# Patient Record
Sex: MALE | Race: WHITE | NOT HISPANIC OR LATINO | Employment: OTHER | URBAN - METROPOLITAN AREA
[De-identification: names, ages, dates, MRNs, and addresses within clinical notes are randomized per-mention and may not be internally consistent; named-entity substitution may affect disease eponyms.]

---

## 2023-07-11 LAB
ALANINE AMINOTRANSFERASE (SGPT) (U/L) IN SER/PLAS: 20 U/L (ref 10–52)
ALBUMIN (G/DL) IN SER/PLAS: 4.3 G/DL (ref 3.4–5)
ALKALINE PHOSPHATASE (U/L) IN SER/PLAS: 65 U/L (ref 33–136)
ANION GAP IN SER/PLAS: 9 MMOL/L (ref 10–20)
ASPARTATE AMINOTRANSFERASE (SGOT) (U/L) IN SER/PLAS: 19 U/L (ref 9–39)
BILIRUBIN TOTAL (MG/DL) IN SER/PLAS: 0.6 MG/DL (ref 0–1.2)
CALCIUM (MG/DL) IN SER/PLAS: 9.8 MG/DL (ref 8.6–10.3)
CARBON DIOXIDE, TOTAL (MMOL/L) IN SER/PLAS: 32 MMOL/L (ref 21–32)
CHLORIDE (MMOL/L) IN SER/PLAS: 105 MMOL/L (ref 98–107)
CHOLESTEROL (MG/DL) IN SER/PLAS: 177 MG/DL (ref 0–199)
CHOLESTEROL IN HDL (MG/DL) IN SER/PLAS: 37.8 MG/DL
CHOLESTEROL/HDL RATIO: 4.7
CREATININE (MG/DL) IN SER/PLAS: 0.93 MG/DL (ref 0.5–1.3)
ERYTHROCYTE DISTRIBUTION WIDTH (RATIO) BY AUTOMATED COUNT: 12 % (ref 11.5–14.5)
ERYTHROCYTE MEAN CORPUSCULAR HEMOGLOBIN CONCENTRATION (G/DL) BY AUTOMATED: 33.6 G/DL (ref 32–36)
ERYTHROCYTE MEAN CORPUSCULAR VOLUME (FL) BY AUTOMATED COUNT: 95 FL (ref 80–100)
ERYTHROCYTES (10*6/UL) IN BLOOD BY AUTOMATED COUNT: 4.83 X10E12/L (ref 4.5–5.9)
GFR MALE: 84 ML/MIN/1.73M2
GLUCOSE (MG/DL) IN SER/PLAS: 105 MG/DL (ref 74–99)
HEMATOCRIT (%) IN BLOOD BY AUTOMATED COUNT: 45.8 % (ref 41–52)
HEMOGLOBIN (G/DL) IN BLOOD: 15.4 G/DL (ref 13.5–17.5)
LDL: 101 MG/DL (ref 0–99)
LEUKOCYTES (10*3/UL) IN BLOOD BY AUTOMATED COUNT: 4.3 X10E9/L (ref 4.4–11.3)
PLATELETS (10*3/UL) IN BLOOD AUTOMATED COUNT: 171 X10E9/L (ref 150–450)
POTASSIUM (MMOL/L) IN SER/PLAS: 3.9 MMOL/L (ref 3.5–5.3)
PROSTATE SPECIFIC AG (NG/ML) IN SER/PLAS: 1.81 NG/ML (ref 0–4)
PROTEIN TOTAL: 7 G/DL (ref 6.4–8.2)
SODIUM (MMOL/L) IN SER/PLAS: 142 MMOL/L (ref 136–145)
THYROTROPIN (MIU/L) IN SER/PLAS BY DETECTION LIMIT <= 0.05 MIU/L: 4.47 MIU/L (ref 0.44–3.98)
THYROXINE (T4) FREE (NG/DL) IN SER/PLAS: 0.74 NG/DL (ref 0.61–1.12)
TRIGLYCERIDE (MG/DL) IN SER/PLAS: 190 MG/DL (ref 0–149)
UREA NITROGEN (MG/DL) IN SER/PLAS: 16 MG/DL (ref 6–23)
VLDL: 38 MG/DL (ref 0–40)

## 2023-09-27 PROBLEM — N40.1 ENLARGED PROSTATE WITH LOWER URINARY TRACT SYMPTOMS (LUTS): Status: ACTIVE | Noted: 2023-09-27

## 2023-09-27 PROBLEM — H35.3290 EXUDATIVE AGE-RELATED MACULAR DEGENERATION (MULTI): Status: ACTIVE | Noted: 2023-09-27

## 2023-09-27 PROBLEM — K57.30 DIVERTICULOSIS OF COLON: Status: ACTIVE | Noted: 2023-09-27

## 2023-09-27 PROBLEM — J30.9 ALLERGIC RHINITIS: Status: ACTIVE | Noted: 2023-09-27

## 2023-09-27 PROBLEM — E55.9 VITAMIN D DEFICIENCY: Status: ACTIVE | Noted: 2023-09-27

## 2023-09-27 PROBLEM — E78.5 HLD (HYPERLIPIDEMIA): Status: ACTIVE | Noted: 2023-09-27

## 2023-09-27 PROBLEM — Z85.89 HISTORY OF SQUAMOUS CELL CARCINOMA: Status: ACTIVE | Noted: 2023-09-27

## 2023-09-27 PROBLEM — D22.9 ATYPICAL NEVUS: Status: ACTIVE | Noted: 2023-09-27

## 2023-09-27 PROBLEM — H25.13 CATARACT, NUCLEAR SCLEROTIC, BOTH EYES: Status: ACTIVE | Noted: 2023-09-27

## 2023-09-27 PROBLEM — H00.019 HORDEOLUM EXTERNUM (STYE): Status: ACTIVE | Noted: 2023-09-27

## 2023-09-27 PROBLEM — L91.8 INFLAMED SKIN TAG: Status: ACTIVE | Noted: 2023-09-27

## 2023-09-27 PROBLEM — M19.90 OSTEOARTHROSIS: Status: ACTIVE | Noted: 2023-09-27

## 2023-09-27 PROBLEM — H35.30 MACULAR DEGENERATION: Status: ACTIVE | Noted: 2023-09-27

## 2023-09-27 PROBLEM — H69.90 EUSTACHIAN TUBE DYSFUNCTION: Status: ACTIVE | Noted: 2023-09-27

## 2023-09-27 PROBLEM — E66.3 OVERWEIGHT (BMI 25.0-29.9): Status: ACTIVE | Noted: 2023-09-27

## 2023-09-27 PROBLEM — H44.23 MYOPIC DEGENERATION, BILATERAL: Status: ACTIVE | Noted: 2023-09-27

## 2023-09-27 PROBLEM — K21.9 GERD (GASTROESOPHAGEAL REFLUX DISEASE): Status: ACTIVE | Noted: 2023-09-27

## 2023-09-27 PROBLEM — H00.026 INTERNAL HORDEOLUM OF LEFT EYE: Status: ACTIVE | Noted: 2023-09-27

## 2023-09-27 PROBLEM — I10 BENIGN HYPERTENSION: Status: ACTIVE | Noted: 2023-09-27

## 2023-09-27 PROBLEM — E80.6 HYPERBILIRUBINEMIA: Status: ACTIVE | Noted: 2023-09-27

## 2023-09-27 RX ORDER — CHOLECALCIFEROL (VITAMIN D3) 50 MCG
1 TABLET ORAL DAILY
COMMUNITY

## 2023-09-27 RX ORDER — PANTOPRAZOLE SODIUM 40 MG/1
1 TABLET, DELAYED RELEASE ORAL DAILY
COMMUNITY
Start: 2022-06-20 | End: 2023-10-03 | Stop reason: ALTCHOICE

## 2023-09-27 RX ORDER — SIMVASTATIN 20 MG/1
1 TABLET, FILM COATED ORAL NIGHTLY
COMMUNITY
Start: 2021-06-03 | End: 2023-10-03

## 2023-09-27 RX ORDER — AMLODIPINE BESYLATE 2.5 MG/1
1 TABLET ORAL DAILY
COMMUNITY
Start: 2021-06-03 | End: 2024-01-08 | Stop reason: SDUPTHER

## 2023-09-27 RX ORDER — BENAZEPRIL HYDROCHLORIDE AND HYDROCHLOROTHIAZIDE 20; 25 MG/1; MG/1
1 TABLET ORAL DAILY
COMMUNITY
Start: 2022-07-23 | End: 2023-12-19

## 2023-10-02 ENCOUNTER — OFFICE VISIT (OUTPATIENT)
Dept: OPHTHALMOLOGY | Facility: CLINIC | Age: 78
End: 2023-10-02
Payer: MEDICARE

## 2023-10-02 DIAGNOSIS — H35.3231 EXUDATIVE AGE-RELATED MACULAR DEGENERATION OF BOTH EYES WITH ACTIVE CHOROIDAL NEOVASCULARIZATION (MULTI): ICD-10-CM

## 2023-10-02 DIAGNOSIS — H44.23 DEGENERATIVE MYOPIA OF BOTH EYES, UNSPECIFIED WHETHER COMPLICATION PRESENT: Primary | ICD-10-CM

## 2023-10-02 PROCEDURE — 1159F MED LIST DOCD IN RCRD: CPT | Performed by: OPHTHALMOLOGY

## 2023-10-02 PROCEDURE — 1126F AMNT PAIN NOTED NONE PRSNT: CPT | Performed by: OPHTHALMOLOGY

## 2023-10-02 PROCEDURE — 3079F DIAST BP 80-89 MM HG: CPT | Performed by: OPHTHALMOLOGY

## 2023-10-02 PROCEDURE — 3075F SYST BP GE 130 - 139MM HG: CPT | Performed by: OPHTHALMOLOGY

## 2023-10-02 PROCEDURE — 1036F TOBACCO NON-USER: CPT | Performed by: OPHTHALMOLOGY

## 2023-10-02 PROCEDURE — 92134 CPTRZ OPH DX IMG PST SGM RTA: CPT | Mod: BILATERAL PROCEDURE | Performed by: OPHTHALMOLOGY

## 2023-10-02 PROCEDURE — 67028 INJECTION EYE DRUG: CPT | Mod: BILATERAL PROCEDURE | Performed by: OPHTHALMOLOGY

## 2023-10-02 ASSESSMENT — VISUAL ACUITY
OS_CC: 20/50-2
OD_CC: 20/25-3
METHOD: SNELLEN - LINEAR

## 2023-10-02 ASSESSMENT — TONOMETRY
OD_IOP_MMHG: 20
OS_IOP_MMHG: 18
IOP_METHOD: TONOPEN

## 2023-10-02 ASSESSMENT — ENCOUNTER SYMPTOMS: EYES NEGATIVE: 1

## 2023-10-02 NOTE — PROGRESS NOTES
GENERAL/CONSTITUTIONAL blurry [place]  INTEGUMENTARY  ENT  RESPIRATORY   [facility]  GI    MUSCULOSKELETAL  NEUROLOGICAL  ENDOCRINE  HEMATOLOGIC/LYMPHATIC  PSYCHIATRIC  ALLERGIES/IMMUNOLOGICAL        Sylvester Carvalho  77 y.o.     IMAGING.  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 Recommend anti-VEGF injections. Eylea done OU today in a sterile manner with Betadine 5% for antisepsis.    Return 6w

## 2023-10-03 DIAGNOSIS — E78.2 MODERATE MIXED HYPERLIPIDEMIA NOT REQUIRING STATIN THERAPY: Primary | ICD-10-CM

## 2023-10-03 RX ORDER — SIMVASTATIN 20 MG/1
20 TABLET, FILM COATED ORAL NIGHTLY
Qty: 90 TABLET | Refills: 0 | Status: SHIPPED | OUTPATIENT
Start: 2023-10-03 | End: 2023-12-06

## 2023-10-04 ENCOUNTER — TELEPHONE (OUTPATIENT)
Dept: PRIMARY CARE | Facility: CLINIC | Age: 78
End: 2023-10-04
Payer: MEDICARE

## 2023-10-10 ENCOUNTER — OFFICE VISIT (OUTPATIENT)
Dept: PRIMARY CARE | Facility: CLINIC | Age: 78
End: 2023-10-10
Payer: MEDICARE

## 2023-10-10 ENCOUNTER — ANCILLARY PROCEDURE (OUTPATIENT)
Dept: RADIOLOGY | Facility: CLINIC | Age: 78
End: 2023-10-10
Payer: MEDICARE

## 2023-10-10 ENCOUNTER — TELEPHONE (OUTPATIENT)
Dept: PRIMARY CARE | Facility: CLINIC | Age: 78
End: 2023-10-10

## 2023-10-10 VITALS
WEIGHT: 178 LBS | TEMPERATURE: 97.6 F | DIASTOLIC BLOOD PRESSURE: 90 MMHG | HEART RATE: 83 BPM | HEIGHT: 66 IN | SYSTOLIC BLOOD PRESSURE: 132 MMHG | OXYGEN SATURATION: 95 % | BODY MASS INDEX: 28.61 KG/M2

## 2023-10-10 DIAGNOSIS — M85.641 CYST OF BONE OF RIGHT HAND: ICD-10-CM

## 2023-10-10 DIAGNOSIS — Z12.11 ENCOUNTER FOR SCREENING FOR MALIGNANT NEOPLASM OF COLON: ICD-10-CM

## 2023-10-10 DIAGNOSIS — E66.3 OVERWEIGHT (BMI 25.0-29.9): ICD-10-CM

## 2023-10-10 DIAGNOSIS — I10 BENIGN HYPERTENSION: ICD-10-CM

## 2023-10-10 DIAGNOSIS — R19.5 PENCILLING OF STOOLS: Primary | ICD-10-CM

## 2023-10-10 DIAGNOSIS — R19.5 PENCILLING OF STOOLS: ICD-10-CM

## 2023-10-10 PROCEDURE — 1159F MED LIST DOCD IN RCRD: CPT | Performed by: FAMILY MEDICINE

## 2023-10-10 PROCEDURE — 74176 CT ABD & PELVIS W/O CONTRAST: CPT | Performed by: STUDENT IN AN ORGANIZED HEALTH CARE EDUCATION/TRAINING PROGRAM

## 2023-10-10 PROCEDURE — 74176 CT ABD & PELVIS W/O CONTRAST: CPT | Mod: MG

## 2023-10-10 PROCEDURE — 1126F AMNT PAIN NOTED NONE PRSNT: CPT | Performed by: FAMILY MEDICINE

## 2023-10-10 PROCEDURE — 99214 OFFICE O/P EST MOD 30 MIN: CPT | Performed by: FAMILY MEDICINE

## 2023-10-10 PROCEDURE — 3080F DIAST BP >= 90 MM HG: CPT | Performed by: FAMILY MEDICINE

## 2023-10-10 PROCEDURE — 3075F SYST BP GE 130 - 139MM HG: CPT | Performed by: FAMILY MEDICINE

## 2023-10-10 RX ORDER — PANTOPRAZOLE SODIUM 40 MG/1
40 TABLET, DELAYED RELEASE ORAL
COMMUNITY
End: 2024-01-08 | Stop reason: SDUPTHER

## 2023-10-10 ASSESSMENT — PAIN SCALES - GENERAL: PAINLEVEL: 0-NO PAIN

## 2023-10-10 ASSESSMENT — PATIENT HEALTH QUESTIONNAIRE - PHQ9
1. LITTLE INTEREST OR PLEASURE IN DOING THINGS: NOT AT ALL
SUM OF ALL RESPONSES TO PHQ9 QUESTIONS 1 AND 2: 0
2. FEELING DOWN, DEPRESSED OR HOPELESS: NOT AT ALL

## 2023-10-10 NOTE — TELEPHONE ENCOUNTER
Pt was just seen today and said he saw the CT results, he wanted to clarify that he can go ahead and schedule the colonoscopy now correct, or if there is anything else he needs to do

## 2023-10-10 NOTE — PROGRESS NOTES
Saturday  - very thin fecal matter   Sunday - Took laxatives, cleaned out   Today - pencil thin fecal matter again   No change in anything else     Pt unsure if he should be taking the pantoprazole 40 or 20     Right hand pinky finger bump

## 2023-10-10 NOTE — PROGRESS NOTES
Patient is here concerned with pencil thin stool.  He noticed this on Saturday if he has a regular bowel movement after he eats breakfast every morning.  He thought this was usual and had had [employer] fish and chips and felt a little constipated.  His wife just had a colonoscopy and so he took 2 of her Dulcolax and got really cleaned out Sunday and into Monday.  His last colonoscopy was 7 years ago.  He then had another pencil thin stool without pain, bleeding, nausea vomiting or diarrhea.  He was concerned about this.  He also noticed he had a little cyst come up on the distal fifth digit of the right hand dorsally.    REVIEW OF SYSTEMS: 12 systems negative except for those mentioned in the HPI.    PHYSICAL EXAMINATION:   Constitutional: The patient is alert, in no acute  distress.  Eyes: Extraocular movements are intact.   ENT: external ear canals patent  Neck: no  JVD.  Heart: no JVD  Lungs: Normal respiration without stridor or nasal flaring   Psychiatric: Good judgment and insight. Normal affect and mood.  Skin: There is a fluctuant noninfected cyst of the DIP joint of the fifth digit of the right hand      Assessment:  per EMR    Plan:  Definitely concerned with the patient's symptoms for an obstructive mass in the abdomen.  Is been 7 years since he had a colonoscopy we will schedule him for this full dose that abdomen pelvis with contrast to rule out obstructing mass especially since the patient had clean himself out to rule out regular constipation and still had pencil stool after this.  We can address the possible ganglion cyst with hand surgery after we have this and possible colorectal referral  This dictation was created using Dragon dictation and may contain errors

## 2023-10-12 NOTE — PROGRESS NOTES
Sylvester Carvalho is a 77 y.o. male with past medical history of HTN, hyperlipidemia, and GERD who is referred by Dr. Ashley for a pre-colonoscopy screening exam. There is no known family history of colon polyps or colorectal cancer. His first and only colonoscopy was in 2016 with diverticulosis in sigmoid and descending colon, otherwise normal. He states he has always been regular with one formed bowel movement in the morning after breakfast. About 2 weeks ago he developed pencil thin stools. This occurred for several days. He tried taking 2 Dulcolax which gave him somewhat of a clean out, but the next day stools went back to being pencil thin. There was no rectal bleeding, abdominal pain, or weight loss. He called his PCP who ordered CT scan. No bowel wall thickening, dilation or obstructing mass. Normal appendix. Colonic diverticulosis without acute inflammation. Today, he tells me stools have now returned to normal, going once a day in AM. No longer appear thin.     Past Medical History:   Diagnosis Date    Exudative age-related macular degeneration, left eye, stage unspecified (CMS/[facility]) 01/31/2018    Age-related macular degeneration, wet, left eye    Personal history of other endocrine, nutritional and metabolic disease 01/24/2018    History of high cholesterol     Past Surgical History:   Procedure Laterality Date    TOTAL HIP ARTHROPLASTY  01/24/2018    Hip Replacement     Current Outpatient Medications   Medication Sig Dispense Refill    amLODIPine (Norvasc) 2.5 mg tablet Take 1 tablet (2.5 mg) by mouth once daily.      benazepriL-hydrochlorothiazide (Lotensin HCT) 20-25 mg tablet Take 1 tablet by mouth once daily.      cholecalciferol (Vitamin D-3) 50 MCG (2000 UT) tablet Take 1 tablet (2,000 Units) by mouth once daily.      pantoprazole (ProtoNix) 40 mg EC tablet Take 1 tablet (40 mg) by mouth once daily in the morning. Take before meals. Do not crush, chew, or split.      simvastatin (Zocor) 20 mg tablet TAKE  "1 TABLET BY MOUTH AT  BEDTIME 90 tablet 0     No current facility-administered medications for this visit.     Allergies   Allergen Reactions    Penicillins Unknown       Family History   Problem Relation Name Age of Onset    Other (stroke syndrome) Mother      Lung cancer Father      No Known Problems Other         Review of Systems  Review of Systems negative except as noted in HPI.    Objective     /81   Pulse 80   Temp 36.6 °C (97.9 °F)   Ht 1.676 m (5' 6\")   Wt 81.2 kg (179 lb)   BMI 28.89 kg/m²      Physical Exam  Constitutional:  No acute distress. Normal appearance. Not ill-appearing.  HENT:  Head normocephalic and atraumatic. Conjunctivae normal.  Cardiovascular:  Normal rate. Regular rhythm.  Pulmonary:  Pulmonary effort normal. No respiratory distress. Breath sounds clear.  Abdominal:  Abdomen is soft. There is no distension. No tenderness or guarding.  Skin: Dry.  Neurological:  Alert and oriented.  Psychiatric:  Mood and affect normal.    Assessment/Plan     Sylvester Carvalho is a 77 y.o. male with history of HTN, hyperlipidemia, and GERD who presents today for 1-2 week history of change in bowel habits to include pencil-thin stools. He had a CT scan without bowel wall thickening or obstructive mass. He is average risk for colon cancer. His first and only colonoscopy was 2016 with diverticulosis otherwise normal. He has no significant family history.     We discussed Cologuard versus colonoscopy including risks and benefits of procedure. He wishes to proceed with colonoscopy.      The patient is to return prn for evaluation.     Electronically signed by: Keren Terrell CNP on 10/20/2023 at 10:06 AM   "

## 2023-10-13 ENCOUNTER — APPOINTMENT (OUTPATIENT)
Dept: GASTROENTEROLOGY | Facility: CLINIC | Age: 78
End: 2023-10-13
Payer: MEDICARE

## 2023-10-20 ENCOUNTER — OFFICE VISIT (OUTPATIENT)
Dept: GASTROENTEROLOGY | Facility: CLINIC | Age: 78
End: 2023-10-20
Payer: MEDICARE

## 2023-10-20 VITALS
DIASTOLIC BLOOD PRESSURE: 81 MMHG | WEIGHT: 179 LBS | HEIGHT: 66 IN | HEART RATE: 80 BPM | SYSTOLIC BLOOD PRESSURE: 137 MMHG | BODY MASS INDEX: 28.77 KG/M2 | TEMPERATURE: 97.9 F

## 2023-10-20 DIAGNOSIS — R19.4 CHANGE IN BOWEL HABITS: ICD-10-CM

## 2023-10-20 DIAGNOSIS — R19.5 PENCILLING OF STOOLS: Primary | ICD-10-CM

## 2023-10-20 PROCEDURE — 1126F AMNT PAIN NOTED NONE PRSNT: CPT | Performed by: NURSE PRACTITIONER

## 2023-10-20 PROCEDURE — 99204 OFFICE O/P NEW MOD 45 MIN: CPT | Performed by: NURSE PRACTITIONER

## 2023-10-20 PROCEDURE — 99214 OFFICE O/P EST MOD 30 MIN: CPT | Mod: PO | Performed by: NURSE PRACTITIONER

## 2023-10-20 PROCEDURE — 3079F DIAST BP 80-89 MM HG: CPT | Performed by: NURSE PRACTITIONER

## 2023-10-20 PROCEDURE — 1159F MED LIST DOCD IN RCRD: CPT | Performed by: NURSE PRACTITIONER

## 2023-10-20 PROCEDURE — 1036F TOBACCO NON-USER: CPT | Performed by: NURSE PRACTITIONER

## 2023-10-20 PROCEDURE — 3075F SYST BP GE 130 - 139MM HG: CPT | Performed by: NURSE PRACTITIONER

## 2023-10-20 ASSESSMENT — ENCOUNTER SYMPTOMS: DEPRESSION: 0

## 2023-10-25 ENCOUNTER — ANESTHESIA EVENT (OUTPATIENT)
Dept: GASTROENTEROLOGY | Facility: EXTERNAL LOCATION | Age: 78
End: 2023-10-25

## 2023-11-01 ENCOUNTER — PREP FOR PROCEDURE (OUTPATIENT)
Dept: GASTROENTEROLOGY | Facility: EXTERNAL LOCATION | Age: 78
End: 2023-11-01
Payer: MEDICARE

## 2023-11-03 ENCOUNTER — HOSPITAL ENCOUNTER (OUTPATIENT)
Dept: GASTROENTEROLOGY | Facility: EXTERNAL LOCATION | Age: 78
Discharge: HOME | End: 2023-11-03
Payer: MEDICARE

## 2023-11-03 ENCOUNTER — ANESTHESIA (OUTPATIENT)
Dept: GASTROENTEROLOGY | Facility: EXTERNAL LOCATION | Age: 78
End: 2023-11-03

## 2023-11-03 VITALS
WEIGHT: 170 LBS | HEART RATE: 74 BPM | OXYGEN SATURATION: 98 % | TEMPERATURE: 96.8 F | SYSTOLIC BLOOD PRESSURE: 123 MMHG | HEIGHT: 66 IN | RESPIRATION RATE: 16 BRPM | BODY MASS INDEX: 27.32 KG/M2 | DIASTOLIC BLOOD PRESSURE: 80 MMHG

## 2023-11-03 DIAGNOSIS — Z12.11 ENCOUNTER FOR SCREENING FOR MALIGNANT NEOPLASM OF COLON: ICD-10-CM

## 2023-11-03 PROCEDURE — 88305 TISSUE EXAM BY PATHOLOGIST: CPT | Mod: TC | Performed by: INTERNAL MEDICINE

## 2023-11-03 PROCEDURE — 45380 COLONOSCOPY AND BIOPSY: CPT | Performed by: INTERNAL MEDICINE

## 2023-11-03 PROCEDURE — 88305 TISSUE EXAM BY PATHOLOGIST: CPT | Performed by: PATHOLOGY

## 2023-11-03 PROCEDURE — 88305 TISSUE EXAM BY PATHOLOGIST: CPT | Mod: TC,SUR | Performed by: INTERNAL MEDICINE

## 2023-11-03 PROCEDURE — 45385 COLONOSCOPY W/LESION REMOVAL: CPT | Performed by: INTERNAL MEDICINE

## 2023-11-03 RX ORDER — ONDANSETRON HYDROCHLORIDE 2 MG/ML
4 INJECTION, SOLUTION INTRAVENOUS ONCE AS NEEDED
Status: DISCONTINUED | OUTPATIENT
Start: 2023-11-03 | End: 2023-11-04 | Stop reason: HOSPADM

## 2023-11-03 RX ORDER — PROPOFOL 10 MG/ML
INJECTION, EMULSION INTRAVENOUS AS NEEDED
Status: DISCONTINUED | OUTPATIENT
Start: 2023-11-03 | End: 2023-11-03

## 2023-11-03 RX ORDER — SODIUM CHLORIDE 9 MG/ML
20 INJECTION, SOLUTION INTRAVENOUS CONTINUOUS
Status: DISCONTINUED | OUTPATIENT
Start: 2023-11-03 | End: 2023-11-04 | Stop reason: HOSPADM

## 2023-11-03 RX ADMIN — PROPOFOL 50 MG: 10 INJECTION, EMULSION INTRAVENOUS at 12:44

## 2023-11-03 RX ADMIN — PROPOFOL 50 MG: 10 INJECTION, EMULSION INTRAVENOUS at 12:54

## 2023-11-03 RX ADMIN — PROPOFOL 50 MG: 10 INJECTION, EMULSION INTRAVENOUS at 12:52

## 2023-11-03 RX ADMIN — PROPOFOL 50 MG: 10 INJECTION, EMULSION INTRAVENOUS at 12:49

## 2023-11-03 RX ADMIN — SODIUM CHLORIDE: 9 INJECTION, SOLUTION INTRAVENOUS at 12:37

## 2023-11-03 RX ADMIN — PROPOFOL 100 MG: 10 INJECTION, EMULSION INTRAVENOUS at 12:40

## 2023-11-03 SDOH — HEALTH STABILITY: MENTAL HEALTH: CURRENT SMOKER: 0

## 2023-11-03 ASSESSMENT — COLUMBIA-SUICIDE SEVERITY RATING SCALE - C-SSRS
1. IN THE PAST MONTH, HAVE YOU WISHED YOU WERE DEAD OR WISHED YOU COULD GO TO SLEEP AND NOT WAKE UP?: NO
2. HAVE YOU ACTUALLY HAD ANY THOUGHTS OF KILLING YOURSELF?: NO
6. HAVE YOU EVER DONE ANYTHING, STARTED TO DO ANYTHING, OR PREPARED TO DO ANYTHING TO END YOUR LIFE?: NO

## 2023-11-03 ASSESSMENT — PAIN - FUNCTIONAL ASSESSMENT
PAIN_FUNCTIONAL_ASSESSMENT: 0-10

## 2023-11-03 ASSESSMENT — PAIN SCALES - GENERAL
PAIN_LEVEL: 0
PAINLEVEL_OUTOF10: 0 - NO PAIN

## 2023-11-03 NOTE — H&P
Outpatient Hospital Procedure H&P    Patient Profile-Procedures  Initial Info  Patient Demographics  Name Sylvester Carvalho  Date of Birth 1945  MRN [id number]  Address   [address] [address]  [place] [id number] [address]  [place] [address]    Primary Phone Number [phone]  Secondary Phone Number    PCP Cedric Ashley    Procedure(s):  Colonoscopy    Primary contact name and number   Extended Emergency Contact Information  Primary Emergency Contact: Jenniffer Carvalho   [facility]  Home Phone: [phone]  Mobile Phone: [phone]  Relation: Spouse    General Health  Weight   Vitals:    11/03/23 1157   Weight: 77.1 kg (170 lb)     BMI Body mass index is 27.44 kg/m².    Allergies  Allergies   Allergen Reactions    Penicillins Unknown       Past Medical History   Past Medical History:   Diagnosis Date    Exudative age-related macular degeneration, left eye, stage unspecified (CMS/[facility]) 01/31/2018    Age-related macular degeneration, wet, left eye    GERD (gastroesophageal reflux disease)     Hyperlipidemia     Hypertension     Personal history of other endocrine, nutritional and metabolic disease 01/24/2018    History of high cholesterol       Provider assessment  Diagnosis: Change in stool caliber      Medication Reviewed - yes  Prior to Admission medications    Medication Sig Start Date End Date Taking? Authorizing Provider   amLODIPine (Norvasc) 2.5 mg tablet Take 1 tablet (2.5 mg) by mouth once daily. 6/3/21  Yes Historical Provider, MD   benazepriL-hydrochlorothiazide (Lotensin HCT) 20-25 mg tablet Take 1 tablet by mouth once daily. 7/23/22  Yes Historical Provider, MD   cholecalciferol (Vitamin D-3) 50 MCG (2000 UT) tablet Take 1 tablet (2,000 Units) by mouth once daily.   Yes Historical Provider, MD   pantoprazole (ProtoNix) 40 mg EC tablet Take 1 tablet (40 mg) by mouth once daily in the morning. Take before meals. Do not crush, chew, or split.   Yes Historical Provider, MD    simvastatin (Zocor) 20 mg tablet TAKE 1 TABLET BY MOUTH AT  BEDTIME 10/3/23  Yes Cedric YUNG Ashley, DO       Physical Exam  Vitals:    11/03/23 1157   BP: 113/71   Pulse: 76   Resp: 12   Temp: 36 °C (96.8 °F)   SpO2: 96%        General: A&Ox3, NAD.  HEENT: AT/NC.   CV: RRR. No murmur.  Resp: CTA bilaterally. No wheezing, rhonchi or rales.   GI: Soft, NT/ND. BSx4.  Extrem: No edema. Pulses intact.  Skin: No Jaundice.   Neuro: No focal deficits.   Psych: Normal mood and affect.        Oropharyngeal Classification II (hard and soft palate, upper portion of tonsils anduvula visible)  ASA PS Classification 2  Sedation Plan Deep  Procedure Plan - pre-procedural (re)assesment completed by physician:  discharge/transfer patient when discharge criteria met    Darian Booth MD  11/3/2023 12:21 PM

## 2023-11-03 NOTE — ANESTHESIA POSTPROCEDURE EVALUATION
Patient: Sylvester Carvalho    Procedure Summary       Date: 11/03/23 Room / Location: [facility]    Anesthesia Start: 1237 Anesthesia Stop: 1311    Procedure: COLONOSCOPY Diagnosis: Encounter for screening for malignant neoplasm of colon    Scheduled Providers: Darian Booth MD Responsible Provider: Arnoldo Fox    Anesthesia Type: MAC ASA Status: 2            Anesthesia Type: MAC    Vitals Value Taken Time   /67 11/03/23 1311   Temp 36.3 11/03/23 1311   Pulse 71 11/03/23 1311   Resp 13 11/03/23 1311   SpO2 95 11/03/23 1311       Anesthesia Post Evaluation    Patient location during evaluation: PACU  Patient participation: complete - patient participated  Level of consciousness: sleepy but conscious  Pain score: 0  Pain management: adequate  Airway patency: patent  Cardiovascular status: acceptable  Respiratory status: acceptable  Hydration status: acceptable        No notable events documented.

## 2023-11-03 NOTE — ANESTHESIA PREPROCEDURE EVALUATION
Patient: Sylvester Carvalho    Procedure Information       Date/Time: 11/03/23 1230    Scheduled providers: Darian Booth MD    Procedure: COLONOSCOPY    Location: [facility]            Relevant Problems   Cardiovascular   (+) Benign hypertension   (+) HLD (hyperlipidemia)      GI   (+) GERD (gastroesophageal reflux disease)      Musculoskeletal   (+) Osteoarthrosis       Clinical information reviewed:   Tobacco  Allergies  Meds   Med Hx  Surg Hx   Fam Hx  Soc Hx      Vitals:    11/03/23 1157   BP: 113/71   Pulse: 76   Resp: 12   Temp: 36 °C (96.8 °F)   SpO2: 96%       Past Surgical History:   Procedure Laterality Date    TOTAL HIP ARTHROPLASTY  01/24/2018    Hip Replacement     Past Medical History:   Diagnosis Date    Exudative age-related macular degeneration, left eye, stage unspecified (CMS/HCC) 01/31/2018    Age-related macular degeneration, wet, left eye    GERD (gastroesophageal reflux disease)     Hyperlipidemia     Hypertension     Personal history of other endocrine, nutritional and metabolic disease 01/24/2018    History of high cholesterol       Current Outpatient Medications:     amLODIPine (Norvasc) 2.5 mg tablet, Take 1 tablet (2.5 mg) by mouth once daily., Disp: , Rfl:     benazepriL-hydrochlorothiazide (Lotensin HCT) 20-25 mg tablet, Take 1 tablet by mouth once daily., Disp: , Rfl:     cholecalciferol (Vitamin D-3) 50 MCG (2000 UT) tablet, Take 1 tablet (2,000 Units) by mouth once daily., Disp: , Rfl:     pantoprazole (ProtoNix) 40 mg EC tablet, Take 1 tablet (40 mg) by mouth once daily in the morning. Take before meals. Do not crush, chew, or split., Disp: , Rfl:     simvastatin (Zocor) 20 mg tablet, TAKE 1 TABLET BY MOUTH AT  BEDTIME, Disp: 90 tablet, Rfl: 0    Current Facility-Administered Medications:     ondansetron (Zofran) injection 4 mg, 4 mg, intravenous, Once PRN, Darian Booth MD    sodium chloride 0.9% infusion, 20 mL/hr, intravenous, Continuous, Darian Booth,  "MD  Prior to Admission medications    Medication Sig Start Date End Date Taking? Authorizing Provider   amLODIPine (Norvasc) 2.5 mg tablet Take 1 tablet (2.5 mg) by mouth once daily. 6/3/21  Yes Historical Provider, MD   benazepriL-hydrochlorothiazide (Lotensin HCT) 20-25 mg tablet Take 1 tablet by mouth once daily. 7/23/22  Yes Historical Provider, MD   cholecalciferol (Vitamin D-3) 50 MCG (2000 UT) tablet Take 1 tablet (2,000 Units) by mouth once daily.   Yes Historical Provider, MD   pantoprazole (ProtoNix) 40 mg EC tablet Take 1 tablet (40 mg) by mouth once daily in the morning. Take before meals. Do not crush, chew, or split.   Yes Historical Provider, MD   simvastatin (Zocor) 20 mg tablet TAKE 1 TABLET BY MOUTH AT  BEDTIME 10/3/23  Yes Cedric Ashley, DO     Allergies   Allergen Reactions    Penicillins Unknown     Social History     Tobacco Use    Smoking status: Former     Types: Cigarettes    Smokeless tobacco: Never   Substance Use Topics    Alcohol use: Yes     Alcohol/week: 2.0 standard drinks of alcohol     Types: 2 Standard drinks or equivalent per week     Comment: occassionally         Chemistry    Lab Results   Component Value Date/Time     07/11/2023 0918    K 3.9 07/11/2023 0918     07/11/2023 0918    CO2 32 07/11/2023 0918    BUN 16 07/11/2023 0918    CREATININE 0.93 07/11/2023 0918    Lab Results   Component Value Date/Time    CALCIUM 9.8 07/11/2023 0918    ALKPHOS 65 07/11/2023 0918    AST 19 07/11/2023 0918    ALT 20 07/11/2023 0918    BILITOT 0.6 07/11/2023 0918          Lab Results   Component Value Date/Time    WBC 4.3 (L) 07/11/2023 0918    HGB 15.4 07/11/2023 0918    HCT 45.8 07/11/2023 0918     07/11/2023 0918     No results found for: \"PROTIME\", \"PTT\", \"INR\"  No results found for this or any previous visit (from the past 4464 hour(s)).  No results found for this or any previous visit from the past 1095 days.    NPO Detail:  NPO/Void Status  Carbonhydrate Drink Given " Prior to Surgery? : N  Date of Last Liquid: 11/03/23  Time of Last Liquid: 0830  Date of Last Solid: 11/02/23  Time of Last Solid: 0800  Last Intake Type: Clear fluids  Time of Last Void: 1159         Physical Exam    Airway  Mallampati: II  TM distance: >3 FB  Neck ROM: full     Cardiovascular   Rhythm: regular  Rate: normal     Dental    Pulmonary   Breath sounds clear to auscultation     Abdominal   Abdomen: soft  Bowel sounds: normal           Anesthesia Plan    ASA 2     MAC     The patient is not a current smoker.  Patient was not previously instructed to abstain from smoking on day of procedure.  Education provided regarding risk of obstructive sleep apnea.  intravenous induction   Anesthetic plan and risks discussed with patient.    Plan discussed with CRNA.

## 2023-11-03 NOTE — DISCHARGE INSTRUCTIONS
The anesthetics, sedatives and pain killers which were given to you will be acting in your body for the next 24 hours. This may cause you to feel sleepy. This feeling will slowly wear off. For the next 24 hours you SHOULD NOT:    Drive a car  Operate machinery or power tools.  Drink any form of alcohol, including beer or wine.  Make any important decisions or sign and legal documents.    You may eat anything as long as your physician has not warned you to stay away from certain foods. However, it is better to start with liquids,  then progress to softer foods, and gradually work up to solid foods.    We strongly suggest that a responsible adult be with you for the rest of the day and also the night. This is for your protection and safety since you may not be as alert as usual. You should be especially careful climbing stairs.     If you experience bleeding, fever, shortness of breath, chest pain, or extreme abdominal pain go to the nearest Emergency Room.    [facility] Phone Number [phone]

## 2023-11-06 ENCOUNTER — APPOINTMENT (OUTPATIENT)
Dept: GASTROENTEROLOGY | Facility: EXTERNAL LOCATION | Age: 78
End: 2023-11-06
Payer: MEDICARE

## 2023-11-08 ENCOUNTER — OFFICE VISIT (OUTPATIENT)
Dept: OPHTHALMOLOGY | Facility: CLINIC | Age: 78
End: 2023-11-08
Payer: MEDICARE

## 2023-11-08 DIAGNOSIS — H25.13 CATARACT, NUCLEAR SCLEROTIC, BOTH EYES: ICD-10-CM

## 2023-11-08 DIAGNOSIS — H35.3231 EXUDATIVE AGE-RELATED MACULAR DEGENERATION OF BOTH EYES WITH ACTIVE CHOROIDAL NEOVASCULARIZATION (MULTI): ICD-10-CM

## 2023-11-08 DIAGNOSIS — H35.30 SENILE MACULAR RETINAL DEGENERATION: ICD-10-CM

## 2023-11-08 DIAGNOSIS — H35.3210 EXUDATIVE AGE-RELATED MACULAR DEGENERATION OF RIGHT EYE, UNSPECIFIED STAGE (MULTI): Primary | ICD-10-CM

## 2023-11-08 PROCEDURE — 92134 CPTRZ OPH DX IMG PST SGM RTA: CPT | Mod: BILATERAL PROCEDURE | Performed by: OPHTHALMOLOGY

## 2023-11-08 PROCEDURE — 67028 INJECTION EYE DRUG: CPT | Mod: BILATERAL PROCEDURE | Performed by: OPHTHALMOLOGY

## 2023-11-08 ASSESSMENT — VISUAL ACUITY
OS_CC: 20/50
OD_CC: 20/25-2
METHOD: SNELLEN - LINEAR
CORRECTION_TYPE: GLASSES

## 2023-11-08 ASSESSMENT — EXTERNAL EXAM - RIGHT EYE: OD_EXAM: NORMAL

## 2023-11-08 ASSESSMENT — CONF VISUAL FIELD
OD_NORMAL: 1
OS_NORMAL: 1
OD_SUPERIOR_NASAL_RESTRICTION: 0
OS_SUPERIOR_NASAL_RESTRICTION: 0
OD_INFERIOR_NASAL_RESTRICTION: 0
OD_SUPERIOR_TEMPORAL_RESTRICTION: 0
OS_SUPERIOR_TEMPORAL_RESTRICTION: 0
OS_INFERIOR_NASAL_RESTRICTION: 0
OD_INFERIOR_TEMPORAL_RESTRICTION: 0
OS_INFERIOR_TEMPORAL_RESTRICTION: 0

## 2023-11-08 ASSESSMENT — SLIT LAMP EXAM - LIDS
COMMENTS: NORMAL
COMMENTS: NORMAL

## 2023-11-08 ASSESSMENT — TONOMETRY
IOP_METHOD: GOLDMANN APPLANATION
OD_IOP_MMHG: 23-24
OS_IOP_MMHG: 22

## 2023-11-08 ASSESSMENT — ENCOUNTER SYMPTOMS: EYES NEGATIVE: 1

## 2023-11-08 ASSESSMENT — CUP TO DISC RATIO
OD_RATIO: 0.4
OS_RATIO: 0.4

## 2023-11-08 ASSESSMENT — EXTERNAL EXAM - LEFT EYE: OS_EXAM: NORMAL

## 2023-11-08 NOTE — PROGRESS NOTES
Assessment/Plan   Diagnoses and all orders for this visit:  Exudative age-related macular degeneration of right eye, unspecified stage (CMS/HCC)  -     OCT, Retina - OU - Both Eyes  Senile macular retinal degeneration  -     OCT, Retina - OU - Both Eyes  Exudative age-related macular degeneration of both eyes with active choroidal neovascularization (CMS/HCC)  Cataract, nuclear sclerotic, both eyes        Active exudative age-related macular degeneration (AMD)  OU      Consider Eyela both eyes (OU)  Treatment options for CNV OU  discussed, including observation, anti-VEGF injections (including Avastin, Lucentis,   Eylea  Vabysmo and  Beovu ), and  laser. Recommend anti-VEGF injections. Eylea done OU today in a sterile manner with Betadine 5% for antisepsis.      FU 1m

## 2023-11-13 ENCOUNTER — APPOINTMENT (OUTPATIENT)
Dept: OPHTHALMOLOGY | Facility: CLINIC | Age: 78
End: 2023-11-13
Payer: MEDICARE

## 2023-11-20 LAB
LABORATORY COMMENT REPORT: NORMAL
PATH REPORT.FINAL DX SPEC: NORMAL
PATH REPORT.GROSS SPEC: NORMAL
PATH REPORT.MICROSCOPIC SPEC OTHER STN: NORMAL
PATH REPORT.RELEVANT HX SPEC: NORMAL
PATH REPORT.TOTAL CANCER: NORMAL

## 2023-12-13 ENCOUNTER — OFFICE VISIT (OUTPATIENT)
Dept: OPHTHALMOLOGY | Facility: CLINIC | Age: 78
End: 2023-12-13
Payer: MEDICARE

## 2023-12-13 DIAGNOSIS — H35.3231 EXUDATIVE AGE-RELATED MACULAR DEGENERATION OF BOTH EYES WITH ACTIVE CHOROIDAL NEOVASCULARIZATION (MULTI): Primary | ICD-10-CM

## 2023-12-13 PROCEDURE — 92134 CPTRZ OPH DX IMG PST SGM RTA: CPT | Mod: BILATERAL PROCEDURE | Performed by: OPHTHALMOLOGY

## 2023-12-13 PROCEDURE — 67028 INJECTION EYE DRUG: CPT | Mod: BILATERAL PROCEDURE | Performed by: OPHTHALMOLOGY

## 2023-12-13 ASSESSMENT — VISUAL ACUITY
OD_CC: 20/20
METHOD: SNELLEN - LINEAR
OS_CC: 20/40

## 2023-12-13 ASSESSMENT — TONOMETRY
OS_IOP_MMHG: 17
OD_IOP_MMHG: 22
IOP_METHOD: GOLDMANN APPLANATION

## 2023-12-13 ASSESSMENT — CONF VISUAL FIELD
OS_SUPERIOR_TEMPORAL_RESTRICTION: 0
OS_INFERIOR_NASAL_RESTRICTION: 0
OD_SUPERIOR_TEMPORAL_RESTRICTION: 0
OD_INFERIOR_TEMPORAL_RESTRICTION: 0
OD_SUPERIOR_NASAL_RESTRICTION: 0
OS_SUPERIOR_NASAL_RESTRICTION: 0
OD_INFERIOR_NASAL_RESTRICTION: 0
OD_NORMAL: 1
OS_INFERIOR_TEMPORAL_RESTRICTION: 0
OS_NORMAL: 1

## 2023-12-13 ASSESSMENT — ENCOUNTER SYMPTOMS: EYES NEGATIVE: 1

## 2023-12-14 PROBLEM — H35.053: Chronic | Status: ACTIVE | Noted: 2023-12-14

## 2023-12-14 PROBLEM — H44.23: Chronic | Status: ACTIVE | Noted: 2023-12-14

## 2023-12-14 ASSESSMENT — EXTERNAL EXAM - LEFT EYE: OS_EXAM: NORMAL

## 2023-12-14 ASSESSMENT — EXTERNAL EXAM - RIGHT EYE: OD_EXAM: NORMAL

## 2023-12-14 ASSESSMENT — SLIT LAMP EXAM - LIDS
COMMENTS: NORMAL
COMMENTS: NORMAL

## 2023-12-14 ASSESSMENT — CUP TO DISC RATIO
OS_RATIO: 0.5
OD_RATIO: 0.5

## 2023-12-14 NOTE — PROGRESS NOTES
Assessment/Plan   Diagnoses and all orders for this visit:  Exudative age-related macular degeneration of both eyes with active choroidal neovascularization (CMS/HCC)  -     OCT, Retina - OU - Both Eyes  -     Intravitreal Injection, Pharmacologic Agent - OU - Both Eyes  -     aflibercept (Eylea) intravitreal injection 2 mg  -     aflibercept (Eylea) intravitreal injection 2 mg        Active exudative age-related macular degeneration (AMD)  OU    Myopic CNV both eyes     Consider Eyela both eyes (OU)  Treatment options for CNV OU  discussed, including observation, anti-VEGF injections (including Avastin, Lucentis,   Eylea  Vabysmo and  Beovu ), and  laser. Recommend anti-VEGF injections. Eylea done OU today in a sterile manner with Betadine 5% for antisepsis.      FU 1m

## 2024-01-08 ENCOUNTER — OFFICE VISIT (OUTPATIENT)
Dept: PRIMARY CARE | Facility: CLINIC | Age: 79
End: 2024-01-08
Payer: MEDICARE

## 2024-01-08 VITALS
HEART RATE: 80 BPM | BODY MASS INDEX: 29.73 KG/M2 | TEMPERATURE: 97.7 F | SYSTOLIC BLOOD PRESSURE: 134 MMHG | OXYGEN SATURATION: 96 % | DIASTOLIC BLOOD PRESSURE: 80 MMHG | WEIGHT: 185 LBS | HEIGHT: 66 IN

## 2024-01-08 DIAGNOSIS — H35.3130 BILATERAL NONEXUDATIVE AGE-RELATED MACULAR DEGENERATION, UNSPECIFIED STAGE: ICD-10-CM

## 2024-01-08 DIAGNOSIS — E78.2 MODERATE MIXED HYPERLIPIDEMIA NOT REQUIRING STATIN THERAPY: ICD-10-CM

## 2024-01-08 DIAGNOSIS — J30.1 SEASONAL ALLERGIC RHINITIS DUE TO POLLEN: ICD-10-CM

## 2024-01-08 DIAGNOSIS — E55.9 VITAMIN D DEFICIENCY: ICD-10-CM

## 2024-01-08 DIAGNOSIS — Z00.00 WELLNESS EXAMINATION: ICD-10-CM

## 2024-01-08 DIAGNOSIS — E78.2 MIXED HYPERLIPIDEMIA: ICD-10-CM

## 2024-01-08 DIAGNOSIS — E66.3 OVERWEIGHT (BMI 25.0-29.9): ICD-10-CM

## 2024-01-08 DIAGNOSIS — I10 BENIGN HYPERTENSION: Primary | ICD-10-CM

## 2024-01-08 DIAGNOSIS — K21.9 GASTROESOPHAGEAL REFLUX DISEASE WITHOUT ESOPHAGITIS: ICD-10-CM

## 2024-01-08 PROCEDURE — 3079F DIAST BP 80-89 MM HG: CPT | Performed by: FAMILY MEDICINE

## 2024-01-08 PROCEDURE — 1126F AMNT PAIN NOTED NONE PRSNT: CPT | Performed by: FAMILY MEDICINE

## 2024-01-08 PROCEDURE — 3075F SYST BP GE 130 - 139MM HG: CPT | Performed by: FAMILY MEDICINE

## 2024-01-08 PROCEDURE — 1036F TOBACCO NON-USER: CPT | Performed by: FAMILY MEDICINE

## 2024-01-08 PROCEDURE — 1159F MED LIST DOCD IN RCRD: CPT | Performed by: FAMILY MEDICINE

## 2024-01-08 PROCEDURE — 99214 OFFICE O/P EST MOD 30 MIN: CPT | Performed by: FAMILY MEDICINE

## 2024-01-08 RX ORDER — PANTOPRAZOLE SODIUM 40 MG/1
40 TABLET, DELAYED RELEASE ORAL
Qty: 90 TABLET | Refills: 1 | Status: SHIPPED | OUTPATIENT
Start: 2024-01-08 | End: 2024-05-16

## 2024-01-08 RX ORDER — BENAZEPRIL HYDROCHLORIDE AND HYDROCHLOROTHIAZIDE 20; 25 MG/1; MG/1
1 TABLET ORAL DAILY
Qty: 90 TABLET | Refills: 1 | Status: SHIPPED | OUTPATIENT
Start: 2024-01-08 | End: 2024-02-28 | Stop reason: HOSPADM

## 2024-01-08 RX ORDER — AMLODIPINE BESYLATE 2.5 MG/1
2.5 TABLET ORAL DAILY
Qty: 90 TABLET | Refills: 1 | Status: SHIPPED | OUTPATIENT
Start: 2024-01-08 | End: 2024-02-28 | Stop reason: HOSPADM

## 2024-01-08 RX ORDER — SIMVASTATIN 20 MG/1
20 TABLET, FILM COATED ORAL NIGHTLY
Qty: 90 TABLET | Refills: 1 | Status: SHIPPED | OUTPATIENT
Start: 2024-01-08 | End: 2024-01-31 | Stop reason: ALTCHOICE

## 2024-01-08 ASSESSMENT — PATIENT HEALTH QUESTIONNAIRE - PHQ9
SUM OF ALL RESPONSES TO PHQ9 QUESTIONS 1 AND 2: 0
1. LITTLE INTEREST OR PLEASURE IN DOING THINGS: NOT AT ALL
2. FEELING DOWN, DEPRESSED OR HOPELESS: NOT AT ALL

## 2024-01-08 ASSESSMENT — PAIN SCALES - GENERAL: PAINLEVEL: 0-NO PAIN

## 2024-01-08 NOTE — PROGRESS NOTES
Patient is here 6-month follow-up of cholesterol, acid reflux, blood pressure.  Is been doing well.  He has been having the shots in his eyes once a month as well.    REVIEW OF SYSTEMS: 12 systems negative except for those mentioned in the HPI.    PHYSICAL EXAMINATION:   Constitutional: The patient is alert, in no acute  distress.  Eyes: Extraocular movements are intact.   ENT: external ear canals patent  Neck: no  JVD.  Heart: no JVD  Lungs: Normal respiration without stridor or nasal flaring   Psychiatric: Good judgment and insight. Normal affect and mood.  Skin: no rashes or lesions    Assessment:  per EMR    Plan:  Blood pressure is doing well.  Medications refilled.  Cholesterol acid reflux been stable he is followed up also with EGD and colonoscopy and decide colonoscopy because of the pencil stools as well that was all normal.  Also I will go ahead and do a calcium score test due to risk factors and also wife with recent TIA.  Follow-up in 6 months    This dictation was created using Dragon dictation and may contain errors

## 2024-01-26 ENCOUNTER — HOSPITAL ENCOUNTER (OUTPATIENT)
Dept: RADIOLOGY | Facility: CLINIC | Age: 79
Discharge: HOME | End: 2024-01-26
Payer: MEDICARE

## 2024-01-26 DIAGNOSIS — E78.2 MIXED HYPERLIPIDEMIA: ICD-10-CM

## 2024-01-26 DIAGNOSIS — I10 BENIGN HYPERTENSION: ICD-10-CM

## 2024-01-26 DIAGNOSIS — Z00.00 WELLNESS EXAMINATION: ICD-10-CM

## 2024-01-26 PROCEDURE — 75571 CT HRT W/O DYE W/CA TEST: CPT

## 2024-01-29 ENCOUNTER — TELEPHONE (OUTPATIENT)
Dept: PRIMARY CARE | Facility: CLINIC | Age: 79
End: 2024-01-29
Payer: MEDICARE

## 2024-01-29 DIAGNOSIS — R93.1 ELEVATED CORONARY ARTERY CALCIUM SCORE: Primary | ICD-10-CM

## 2024-01-29 NOTE — TELEPHONE ENCOUNTER
Pt called and said he did the CT cardiac score, he would like Dr Ashley to take a look at this and discuss the results with him

## 2024-01-31 ENCOUNTER — LAB (OUTPATIENT)
Dept: LAB | Facility: LAB | Age: 79
End: 2024-01-31
Payer: MEDICARE

## 2024-01-31 ENCOUNTER — OFFICE VISIT (OUTPATIENT)
Dept: CARDIOLOGY | Facility: CLINIC | Age: 79
End: 2024-01-31
Payer: MEDICARE

## 2024-01-31 VITALS
HEART RATE: 64 BPM | SYSTOLIC BLOOD PRESSURE: 124 MMHG | DIASTOLIC BLOOD PRESSURE: 80 MMHG | WEIGHT: 182.4 LBS | HEIGHT: 67 IN | BODY MASS INDEX: 28.63 KG/M2

## 2024-01-31 DIAGNOSIS — E78.2 MIXED HYPERLIPIDEMIA: ICD-10-CM

## 2024-01-31 DIAGNOSIS — I25.10 CORONARY ARTERY DISEASE INVOLVING NATIVE CORONARY ARTERY OF NATIVE HEART, UNSPECIFIED WHETHER ANGINA PRESENT: ICD-10-CM

## 2024-01-31 DIAGNOSIS — Z76.89 ESTABLISHING CARE WITH NEW DOCTOR, ENCOUNTER FOR: ICD-10-CM

## 2024-01-31 DIAGNOSIS — Z01.818 PRE-OP TESTING: ICD-10-CM

## 2024-01-31 DIAGNOSIS — Z82.49 FAMILY HISTORY OF ISCHEMIC HEART DISEASE: ICD-10-CM

## 2024-01-31 DIAGNOSIS — R93.1 ELEVATED CORONARY ARTERY CALCIUM SCORE: ICD-10-CM

## 2024-01-31 DIAGNOSIS — I10 BENIGN HYPERTENSION: ICD-10-CM

## 2024-01-31 DIAGNOSIS — Z87.891 FORMER CIGARETTE SMOKER: ICD-10-CM

## 2024-01-31 LAB
ANION GAP SERPL CALC-SCNC: 12 MMOL/L (ref 10–20)
BUN SERPL-MCNC: 16 MG/DL (ref 6–23)
CALCIUM SERPL-MCNC: 10 MG/DL (ref 8.6–10.3)
CHLORIDE SERPL-SCNC: 104 MMOL/L (ref 98–107)
CHOLEST SERPL-MCNC: 164 MG/DL (ref 0–199)
CHOLESTEROL/HDL RATIO: 4.4
CO2 SERPL-SCNC: 29 MMOL/L (ref 21–32)
CREAT SERPL-MCNC: 0.97 MG/DL (ref 0.5–1.3)
EGFRCR SERPLBLD CKD-EPI 2021: 79 ML/MIN/1.73M*2
ERYTHROCYTE [DISTWIDTH] IN BLOOD BY AUTOMATED COUNT: 11.9 % (ref 11.5–14.5)
GLUCOSE SERPL-MCNC: 100 MG/DL (ref 74–99)
HCT VFR BLD AUTO: 43.4 % (ref 41–52)
HDLC SERPL-MCNC: 36.9 MG/DL
HGB BLD-MCNC: 15 G/DL (ref 13.5–17.5)
LDLC SERPL CALC-MCNC: 89 MG/DL
MCH RBC QN AUTO: 31.9 PG (ref 26–34)
MCHC RBC AUTO-ENTMCNC: 34.6 G/DL (ref 32–36)
MCV RBC AUTO: 92 FL (ref 80–100)
NON HDL CHOLESTEROL: 127 MG/DL (ref 0–149)
NRBC BLD-RTO: 0 /100 WBCS (ref 0–0)
PLATELET # BLD AUTO: 194 X10*3/UL (ref 150–450)
POTASSIUM SERPL-SCNC: 3.7 MMOL/L (ref 3.5–5.3)
RBC # BLD AUTO: 4.7 X10*6/UL (ref 4.5–5.9)
SODIUM SERPL-SCNC: 141 MMOL/L (ref 136–145)
TRIGL SERPL-MCNC: 190 MG/DL (ref 0–149)
VLDL: 38 MG/DL (ref 0–40)
WBC # BLD AUTO: 8.3 X10*3/UL (ref 4.4–11.3)

## 2024-01-31 PROCEDURE — 1036F TOBACCO NON-USER: CPT | Performed by: INTERNAL MEDICINE

## 2024-01-31 PROCEDURE — 36415 COLL VENOUS BLD VENIPUNCTURE: CPT

## 2024-01-31 PROCEDURE — 1159F MED LIST DOCD IN RCRD: CPT | Performed by: INTERNAL MEDICINE

## 2024-01-31 PROCEDURE — 3074F SYST BP LT 130 MM HG: CPT | Performed by: INTERNAL MEDICINE

## 2024-01-31 PROCEDURE — 80061 LIPID PANEL: CPT

## 2024-01-31 PROCEDURE — 99204 OFFICE O/P NEW MOD 45 MIN: CPT | Performed by: INTERNAL MEDICINE

## 2024-01-31 PROCEDURE — 1126F AMNT PAIN NOTED NONE PRSNT: CPT | Performed by: INTERNAL MEDICINE

## 2024-01-31 PROCEDURE — 85027 COMPLETE CBC AUTOMATED: CPT

## 2024-01-31 PROCEDURE — 93000 ELECTROCARDIOGRAM COMPLETE: CPT | Performed by: INTERNAL MEDICINE

## 2024-01-31 PROCEDURE — 3079F DIAST BP 80-89 MM HG: CPT | Performed by: INTERNAL MEDICINE

## 2024-01-31 PROCEDURE — 80048 BASIC METABOLIC PNL TOTAL CA: CPT

## 2024-01-31 RX ORDER — ATORVASTATIN CALCIUM 40 MG/1
40 TABLET, FILM COATED ORAL DAILY
Qty: 90 TABLET | Refills: 3 | Status: SHIPPED | OUTPATIENT
Start: 2024-01-31 | End: 2024-01-31 | Stop reason: ENTERED-IN-ERROR

## 2024-01-31 RX ORDER — NAPROXEN SODIUM 220 MG/1
81 TABLET, FILM COATED ORAL DAILY
Qty: 30 TABLET | Refills: 11
Start: 2024-01-31 | End: 2025-01-30

## 2024-01-31 RX ORDER — ATORVASTATIN CALCIUM 40 MG/1
40 TABLET, FILM COATED ORAL DAILY
Qty: 90 TABLET | Refills: 3 | Status: ON HOLD | OUTPATIENT
Start: 2024-01-31 | End: 2024-02-28 | Stop reason: SDUPTHER

## 2024-01-31 NOTE — PATIENT INSTRUCTIONS
Please STOP the Simvastatin and START the Atorvastatin 40 mg once a day    START Aspirin  81 mg once a day    Please have your lab work done for cholesterol 2 months after starting Atorvastatin.    You are being scheduled for a cardiac catheterization and possible stent  at [facility]    Continue same medications/treatment.  Patient educated on proper medication use.  Please bring all medicines, vitamins and herbal supplements with you when you come to the office.    IKayla LPN, am scribing for and in the presence of Dr. Pedro Zelaya MD, FACC

## 2024-01-31 NOTE — H&P (VIEW-ONLY)
CARDIOLOGY OFFICE VISIT      CHIEF COMPLAINT  Abnormal CT coronary artery calcium score    HISTORY OF PRESENT ILLNESS  The patient is being seen today because of abnormal CT coronary artery calcium score recently.  His calcium score was markedly elevated at 4402.  Most of the calcium was in the LAD and RCA.  There was also some calcium in the left main coronary artery.  The patient does not have any past history of cardiac pathology.  The patient denies chest discomfort.  He denies symptoms suggest myocardial ischemia.  He denies any significant problem with dyspnea on exertion.  He denies palpitations and syncope.  He is a former cigarette smoker.  He uses nicotine gum once in a while.  He does have a history of hypertension under treatment.  He does have a history of hyperlipidemia under treatment.  He does not have any history of diabetes mellitus.  He does have a family history of coronary artery disease and myocardial infarction.  I did discuss with the patient that the calcium score is very high.  This is a very high risk abnormal calcium score.  The patient has been reading about this a lot.  He wishes to have cardiac catheterization performed.  He states he talked with his family physician and he also would like him to have cardiac catheterization.  I explained the patient and his wife cardiac catheterization with possible PCI.  They are agreeable and wished to proceed.    EKG: Normal sinus rhythm within normal limits, results discussed with patient    Lipid profile: Done 7/11/2023, cholesterol 177, HDL 38, , triglycerides 190    Impression:  Coronary artery disease manifested by high risk abnormal CT coronary calcium score of 4402, will perform cardiac catheterization and possible PCI, procedure risk explained, patient wishes to proceed  Hypertension  Hyperlipidemia, will attempt to obtain LDL cholesterol 70 or below  Former smoker  Overweight  Family history of CAD and myocardial  infarction    Please excuse any errors in grammar or translation related to this dictation.  Voice recognition software was utilized to prepare this document.    Past Medical History  Past Medical History:   Diagnosis Date    Exudative age-related macular degeneration, left eye, stage unspecified (CMS/[facility]) 01/31/2018    Age-related macular degeneration, wet, left eye    GERD (gastroesophageal reflux disease)     Hyperlipidemia     Hypertension     Personal history of other endocrine, nutritional and metabolic disease 01/24/2018    History of high cholesterol       Social History  Social History     Tobacco Use    Smoking status: Former     Types: Cigarettes    Smokeless tobacco: Never   Vaping Use    Vaping Use: Never used   Substance Use Topics    Alcohol use: Yes     Alcohol/week: 2.0 standard drinks of alcohol     Types: 2 Standard drinks or equivalent per week     Comment: occassionally    Drug use: Never       Family History     Family History   Problem Relation Name Age of Onset    Other (stroke syndrome) Mother      Lung cancer Father      No Known Problems Other          Allergies:  Allergies   Allergen Reactions    Penicillins Unknown        Outpatient Medications:  Current Outpatient Medications   Medication Instructions    amLODIPine (NORVASC) 2.5 mg, oral, Daily    benazepriL-hydrochlorothiazide (Lotensin HCT) 20-25 mg tablet 1 tablet, oral, Daily    cholecalciferol (Vitamin D-3) 50 MCG (2000 UT) tablet 1 tablet, oral, Daily    pantoprazole (PROTONIX) 40 mg, oral, Daily before breakfast, Do not crush, chew, or split.    simvastatin (ZOCOR) 20 mg, oral, Nightly          REVIEW OF SYSTEMS  Review of Systems   All other systems reviewed and are negative.        VITALS  Vitals:    01/31/24 1443   BP: 124/80   Pulse: 64       PHYSICAL EXAM  Constitutional:       Appearance: Healthy appearance. Not in distress.   Eyes:      Conjunctiva/sclera: Conjunctivae normal.      Pupils: Pupils are equal, round, and  reactive to light.   Neck:      Vascular: No JVR. JVD normal.   Pulmonary:      Effort: Pulmonary effort is normal.      Breath sounds: Normal breath sounds. No wheezing. No rhonchi. No rales.   Chest:      Chest wall: Not tender to palpatation.   Cardiovascular:      PMI at left midclavicular line. Normal rate. Regular rhythm. Normal S1. Normal S2.       Murmurs: There is no murmur.      No gallop.  No click. No rub.   Pulses:     Intact distal pulses.   Edema:     Peripheral edema absent.   Abdominal:      Tenderness: There is no abdominal tenderness.   Musculoskeletal: Normal range of motion.         General: No tenderness.      Cervical back: Normal range of motion. Skin:     General: Skin is warm and dry.   Neurological:      General: No focal deficit present.      Mental Status: Alert and oriented to person, place and time.           ASSESSMENT AND PLAN  Diagnoses and all orders for this visit:  Establishing care with new doctor, encounter for  -     ECG 12 lead (Clinic Performed)  Coronary artery disease involving native coronary artery of native heart, unspecified whether angina present  -     Case Request Cath Lab: Left Heart Cath  -     Basic Metabolic Panel; Future  -     CBC; Future  -     aspirin 81 mg chewable tablet; Chew 1 tablet (81 mg) once daily.  Elevated coronary artery calcium score  -     Case Request Cath Lab: Left Heart Cath  -     Basic Metabolic Panel; Future  -     CBC; Future  Benign hypertension  Mixed hyperlipidemia  -     Lipid Panel; Future  -     atorvastatin (Lipitor) 40 mg tablet; Take 1 tablet (40 mg) by mouth once daily.  Family history of ischemic heart disease  -     Case Request Cath Lab: Left Heart Cath  BMI 28.0-28.9,adult  Former cigarette smoker  Pre-op testing  -     Basic Metabolic Panel; Future  -     CBC; Future      [unfilled]

## 2024-01-31 NOTE — PROGRESS NOTES
CARDIOLOGY OFFICE VISIT      CHIEF COMPLAINT  Abnormal CT coronary artery calcium score    HISTORY OF PRESENT ILLNESS  The patient is being seen today because of abnormal CT coronary artery calcium score recently.  His calcium score was markedly elevated at 4402.  Most of the calcium was in the LAD and RCA.  There was also some calcium in the left main coronary artery.  The patient does not have any past history of cardiac pathology.  The patient denies chest discomfort.  He denies symptoms suggest myocardial ischemia.  He denies any significant problem with dyspnea on exertion.  He denies palpitations and syncope.  He is a former cigarette smoker.  He uses nicotine gum once in a while.  He does have a history of hypertension under treatment.  He does have a history of hyperlipidemia under treatment.  He does not have any history of diabetes mellitus.  He does have a family history of coronary artery disease and myocardial infarction.  I did discuss with the patient that the calcium score is very high.  This is a very high risk abnormal calcium score.  The patient has been reading about this a lot.  He wishes to have cardiac catheterization performed.  He states he talked with his family physician and he also would like him to have cardiac catheterization.  I explained the patient and his wife cardiac catheterization with possible PCI.  They are agreeable and wished to proceed.    EKG: Normal sinus rhythm within normal limits, results discussed with patient    Lipid profile: Done 7/11/2023, cholesterol 177, HDL 38, , triglycerides 190    Impression:  Coronary artery disease manifested by high risk abnormal CT coronary calcium score of 4402, will perform cardiac catheterization and possible PCI, procedure risk explained, patient wishes to proceed  Hypertension  Hyperlipidemia, will attempt to obtain LDL cholesterol 70 or below  Former smoker  Overweight  Family history of CAD and myocardial  infarction    Please excuse any errors in grammar or translation related to this dictation.  Voice recognition software was utilized to prepare this document.    Past Medical History  Past Medical History:   Diagnosis Date    Exudative age-related macular degeneration, left eye, stage unspecified (CMS/[facility]) 01/31/2018    Age-related macular degeneration, wet, left eye    GERD (gastroesophageal reflux disease)     Hyperlipidemia     Hypertension     Personal history of other endocrine, nutritional and metabolic disease 01/24/2018    History of high cholesterol       Social History  Social History     Tobacco Use    Smoking status: Former     Types: Cigarettes    Smokeless tobacco: Never   Vaping Use    Vaping Use: Never used   Substance Use Topics    Alcohol use: Yes     Alcohol/week: 2.0 standard drinks of alcohol     Types: 2 Standard drinks or equivalent per week     Comment: occassionally    Drug use: Never       Family History     Family History   Problem Relation Name Age of Onset    Other (stroke syndrome) Mother      Lung cancer Father      No Known Problems Other          Allergies:  Allergies   Allergen Reactions    Penicillins Unknown        Outpatient Medications:  Current Outpatient Medications   Medication Instructions    amLODIPine (NORVASC) 2.5 mg, oral, Daily    benazepriL-hydrochlorothiazide (Lotensin HCT) 20-25 mg tablet 1 tablet, oral, Daily    cholecalciferol (Vitamin D-3) 50 MCG (2000 UT) tablet 1 tablet, oral, Daily    pantoprazole (PROTONIX) 40 mg, oral, Daily before breakfast, Do not crush, chew, or split.    simvastatin (ZOCOR) 20 mg, oral, Nightly          REVIEW OF SYSTEMS  Review of Systems   All other systems reviewed and are negative.        VITALS  Vitals:    01/31/24 1443   BP: 124/80   Pulse: 64       PHYSICAL EXAM  Constitutional:       Appearance: Healthy appearance. Not in distress.   Eyes:      Conjunctiva/sclera: Conjunctivae normal.      Pupils: Pupils are equal, round, and  reactive to light.   Neck:      Vascular: No JVR. JVD normal.   Pulmonary:      Effort: Pulmonary effort is normal.      Breath sounds: Normal breath sounds. No wheezing. No rhonchi. No rales.   Chest:      Chest wall: Not tender to palpatation.   Cardiovascular:      PMI at left midclavicular line. Normal rate. Regular rhythm. Normal S1. Normal S2.       Murmurs: There is no murmur.      No gallop.  No click. No rub.   Pulses:     Intact distal pulses.   Edema:     Peripheral edema absent.   Abdominal:      Tenderness: There is no abdominal tenderness.   Musculoskeletal: Normal range of motion.         General: No tenderness.      Cervical back: Normal range of motion. Skin:     General: Skin is warm and dry.   Neurological:      General: No focal deficit present.      Mental Status: Alert and oriented to person, place and time.           ASSESSMENT AND PLAN  Diagnoses and all orders for this visit:  Establishing care with new doctor, encounter for  -     ECG 12 lead (Clinic Performed)  Coronary artery disease involving native coronary artery of native heart, unspecified whether angina present  -     Case Request Cath Lab: Left Heart Cath  -     Basic Metabolic Panel; Future  -     CBC; Future  -     aspirin 81 mg chewable tablet; Chew 1 tablet (81 mg) once daily.  Elevated coronary artery calcium score  -     Case Request Cath Lab: Left Heart Cath  -     Basic Metabolic Panel; Future  -     CBC; Future  Benign hypertension  Mixed hyperlipidemia  -     Lipid Panel; Future  -     atorvastatin (Lipitor) 40 mg tablet; Take 1 tablet (40 mg) by mouth once daily.  Family history of ischemic heart disease  -     Case Request Cath Lab: Left Heart Cath  BMI 28.0-28.9,adult  Former cigarette smoker  Pre-op testing  -     Basic Metabolic Panel; Future  -     CBC; Future      [unfilled]

## 2024-02-02 ENCOUNTER — HOSPITAL ENCOUNTER (OUTPATIENT)
Facility: HOSPITAL | Age: 79
Setting detail: OUTPATIENT SURGERY
Discharge: HOME | End: 2024-02-02
Attending: INTERNAL MEDICINE | Admitting: INTERNAL MEDICINE
Payer: MEDICARE

## 2024-02-02 ENCOUNTER — APPOINTMENT (OUTPATIENT)
Dept: CARDIOLOGY | Facility: CLINIC | Age: 79
End: 2024-02-02
Payer: MEDICARE

## 2024-02-02 ENCOUNTER — APPOINTMENT (OUTPATIENT)
Dept: RADIOLOGY | Facility: HOSPITAL | Age: 79
End: 2024-02-02
Payer: MEDICARE

## 2024-02-02 ENCOUNTER — APPOINTMENT (OUTPATIENT)
Dept: CARDIOLOGY | Facility: HOSPITAL | Age: 79
End: 2024-02-02
Payer: MEDICARE

## 2024-02-02 VITALS
OXYGEN SATURATION: 99 % | BODY MASS INDEX: 29.41 KG/M2 | DIASTOLIC BLOOD PRESSURE: 79 MMHG | HEART RATE: 76 BPM | SYSTOLIC BLOOD PRESSURE: 122 MMHG | WEIGHT: 182.98 LBS | TEMPERATURE: 98.2 F | HEIGHT: 66 IN | RESPIRATION RATE: 12 BRPM

## 2024-02-02 DIAGNOSIS — I25.10 CAD (CORONARY ARTERY DISEASE): ICD-10-CM

## 2024-02-02 DIAGNOSIS — I25.10 CORONARY ARTERY DISEASE INVOLVING NATIVE CORONARY ARTERY OF NATIVE HEART, UNSPECIFIED WHETHER ANGINA PRESENT: Primary | ICD-10-CM

## 2024-02-02 DIAGNOSIS — I74.4 EMBOLISM AND THROMBOSIS OF ARTERIES OF EXTREMITIES, UNSPECIFIED (MULTI): ICD-10-CM

## 2024-02-02 DIAGNOSIS — R07.9 CHEST PAIN, UNSPECIFIED: ICD-10-CM

## 2024-02-02 DIAGNOSIS — Z01.810 ENCOUNTER FOR PREPROCEDURAL CARDIOVASCULAR EXAMINATION: ICD-10-CM

## 2024-02-02 DIAGNOSIS — Z82.49 FAMILY HISTORY OF ISCHEMIC HEART DISEASE: ICD-10-CM

## 2024-02-02 DIAGNOSIS — R06.9 UNSPECIFIED ABNORMALITIES OF BREATHING: ICD-10-CM

## 2024-02-02 DIAGNOSIS — R93.1 ELEVATED CORONARY ARTERY CALCIUM SCORE: ICD-10-CM

## 2024-02-02 DIAGNOSIS — I79.8 OTHER DISORDERS OF ARTERIES, ARTERIOLES AND CAPILLARIES IN DISEASES CLASSIFIED ELSEWHERE (CMS-HCC): ICD-10-CM

## 2024-02-02 DIAGNOSIS — E78.2 MIXED HYPERLIPIDEMIA: ICD-10-CM

## 2024-02-02 DIAGNOSIS — I20.9 ANGINA PECTORIS (CMS-HCC): ICD-10-CM

## 2024-02-02 DIAGNOSIS — Z87.891 FORMER CIGARETTE SMOKER: ICD-10-CM

## 2024-02-02 LAB
AORTIC VALVE MEAN GRADIENT: 3 MMHG
AORTIC VALVE PEAK VELOCITY: 1.27 M/S
ATRIAL RATE: 52 BPM
AV PEAK GRADIENT: 6.5 MMHG
AVA (PEAK VEL): 2.67 CM2
AVA (VTI): 2.58 CM2
EJECTION FRACTION APICAL 4 CHAMBER: 60.6
EJECTION FRACTION: 60 %
LEFT ATRIUM VOLUME AREA LENGTH INDEX BSA: 24.5 ML/M2
LEFT VENTRICLE INTERNAL DIMENSION DIASTOLE: 3.86 CM (ref 3.5–6)
LEFT VENTRICULAR OUTFLOW TRACT DIAMETER: 2 CM
MITRAL VALVE E/A RATIO: 1.26
MITRAL VALVE E/E' RATIO: 5.7
P AXIS: 36 DEGREES
P OFFSET: 157 MS
P ONSET: 106 MS
PR INTERVAL: 216 MS
Q ONSET: 214 MS
QRS COUNT: 9 BEATS
QRS DURATION: 82 MS
QT INTERVAL: 436 MS
QTC CALCULATION(BAZETT): 405 MS
QTC FREDERICIA: 415 MS
R AXIS: -8 DEGREES
RIGHT VENTRICLE FREE WALL PEAK S': 15 CM/S
RIGHT VENTRICLE PEAK SYSTOLIC PRESSURE: 30.2 MMHG
T AXIS: -6 DEGREES
T OFFSET: 432 MS
TRICUSPID ANNULAR PLANE SYSTOLIC EXCURSION: 2.6 CM
VENTRICULAR RATE: 52 BPM

## 2024-02-02 PROCEDURE — 99152 MOD SED SAME PHYS/QHP 5/>YRS: CPT | Performed by: INTERNAL MEDICINE

## 2024-02-02 PROCEDURE — 71250 CT THORAX DX C-: CPT | Performed by: RADIOLOGY

## 2024-02-02 PROCEDURE — 99222 1ST HOSP IP/OBS MODERATE 55: CPT | Performed by: NURSE PRACTITIONER

## 2024-02-02 PROCEDURE — 93005 ELECTROCARDIOGRAM TRACING: CPT | Mod: 59

## 2024-02-02 PROCEDURE — 93458 L HRT ARTERY/VENTRICLE ANGIO: CPT | Performed by: INTERNAL MEDICINE

## 2024-02-02 PROCEDURE — 99152 MOD SED SAME PHYS/QHP 5/>YRS: CPT

## 2024-02-02 PROCEDURE — C1760 CLOSURE DEV, VASC: HCPCS | Performed by: INTERNAL MEDICINE

## 2024-02-02 PROCEDURE — 7100000009 HC PHASE TWO TIME - INITIAL BASE CHARGE: Performed by: INTERNAL MEDICINE

## 2024-02-02 PROCEDURE — 7100000010 HC PHASE TWO TIME - EACH INCREMENTAL 1 MINUTE: Performed by: INTERNAL MEDICINE

## 2024-02-02 PROCEDURE — 2500000005 HC RX 250 GENERAL PHARMACY W/O HCPCS: Performed by: INTERNAL MEDICINE

## 2024-02-02 PROCEDURE — 93306 TTE W/DOPPLER COMPLETE: CPT

## 2024-02-02 PROCEDURE — G0269 OCCLUSIVE DEVICE IN VEIN ART: HCPCS | Mod: TC,59 | Performed by: INTERNAL MEDICINE

## 2024-02-02 PROCEDURE — 99153 MOD SED SAME PHYS/QHP EA: CPT

## 2024-02-02 PROCEDURE — 2550000001 HC RX 255 CONTRASTS: Performed by: INTERNAL MEDICINE

## 2024-02-02 PROCEDURE — 2500000004 HC RX 250 GENERAL PHARMACY W/ HCPCS (ALT 636 FOR OP/ED): Performed by: INTERNAL MEDICINE

## 2024-02-02 PROCEDURE — 93880 EXTRACRANIAL BILAT STUDY: CPT

## 2024-02-02 PROCEDURE — 2500000004 HC RX 250 GENERAL PHARMACY W/ HCPCS (ALT 636 FOR OP/ED): Performed by: NURSE PRACTITIONER

## 2024-02-02 PROCEDURE — 71250 CT THORAX DX C-: CPT

## 2024-02-02 PROCEDURE — 2500000001 HC RX 250 WO HCPCS SELF ADMINISTERED DRUGS (ALT 637 FOR MEDICARE OP): Performed by: NURSE PRACTITIONER

## 2024-02-02 PROCEDURE — 93880 EXTRACRANIAL BILAT STUDY: CPT | Performed by: RADIOLOGY

## 2024-02-02 PROCEDURE — 93970 EXTREMITY STUDY: CPT

## 2024-02-02 PROCEDURE — 93306 TTE W/DOPPLER COMPLETE: CPT | Performed by: INTERNAL MEDICINE

## 2024-02-02 PROCEDURE — 2720000007 HC OR 272 NO HCPCS: Performed by: INTERNAL MEDICINE

## 2024-02-02 PROCEDURE — 2780000003 HC OR 278 NO HCPCS: Performed by: INTERNAL MEDICINE

## 2024-02-02 RX ORDER — LISINOPRIL 5 MG/1
5 TABLET ORAL DAILY
Status: CANCELLED | OUTPATIENT
Start: 2024-02-02

## 2024-02-02 RX ORDER — LIDOCAINE HYDROCHLORIDE 20 MG/ML
INJECTION, SOLUTION INFILTRATION; PERINEURAL AS NEEDED
Status: DISCONTINUED | OUTPATIENT
Start: 2024-02-02 | End: 2024-02-02 | Stop reason: HOSPADM

## 2024-02-02 RX ORDER — MIDAZOLAM HYDROCHLORIDE 1 MG/ML
INJECTION, SOLUTION INTRAMUSCULAR; INTRAVENOUS AS NEEDED
Status: DISCONTINUED | OUTPATIENT
Start: 2024-02-02 | End: 2024-02-02 | Stop reason: HOSPADM

## 2024-02-02 RX ORDER — SODIUM CHLORIDE 9 MG/ML
100 INJECTION, SOLUTION INTRAVENOUS CONTINUOUS
Status: DISCONTINUED | OUTPATIENT
Start: 2024-02-02 | End: 2024-02-02

## 2024-02-02 RX ORDER — NITROGLYCERIN 0.4 MG/1
0.4 TABLET SUBLINGUAL EVERY 5 MIN PRN
Qty: 25 TABLET | Refills: 3 | Status: SHIPPED | OUTPATIENT
Start: 2024-02-02 | End: 2024-02-28 | Stop reason: HOSPADM

## 2024-02-02 RX ORDER — MIDAZOLAM HYDROCHLORIDE 1 MG/ML
INJECTION INTRAMUSCULAR; INTRAVENOUS AS NEEDED
Status: DISCONTINUED | OUTPATIENT
Start: 2024-02-02 | End: 2024-02-02 | Stop reason: HOSPADM

## 2024-02-02 RX ORDER — ASPIRIN 325 MG
325 TABLET ORAL ONCE
Status: DISCONTINUED | OUTPATIENT
Start: 2024-02-02 | End: 2024-02-02

## 2024-02-02 RX ORDER — RANOLAZINE 500 MG/1
500 TABLET, EXTENDED RELEASE ORAL ONCE
Status: COMPLETED | OUTPATIENT
Start: 2024-02-02 | End: 2024-02-02

## 2024-02-02 RX ORDER — CARVEDILOL 3.12 MG/1
3.12 TABLET ORAL 2 TIMES DAILY
Status: CANCELLED | OUTPATIENT
Start: 2024-02-02

## 2024-02-02 RX ORDER — FENTANYL CITRATE 50 UG/ML
INJECTION, SOLUTION INTRAMUSCULAR; INTRAVENOUS AS NEEDED
Status: DISCONTINUED | OUTPATIENT
Start: 2024-02-02 | End: 2024-02-02 | Stop reason: HOSPADM

## 2024-02-02 RX ORDER — SODIUM CHLORIDE 9 MG/ML
100 INJECTION, SOLUTION INTRAVENOUS CONTINUOUS
Status: ACTIVE | OUTPATIENT
Start: 2024-02-02 | End: 2024-02-02

## 2024-02-02 RX ORDER — ATORVASTATIN CALCIUM 20 MG/1
80 TABLET, FILM COATED ORAL NIGHTLY
Status: CANCELLED | OUTPATIENT
Start: 2024-02-02

## 2024-02-02 RX ADMIN — RANOLAZINE 500 MG: 500 TABLET, FILM COATED, EXTENDED RELEASE ORAL at 06:20

## 2024-02-02 RX ADMIN — SODIUM CHLORIDE 100 ML/HR: 9 INJECTION, SOLUTION INTRAVENOUS at 06:20

## 2024-02-02 RX ADMIN — PERFLUTREN 1 ML OF DILUTION: 6.52 INJECTION, SUSPENSION INTRAVENOUS at 12:24

## 2024-02-02 ASSESSMENT — PAIN SCALES - GENERAL
PAINLEVEL_OUTOF10: 0 - NO PAIN

## 2024-02-02 ASSESSMENT — PAIN - FUNCTIONAL ASSESSMENT
PAIN_FUNCTIONAL_ASSESSMENT: 0-10

## 2024-02-02 ASSESSMENT — ENCOUNTER SYMPTOMS
MUSCULOSKELETAL NEGATIVE: 1
DIZZINESS: 0
CHEST TIGHTNESS: 0
PALPITATIONS: 0
NAUSEA: 0
SHORTNESS OF BREATH: 0
WEAKNESS: 0
FATIGUE: 1

## 2024-02-02 ASSESSMENT — COLUMBIA-SUICIDE SEVERITY RATING SCALE - C-SSRS
6. HAVE YOU EVER DONE ANYTHING, STARTED TO DO ANYTHING, OR PREPARED TO DO ANYTHING TO END YOUR LIFE?: NO
1. IN THE PAST MONTH, HAVE YOU WISHED YOU WERE DEAD OR WISHED YOU COULD GO TO SLEEP AND NOT WAKE UP?: NO
2. HAVE YOU ACTUALLY HAD ANY THOUGHTS OF KILLING YOURSELF?: NO

## 2024-02-02 NOTE — CONSULTS
"Inpatient consult to Cardiothoracic Surgery  Consult performed by: NATE Espinoza-CNP  Consult ordered by: MORGAN Mcgregor  Reason for consult: Two vessel CAD/recommending CABG          Reason For Consult  Two vessel CAD/recommending CABG    History Of Present Illness  Sylvester Carvalho is a 78 y.o. male with PMHx of HTN, HLD, and nicotine dependence (quit smoking 20+ years ago but occasionally uses nicotine gum). At a routine PCP visit, coronary CT was recommended for screening. This revealed a calcium score of 4402 and patient was referred to Dr. Zelaya for further evaluation. He subsequently presented to [facility] today and underwent elective coronary angiogram revealing two vessel CAD including 90% stenosis of proximal LAD affecting the large diagonal. Cardiac surgery has been asked to evaluate for surgical revascularization.    Pt seen in recovery area with wife and daughter at bedside. He is afebrile. Maintaining sinus rhythm with occasional bradycardia into 50s; of note, BB not initiated for this reason. Pt is normotensive. Maintaining adequate SpO2 on RA. He denies chest pain, palpitations, SOB, GAVIRIA, orthopnea, peripheral edema, N/V, and diaphoresis. Upon further questioning he does report mild fatigue but otherwise continues to deny anginal symptoms. He lives independently with his wife and endorses a healthy/active lifestyle. He and his wife \"speed walk\" regularly. He does his own yard work. Reports using a push mower and does note that it takes him longer than previously. He and his wife are agreeable to proceeding with surgical revascularization and are willing to stay to get pre-op testing completed. Will obtain echo, carotid duplex, vein mapping, and CT chest prior to DC. Continue ASA, Statin, Norvasc, and Lotensin for now (Lotensin will need held 48 hours prior to surgery). Signs/symptoms to present to ED discussed. Pt will follow up in office 2/8 with further recommendations to follow. " Thank you for including us in the care of this delightful patient.      Past Medical History  He has a past medical history of Exudative age-related macular degeneration, left eye, stage unspecified (CMS/HCC) (01/31/2018), GERD (gastroesophageal reflux disease), Hyperlipidemia, Hypertension, and Personal history of other endocrine, nutritional and metabolic disease (01/24/2018).    Surgical History  He has a past surgical history that includes Total hip arthroplasty (01/24/2018).     Social History  He reports that he has quit smoking. His smoking use included cigarettes. He has never used smokeless tobacco. He reports current alcohol use of about 2.0 standard drinks of alcohol per week. He reports that he does not use drugs.    Family History  Family History   Problem Relation Name Age of Onset    Other (stroke syndrome) Mother      Lung cancer Father      No Known Problems Other          Allergies  Penicillins    Review of Systems   Constitutional:  Positive for fatigue.   Respiratory:  Negative for chest tightness and shortness of breath.    Cardiovascular:  Negative for chest pain, palpitations and leg swelling.   Gastrointestinal:  Negative for nausea.   Musculoskeletal: Negative.    Neurological:  Negative for dizziness, syncope and weakness.   All other systems reviewed and are negative.       Physical Exam  Constitutional:       Appearance: Normal appearance. He is normal weight.   HENT:      Head: Normocephalic and atraumatic.      Nose: Nose normal.      Mouth/Throat:      Mouth: Mucous membranes are moist.      Pharynx: Oropharynx is clear.   Eyes:      Extraocular Movements: Extraocular movements intact.      Pupils: Pupils are equal, round, and reactive to light.   Neck:      Vascular: No carotid bruit.   Cardiovascular:      Rate and Rhythm: Normal rate and regular rhythm.      Pulses: Normal pulses.      Heart sounds: No murmur heard.  Pulmonary:      Effort: Pulmonary effort is normal.      Breath  "sounds: Normal breath sounds.   Abdominal:      Comments: Protuberant but soft. Normoactive BS throughout.    Musculoskeletal:      Cervical back: Normal range of motion and neck supple.   Skin:     General: Skin is warm and dry.   Neurological:      General: No focal deficit present.      Mental Status: He is alert and oriented to person, place, and time.   Psychiatric:         Mood and Affect: Mood normal.         Behavior: Behavior normal.          Last Recorded Vitals  Blood pressure 122/79, pulse 76, temperature 36.8 °C (98.2 °F), temperature source Tympanic, resp. rate 12, height 1.676 m (5' 6\"), weight 83 kg (182 lb 15.7 oz), SpO2 99 %.    Relevant Results  Scheduled medications    Continuous medications    PRN medications  Results for orders placed or performed during the hospital encounter of 02/02/24 (from the past 24 hour(s))   ECG 12 lead STAT   Result Value Ref Range    Ventricular Rate 52 BPM    Atrial Rate 52 BPM    WY Interval 216 ms    QRS Duration 82 ms    QT Interval 436 ms    QTC Calculation(Bazett) 405 ms    P Axis 36 degrees    R Axis -8 degrees    T Axis -6 degrees    QRS Count 9 beats    Q Onset 214 ms    P Onset 106 ms    P Offset 157 ms    T Offset 432 ms    QTC Fredericia 415 ms     Cardiac catheterization - coronary    Result Date: 2/2/2024   [facility], Cath Lab        [address] [place] [address] Cardiovascular Catheterization Report Patient Name:      TANIA BASSETT         Performing Physician: Jazmine Cuellar MD Study Date:        2/2/2024             Verifying Physician:  Jazmine Cuellar MD MRN/PID:           [id number]             Cardiologist: Accession#:        [id number]         Ordering Provider:    Jazmine AVILES Date of Birth/Age: " 1945 / 78 years Fellow: Gender:            M                    Fellow: Encounter#:        [id number]  Study: Left Heart Cath  Indications: TANIA BASSETT is a 78 year old male who presents with dyslipidemia, hypertension, ex smoker, coronary artery disease and an asymptomatic chest pain assessment. Stable known coronary artery disease.  Procedure Description: After infiltration with 2% Lidocaine, the right femoral artery was cannulated with a modified Seldinger technique. Subsequently a 5 American sheath was placed in the right femoral artery. Selective coronary catheterization was performed using a 5 Fr catheter(s) exchanged over a guide wire to cannulate the coronary arteries. A JL 4 tip catheter was used for left coronary injections. A 3DRC tip catheter was used for right coronary injections. Multiple injections of contrast were made into the left and right coronary arteries with angiograms recorded in multiple projections. Retrograde left heart catheterizion was accomplished with a 5 Fr. pigtail catheter. A single plane left ventriculogram was recorded in the 30 degree GONZALES projection. The contrast dose was 20 ml injected at 10 ml/sec. The catheter was then withdrawn across the aortic valve under continuous pressure monitoring and removed. After completion of the procedure, femoral artery angiography was performed. This demonstrated a common femoral artery puncture appropriate for closure. A Vascade 5F vascular closure Device was placed per protocol.  Coronary Angiography: The coronary circulation is right dominant.  Left Main Coronary Artery: There is 10-30% stenosis in the distal left main coronary artery.  Left Anterior Descending Coronary Artery Distribution: There is 80% stenosis in the proximal left anterior descending artery. This segment is calcified. There is 40% stenosis in the mid left anterior descending artery.  Circumflex Coronary Artery Distribution: There is <10% stenosis in the the entire  Circumflex artery.  Right Coronary Artery Distribution: The Right Coronary Artery is a large vessel. There is 10-30% stenosis in the the distal and the proximal Right Coronary Artery. There is 90% stenosis in the Prox Posterolateral Ventricular Branch Right Coronary Artery. This segment is calcified.  Left Ventriculography: The estimated left ventricular ejection fraction is normal at 60%.  Hemo Personnel: +---------------------------+---------+ Name                       Duty      +---------------------------+---------+ Pedro Zelaya MD 1 +---------------------------+---------+  Hemodynamic Pressures:  +----+------------------+---------+-------------+-------------+------+---------+ Site    Date Time       Phase  Systolic mmHg  Diastolic    ED  Mean mmHg                         Name                    mmHg      mmHg           +----+------------------+---------+-------------+-------------+------+---------+   AO  2/2/2024 8:06:32 AIR REST          106           64             83                     AM                                                   +----+------------------+---------+-------------+-------------+------+---------+   AO  2/2/2024 8:07:59 AIR REST          102           64             80                     AM                                                   +----+------------------+---------+-------------+-------------+------+---------+   AO  2/2/2024 8:11:30 AIR REST          104           69             86                     AM                                                   +----+------------------+---------+-------------+-------------+------+---------+   LV  2/2/2024 8:16:13 AIR REST          113            0    14                              AM                                                   +----+------------------+---------+-------------+-------------+------+---------+   LV  2/2/2024  8:16:20 AIR REST          113            5    10                              AM                                                   +----+------------------+---------+-------------+-------------+------+---------+  Cardiac Cath Post Procedure Notes: Post Procedure Diagnosis: Double vessel disease. Blood Loss:               Estimated blood loss during the procedure was 0 mls. Specimens Removed:        Number of specimen(s) removed: none. ____________________________________________________________________________________ CONCLUSIONS:  1. Distal Left Main: 10-30% stenosis.  2. Proximal LAD Lesion: The percent stenosis is 80%.  3. Mid LAD Lesion: The percent stenosis is 40%.  4. Entire CX Lesion: The percent stenosis is <10%.  5. Distal and proximal RCA Lesion: The percent stenosis is 10-30%.  6. Prox PLVB RCA Lesion: The percent stenosis is 90 %.  7. The Left Ventricular Ejection Fraction is 60%. ICD 10 Codes: Atherosclerotic heart disease of native coronary artery without angina pectoris-I25.10  CPT Codes: Left Heart Cath (visualization of coronaries) and [id number]; Moderate Sedation Services initial 15 minutes patient >5 [id number]  [id number] Pedro Cuellar MD Performing Physician Electronically signed by [id number] Pedro Cuellar MD on 2/2/2024 at 8:35:08 AM  ** Final **     ECG 12 lead STAT    Result Date: 2/2/2024  Sinus bradycardia with 1st degree AV block Otherwise normal ECG No previous ECGs available    ECG 12 lead (Clinic Performed)    Result Date: 1/31/2024  See scanned document        Assessment/Plan     Coronary artery disease; Stable angina  Coronary CT obtained by PCP as screen; revealed Ca score of 4402 and pt referred to Cardiology. Evaluated by Dr. Daly 1/31 and underwent LHC today revealing two vessel CAD including 90% stenosis proximal LAD affecting large diagonal branch.   -Dr. Chung updated via telephone  -pt is stable to follow up in office  -pt scheduled to see Dr. Chung  next Thursday 2/8 @ 1400  -Will obtain echo, CT chest, carotid duplex, and vein mapping prior to discharge  -Continue ASA, Statin, Norvasc, and Lotensin for now  -Lotensin will need held 48 hours prior to surgery  -Rx for SL Nitroglycerine provided by Cardiology  -s/s to go to ED discussed; pt and wife verbalized understanding     HTN; HLD  -medical management per Cardiology as discussed above    Nicotine dependence  Remote smoker, quit 20+ years ago. Does use Nicotine gum.   -cessation encouraged    I spent 60 minutes in the professional and overall care of this patient.

## 2024-02-02 NOTE — SIGNIFICANT EVENT
"Hand off given by Liza Soriano RN. Focused assessment performed and WDL. Right groin site soft and stable with no hematoma or oozing. IVF discontinued. Discharge instructions given via \"teach back\" method. Covered femoral site care and restrictions, medications/when to resume them, follow up appointments, and vascade card/education. Educated Pt and family on nitroglycerin. They state understanding and answer follow up questions correctly. Messaged Luisana Rg CNP regarding CT of the chest.   "

## 2024-02-02 NOTE — PROGRESS NOTES
Patient is stable status post C under the care of Dr. Zelaya.  Discussed results of procedure with patient and his family members.  Findings of the LHC revealed severe 90% stenosis in the proximal LAD involving a large diagonal branch, 75% stenosis in the mid LAD, small circumflex artery, minimal disease in the RCA with 90% stenosis in the moderately sized PLVB and an LVEF of 60%.  Dr. Zelaya is recommending CABG due to complex LAD lesions with significant bend in the vessel as well as PLVB disease.  Will consult CT surgery.  Patient instructed to continue aspirin 81 mg daily and atorvastatin 40 mg daily as well as his amlodipine 2.5 mg daily.  He was not initiated on beta-blocker therapy due to baseline bradycardia with heart rates in the low 50s.  He will be given a prescription for Nitrostat.  Further recommendations per CT surgery.  Multiple questions answered.  All verbalized and understanding.

## 2024-02-02 NOTE — POST-PROCEDURE NOTE
Physician Transition of Care Summary  Invasive Cardiovascular Lab    Procedure Date: 2/2/2024  Attending:    * Pedro Zelaya - Primary  Resident/Fellow/Other Assistant: Surgeon(s) and Role:    Pre Procedure Diagnosis:   Coronary disease, high risk CT coronary artery calcium score, 4400    Post Procedure Diagnosis:   Two-vessel CAD, left ventricular ejection fraction 60%, recommend CABG    Complications:   None    Stents/Implants:   None    Anticoagulation/Antiplatelet Plan:   Aspirin 81 mg a day    Estimated Blood Loss:   0 mL    Electronically signed by: Pedro Zelaya MD, 2/2/2024 8:22 AM    Anesthesia: Moderate                            anesthesia Staff: None

## 2024-02-05 ENCOUNTER — OFFICE VISIT (OUTPATIENT)
Dept: OPHTHALMOLOGY | Facility: CLINIC | Age: 79
End: 2024-02-05
Payer: MEDICARE

## 2024-02-05 DIAGNOSIS — H35.3231 EXUDATIVE AGE-RELATED MACULAR DEGENERATION OF BOTH EYES WITH ACTIVE CHOROIDAL NEOVASCULARIZATION (MULTI): Primary | ICD-10-CM

## 2024-02-05 DIAGNOSIS — H44.2A3 BOTH EYES AFFECTED BY DEGENERATIVE MYOPIA WITH CHOROIDAL NEOVASCULARIZATION: ICD-10-CM

## 2024-02-05 DIAGNOSIS — H44.23: Chronic | ICD-10-CM

## 2024-02-05 DIAGNOSIS — H35.3114 ADVANCED ATROPHIC NONEXUDATIVE AGE-RELATED MACULAR DEGENERATION OF RIGHT EYE WITH SUBFOVEAL INVOLVEMENT: ICD-10-CM

## 2024-02-05 DIAGNOSIS — H35.053: Chronic | ICD-10-CM

## 2024-02-05 PROCEDURE — 67028 INJECTION EYE DRUG: CPT | Mod: BILATERAL PROCEDURE | Performed by: OPHTHALMOLOGY

## 2024-02-05 PROCEDURE — 1159F MED LIST DOCD IN RCRD: CPT | Performed by: OPHTHALMOLOGY

## 2024-02-05 PROCEDURE — 1036F TOBACCO NON-USER: CPT | Performed by: OPHTHALMOLOGY

## 2024-02-05 PROCEDURE — 1157F ADVNC CARE PLAN IN RCRD: CPT | Performed by: OPHTHALMOLOGY

## 2024-02-05 PROCEDURE — 1126F AMNT PAIN NOTED NONE PRSNT: CPT | Performed by: OPHTHALMOLOGY

## 2024-02-05 PROCEDURE — 92134 CPTRZ OPH DX IMG PST SGM RTA: CPT | Mod: BILATERAL PROCEDURE | Performed by: OPHTHALMOLOGY

## 2024-02-05 ASSESSMENT — TONOMETRY
IOP_METHOD: GOLDMANN APPLANATION
OD_IOP_MMHG: 28
OS_IOP_MMHG: 20

## 2024-02-05 ASSESSMENT — CONF VISUAL FIELD
OS_SUPERIOR_NASAL_RESTRICTION: 0
OD_NORMAL: 1
OS_SUPERIOR_TEMPORAL_RESTRICTION: 0
OD_INFERIOR_NASAL_RESTRICTION: 0
OS_INFERIOR_NASAL_RESTRICTION: 0
OS_NORMAL: 1
METHOD: COUNTING FINGERS
OD_SUPERIOR_NASAL_RESTRICTION: 0
OD_SUPERIOR_TEMPORAL_RESTRICTION: 0
OD_INFERIOR_TEMPORAL_RESTRICTION: 0
OS_INFERIOR_TEMPORAL_RESTRICTION: 0

## 2024-02-05 ASSESSMENT — ENCOUNTER SYMPTOMS
PSYCHIATRIC NEGATIVE: 0
GASTROINTESTINAL NEGATIVE: 0
EYES NEGATIVE: 1
MUSCULOSKELETAL NEGATIVE: 0
CARDIOVASCULAR NEGATIVE: 0
ALLERGIC/IMMUNOLOGIC NEGATIVE: 0
NEUROLOGICAL NEGATIVE: 0
RESPIRATORY NEGATIVE: 0
ENDOCRINE NEGATIVE: 0
HEMATOLOGIC/LYMPHATIC NEGATIVE: 0
CONSTITUTIONAL NEGATIVE: 0

## 2024-02-05 ASSESSMENT — VISUAL ACUITY
OD_CC: 20/30-2
METHOD: SNELLEN - LINEAR
CORRECTION_TYPE: GLASSES
OS_PH_CC: 20/50-2
OS_CC: 20/60-1

## 2024-02-05 NOTE — PROGRESS NOTES
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 Recommend anti-VEGF injections. Eylea done OU today in a sterile manner with Betadine 5% for antisepsis.      FU 1m

## 2024-02-06 ENCOUNTER — APPOINTMENT (OUTPATIENT)
Dept: PRIMARY CARE | Facility: CLINIC | Age: 79
End: 2024-02-06
Payer: MEDICARE

## 2024-02-08 ENCOUNTER — OFFICE VISIT (OUTPATIENT)
Dept: CARDIAC SURGERY | Facility: CLINIC | Age: 79
End: 2024-02-08
Payer: MEDICARE

## 2024-02-08 VITALS
HEIGHT: 66 IN | TEMPERATURE: 96.9 F | DIASTOLIC BLOOD PRESSURE: 79 MMHG | SYSTOLIC BLOOD PRESSURE: 134 MMHG | HEART RATE: 66 BPM | BODY MASS INDEX: 29.25 KG/M2 | OXYGEN SATURATION: 95 % | WEIGHT: 182 LBS | RESPIRATION RATE: 16 BRPM

## 2024-02-08 DIAGNOSIS — I25.10 CORONARY ARTERY DISEASE INVOLVING NATIVE CORONARY ARTERY OF NATIVE HEART WITHOUT ANGINA PECTORIS: Primary | ICD-10-CM

## 2024-02-08 PROCEDURE — 99203 OFFICE O/P NEW LOW 30 MIN: CPT | Performed by: THORACIC SURGERY (CARDIOTHORACIC VASCULAR SURGERY)

## 2024-02-08 PROCEDURE — 1159F MED LIST DOCD IN RCRD: CPT | Performed by: THORACIC SURGERY (CARDIOTHORACIC VASCULAR SURGERY)

## 2024-02-08 PROCEDURE — 1036F TOBACCO NON-USER: CPT | Performed by: THORACIC SURGERY (CARDIOTHORACIC VASCULAR SURGERY)

## 2024-02-08 PROCEDURE — 99213 OFFICE O/P EST LOW 20 MIN: CPT | Performed by: THORACIC SURGERY (CARDIOTHORACIC VASCULAR SURGERY)

## 2024-02-08 PROCEDURE — 1126F AMNT PAIN NOTED NONE PRSNT: CPT | Performed by: THORACIC SURGERY (CARDIOTHORACIC VASCULAR SURGERY)

## 2024-02-08 NOTE — PROGRESS NOTES
HPI:   Mr. Sylvester Carvalho is a 78 y.o. male, who is referred for surgical evaluation for coronary artery disease    Past Medical History:  2018: Exudative age-related macular degeneration, left eye,   stage unspecified (CMS/HCC)      Comment:  Age-related macular degeneration, wet, left eye  GERD (gastroesophageal reflux disease)  Hyperlipidemia  Hypertension  Personal history of other endocrine, nutritional and   metabolic disease      Comment:  History of high cholesterol    Past Surgical History:  2024: CARDIAC CATHETERIZATION; N/A      Comment:  Procedure: Left Heart Cath, With LV;  Surgeon: Pedro Zelaya MD;  Location: [facility];                 Service: Cardiovascular;  Laterality: N/A;  Ranexa 500 mg               on admit  2018: TOTAL HIP ARTHROPLASTY      Comment:  Hip Replacement    Review of patient's family history indicates:  Problem: Other (stroke syndrome)      Relation: Mother          Name: Marcia              Age of Onset: (Not Specified)  Problem: Stroke      Relation: Mother          Name: Marcia              Age of Onset: (Not Specified)  Problem: Lung cancer      Relation: Father          Name:               Age of Onset: (Not Specified)  Problem: No Known Problems      Relation: Other          Name:               Age of Onset: (Not Specified)      Social History    Socioeconomic History      Marital status:       Spouse name: Not on file      Number of children: Not on file      Years of education: Not on file      Highest education level: Not on file    Occupational History      Not on file    Tobacco Use      Smoking status: Former        Packs/day: 1.00        Years: 36.00        Additional pack years: 0.00        Total pack years: 36.00        Types: Cigarettes        Start date: 1964        Quit date: 2000        Years since quittin.1      Smokeless tobacco: Never      Tobacco comments: I quit smoking many many times  over the years so i didnt smoke continually for 36 years notes above    Vaping Use      Vaping Use: Never used    Substance and Sexual Activity      Alcohol use: Yes        Alcohol/week: 2.0 standard drinks of alcohol        Types: 2 Standard drinks or equivalent per week        Comment: Occasional and always socially      Drug use: Never      Sexual activity: Not Currently        Partners: Female    Other Topics      Concerns:        Not on file    Social History Narrative      Not on file    Social Determinants of Health  Financial Resource Strain: Not on file  Food Insecurity: Not on file  Transportation Needs: Not on file  Physical Activity: Not on file  Stress: Not on file  Social Connections: Not on file  Intimate Partner Violence: Not on file  Housing Stability: Not on file     -- Penicillins -- Unknown    Outpatient Encounter Medications as of 2/8/2024:  amLODIPine (Norvasc) 2.5 mg tablet, Take 1 tablet (2.5 mg) by mouth once daily., Disp: 90 tablet, Rfl: 1  aspirin 81 mg chewable tablet, Chew 1 tablet (81 mg) once daily., Disp: 30 tablet, Rfl: 11  atorvastatin (Lipitor) 40 mg tablet, Take 1 tablet (40 mg) by mouth once daily., Disp: 90 tablet, Rfl: 3  benazepriL-hydrochlorothiazide (Lotensin HCT) 20-25 mg tablet, Take 1 tablet by mouth once daily., Disp: 90 tablet, Rfl: 1  cholecalciferol (Vitamin D-3) 50 MCG (2000 UT) tablet, Take 1 tablet (2,000 Units) by mouth once daily., Disp: , Rfl:   mv-min/FA/vit K/lutein/zeaxant (PRESERVISION AREDS 2 PLUS MV ORAL), Take by mouth., Disp: , Rfl:   nitroglycerin (Nitrostat) 0.4 mg SL tablet, Place 1 tablet (0.4 mg) under the tongue every 5 minutes if needed for chest pain. May repeat every 5 minutes for up to 3 doses., Disp: 25 tablet, Rfl: 3  pantoprazole (ProtoNix) 40 mg EC tablet, Take 1 tablet (40 mg) by mouth once daily in the morning. Take before meals. Do not crush, chew, or split., Disp: 90 tablet, Rfl: 1    No facility-administered encounter medications on  file as of 2/8/2024.      [unfilled]    Encounter Date: 02/02/24  -ECG 12 lead STAT:        Result                      Value                           Ventricular Rate            52                              Atrial Rate                 52                              UT Interval                 216                             QRS Duration                82                              QT Interval                 436                             QTC Calculation(Bazett)     405                             P Axis                      36                              R Axis                      -8                              T Axis                      -6                              QRS Count                   9                               Q Onset                     214                             P Onset                     106                             P Offset                    157                             T Offset                    432                             QTC Fredericia              415                                                                                      Narrative    Sinus bradycardia with 1st degree AV block  Otherwise normal ECG  No previous ECGs available  Confirmed by Homer Nix ([id number]) on 2/2/2024 3:24:56 PM    Assessment and Plan:    Mr. Sylvester Carvalho is a 78 y.o. has severe coronary artery disease and is a good candidate for surgical revascularization.  Surgical procedure and risk factors has been discussed with the patient and he agreed to have the surgery performed and the date accepted it was February 22

## 2024-02-09 DIAGNOSIS — R07.9 CHEST PAIN, UNSPECIFIED TYPE: ICD-10-CM

## 2024-02-09 DIAGNOSIS — R79.9 ABNORMAL FINDING OF BLOOD CHEMISTRY, UNSPECIFIED: ICD-10-CM

## 2024-02-09 DIAGNOSIS — R93.89 ABNORMAL FINDINGS ON DIAGNOSTIC IMAGING OF OTHER SPECIFIED BODY STRUCTURES: ICD-10-CM

## 2024-02-09 DIAGNOSIS — I25.119 CORONARY ARTERY DISEASE INVOLVING NATIVE CORONARY ARTERY OF NATIVE HEART WITH ANGINA PECTORIS (CMS-HCC): ICD-10-CM

## 2024-02-15 ENCOUNTER — APPOINTMENT (OUTPATIENT)
Dept: CARDIAC SURGERY | Facility: CLINIC | Age: 79
End: 2024-02-15
Payer: MEDICARE

## 2024-02-15 DIAGNOSIS — I25.10 CORONARY ARTERY DISEASE INVOLVING NATIVE CORONARY ARTERY OF NATIVE HEART, UNSPECIFIED WHETHER ANGINA PRESENT: ICD-10-CM

## 2024-02-15 NOTE — PROGRESS NOTES
HPI:   Mr. Sylvester Carvalho is a 78 y.o. male, who is referred for surgical evaluation for coronary artery disease     Past Medical History:  2018: Exudative age-related macular degeneration, left eye,   stage unspecified (CMS/HCC)      Comment:  Age-related macular degeneration, wet, left eye  GERD (gastroesophageal reflux disease)  Hyperlipidemia  Hypertension  Personal history of other endocrine, nutritional and   metabolic disease      Comment:  History of high cholesterol     Past Surgical History:  2024: CARDIAC CATHETERIZATION; N/A      Comment:  Procedure: Left Heart Cath, With LV;  Surgeon: Pedro Zelaya MD;  Location: [facility];                 Service: Cardiovascular;  Laterality: N/A;  Ranexa 500 mg               on admit  2018: TOTAL HIP ARTHROPLASTY      Comment:  Hip Replacement     Review of patient's family history indicates:  Problem: Other (stroke syndrome)      Relation: Mother          Name: Marcia              Age of Onset: (Not Specified)  Problem: Stroke      Relation: Mother          Name: Marcia              Age of Onset: (Not Specified)  Problem: Lung cancer      Relation: Father          Name:               Age of Onset: (Not Specified)  Problem: No Known Problems      Relation: Other          Name:               Age of Onset: (Not Specified)        Social History    Socioeconomic History      Marital status:       Spouse name: Not on file      Number of children: Not on file      Years of education: Not on file      Highest education level: Not on file    Occupational History      Not on file    Tobacco Use      Smoking status: Former        Packs/day: 1.00        Years: 36.00        Additional pack years: 0.00        Total pack years: 36.00        Types: Cigarettes        Start date: 1964        Quit date: 2000        Years since quittin.1      Smokeless tobacco: Never      Tobacco comments: I quit smoking many many  times over the years so i didnt smoke continually for 36 years notes above    Vaping Use      Vaping Use: Never used    Substance and Sexual Activity      Alcohol use: Yes        Alcohol/week: 2.0 standard drinks of alcohol        Types: 2 Standard drinks or equivalent per week        Comment: Occasional and always socially      Drug use: Never      Sexual activity: Not Currently        Partners: Female    Other Topics      Concerns:        Not on file    Social History Narrative      Not on file     Social Determinants of Health  Financial Resource Strain: Not on file  Food Insecurity: Not on file  Transportation Needs: Not on file  Physical Activity: Not on file  Stress: Not on file  Social Connections: Not on file  Intimate Partner Violence: Not on file  Housing Stability: Not on file      -- Penicillins -- Unknown     Outpatient Encounter Medications as of 2/8/2024:  amLODIPine (Norvasc) 2.5 mg tablet, Take 1 tablet (2.5 mg) by mouth once daily., Disp: 90 tablet, Rfl: 1  aspirin 81 mg chewable tablet, Chew 1 tablet (81 mg) once daily., Disp: 30 tablet, Rfl: 11  atorvastatin (Lipitor) 40 mg tablet, Take 1 tablet (40 mg) by mouth once daily., Disp: 90 tablet, Rfl: 3  benazepriL-hydrochlorothiazide (Lotensin HCT) 20-25 mg tablet, Take 1 tablet by mouth once daily., Disp: 90 tablet, Rfl: 1  cholecalciferol (Vitamin D-3) 50 MCG (2000 UT) tablet, Take 1 tablet (2,000 Units) by mouth once daily., Disp: , Rfl:   mv-min/FA/vit K/lutein/zeaxant (PRESERVISION AREDS 2 PLUS MV ORAL), Take by mouth., Disp: , Rfl:   nitroglycerin (Nitrostat) 0.4 mg SL tablet, Place 1 tablet (0.4 mg) under the tongue every 5 minutes if needed for chest pain. May repeat every 5 minutes for up to 3 doses., Disp: 25 tablet, Rfl: 3  pantoprazole (ProtoNix) 40 mg EC tablet, Take 1 tablet (40 mg) by mouth once daily in the morning. Take before meals. Do not crush, chew, or split., Disp: 90 tablet, Rfl: 1     No facility-administered encounter  medications on file as of 2/8/2024.        [unfilled]     Encounter Date: 02/02/24  -ECG 12 lead STAT:        Result                      Value                           Ventricular Rate            52                              Atrial Rate                 52                              AK Interval                 216                             QRS Duration                82                              QT Interval                 436                             QTC Calculation(Bazett)     405                             P Axis                      36                              R Axis                      -8                              T Axis                      -6                              QRS Count                   9                               Q Onset                     214                             P Onset                     106                             P Offset                    157                             T Offset                    432                             QTC Fredericia              415                                                                                                            Narrative                          Sinus bradycardia with 1st degree AV block               Otherwise normal ECG                 No previous ECGs available                 Confirmed by Homer Nix ([id number]) on 2/2/2024 3:24:56 PM     Assessment and Plan:    Mr. Sylvester Carvalho is a 78 y.o. has severe coronary artery disease and is a good candidate for surgical revascularization.  Surgical procedure and risk factors has been discussed with the patient and he agreed to have the surgery performed and the date accepted it was February 22

## 2024-02-20 ENCOUNTER — LAB (OUTPATIENT)
Dept: LAB | Facility: HOSPITAL | Age: 79
DRG: 236 | End: 2024-02-20
Payer: MEDICARE

## 2024-02-20 DIAGNOSIS — R07.9 CHEST PAIN, UNSPECIFIED TYPE: ICD-10-CM

## 2024-02-20 DIAGNOSIS — R79.9 ABNORMAL FINDING OF BLOOD CHEMISTRY, UNSPECIFIED: ICD-10-CM

## 2024-02-20 DIAGNOSIS — I25.119 CORONARY ARTERY DISEASE INVOLVING NATIVE CORONARY ARTERY OF NATIVE HEART WITH ANGINA PECTORIS (CMS-HCC): ICD-10-CM

## 2024-02-20 DIAGNOSIS — R93.89 ABNORMAL FINDINGS ON DIAGNOSTIC IMAGING OF OTHER SPECIFIED BODY STRUCTURES: ICD-10-CM

## 2024-02-20 LAB
ABO GROUP (TYPE) IN BLOOD: NORMAL
ALBUMIN SERPL BCP-MCNC: 4.1 G/DL (ref 3.4–5)
ALP SERPL-CCNC: 60 U/L (ref 33–136)
ALT SERPL W P-5'-P-CCNC: 18 U/L (ref 10–52)
ANION GAP SERPL CALC-SCNC: 14 MMOL/L (ref 10–20)
ANTIBODY SCREEN: NORMAL
APPEARANCE UR: CLEAR
APTT PPP: 31 SECONDS (ref 27–38)
AST SERPL W P-5'-P-CCNC: 19 U/L (ref 9–39)
BASOPHILS # BLD AUTO: 0.05 X10*3/UL (ref 0–0.1)
BASOPHILS NFR BLD AUTO: 0.9 %
BILIRUB SERPL-MCNC: 0.9 MG/DL (ref 0–1.2)
BILIRUB UR STRIP.AUTO-MCNC: NEGATIVE MG/DL
BUN SERPL-MCNC: 14 MG/DL (ref 6–23)
CALCIUM SERPL-MCNC: 9.6 MG/DL (ref 8.6–10.3)
CHLORIDE SERPL-SCNC: 101 MMOL/L (ref 98–107)
CO2 SERPL-SCNC: 30 MMOL/L (ref 21–32)
COLOR UR: YELLOW
CREAT SERPL-MCNC: 0.89 MG/DL (ref 0.5–1.3)
EGFRCR SERPLBLD CKD-EPI 2021: 87 ML/MIN/1.73M*2
EOSINOPHIL # BLD AUTO: 0.16 X10*3/UL (ref 0–0.4)
EOSINOPHIL NFR BLD AUTO: 3 %
ERYTHROCYTE [DISTWIDTH] IN BLOOD BY AUTOMATED COUNT: 12.3 % (ref 11.5–14.5)
EST. AVERAGE GLUCOSE BLD GHB EST-MCNC: 114 MG/DL
GLUCOSE SERPL-MCNC: 101 MG/DL (ref 74–99)
GLUCOSE UR STRIP.AUTO-MCNC: NEGATIVE MG/DL
HBA1C MFR BLD: 5.6 %
HCT VFR BLD AUTO: 42.5 % (ref 41–52)
HGB BLD-MCNC: 14.8 G/DL (ref 13.5–17.5)
HYALINE CASTS #/AREA URNS AUTO: ABNORMAL /LPF
IMM GRANULOCYTES # BLD AUTO: 0.01 X10*3/UL (ref 0–0.5)
IMM GRANULOCYTES NFR BLD AUTO: 0.2 % (ref 0–0.9)
INR PPP: 1.1 (ref 0.9–1.1)
KETONES UR STRIP.AUTO-MCNC: NEGATIVE MG/DL
LEUKOCYTE ESTERASE UR QL STRIP.AUTO: NEGATIVE
LYMPHOCYTES # BLD AUTO: 1.17 X10*3/UL (ref 0.8–3)
LYMPHOCYTES NFR BLD AUTO: 22.1 %
MCH RBC QN AUTO: 31.9 PG (ref 26–34)
MCHC RBC AUTO-ENTMCNC: 34.8 G/DL (ref 32–36)
MCV RBC AUTO: 92 FL (ref 80–100)
MONOCYTES # BLD AUTO: 0.53 X10*3/UL (ref 0.05–0.8)
MONOCYTES NFR BLD AUTO: 10 %
MUCOUS THREADS #/AREA URNS AUTO: ABNORMAL /LPF
NEUTROPHILS # BLD AUTO: 3.37 X10*3/UL (ref 1.6–5.5)
NEUTROPHILS NFR BLD AUTO: 63.8 %
NITRITE UR QL STRIP.AUTO: NEGATIVE
NRBC BLD-RTO: 0 /100 WBCS (ref 0–0)
PH UR STRIP.AUTO: 6 [PH]
PLATELET # BLD AUTO: 162 X10*3/UL (ref 150–450)
POTASSIUM SERPL-SCNC: 3.5 MMOL/L (ref 3.5–5.3)
PROT SERPL-MCNC: 7 G/DL (ref 6.4–8.2)
PROT UR STRIP.AUTO-MCNC: ABNORMAL MG/DL
PROTHROMBIN TIME: 12.5 SECONDS (ref 9.8–12.8)
RBC # BLD AUTO: 4.64 X10*6/UL (ref 4.5–5.9)
RBC # UR STRIP.AUTO: ABNORMAL /UL
RBC #/AREA URNS AUTO: >20 /HPF
RH FACTOR (ANTIGEN D): NORMAL
SODIUM SERPL-SCNC: 141 MMOL/L (ref 136–145)
SP GR UR STRIP.AUTO: 1.02
TSH SERPL-ACNC: 3.19 MIU/L (ref 0.44–3.98)
UROBILINOGEN UR STRIP.AUTO-MCNC: <2 MG/DL
WBC # BLD AUTO: 5.3 X10*3/UL (ref 4.4–11.3)
WBC #/AREA URNS AUTO: ABNORMAL /HPF

## 2024-02-20 PROCEDURE — 84443 ASSAY THYROID STIM HORMONE: CPT

## 2024-02-20 PROCEDURE — 80053 COMPREHEN METABOLIC PANEL: CPT

## 2024-02-20 PROCEDURE — 85610 PROTHROMBIN TIME: CPT

## 2024-02-20 PROCEDURE — 36415 COLL VENOUS BLD VENIPUNCTURE: CPT

## 2024-02-20 PROCEDURE — 81001 URINALYSIS AUTO W/SCOPE: CPT

## 2024-02-20 PROCEDURE — 86901 BLOOD TYPING SEROLOGIC RH(D): CPT | Performed by: THORACIC SURGERY (CARDIOTHORACIC VASCULAR SURGERY)

## 2024-02-20 PROCEDURE — 85025 COMPLETE CBC W/AUTO DIFF WBC: CPT

## 2024-02-20 PROCEDURE — 83036 HEMOGLOBIN GLYCOSYLATED A1C: CPT | Mod: ELYLAB | Performed by: THORACIC SURGERY (CARDIOTHORACIC VASCULAR SURGERY)

## 2024-02-20 PROCEDURE — 86920 COMPATIBILITY TEST SPIN: CPT

## 2024-02-20 PROCEDURE — 85730 THROMBOPLASTIN TIME PARTIAL: CPT

## 2024-02-20 NOTE — PREPROCEDURE INSTRUCTIONS
PATIENT HAS CHG SKIN WIPES AND MUPIROCIN OINT.  - PATIENT HAS INSTRUCTIONS                  NPO Instructions: Do not eat any food after midnight the night before your surgery/procedure.    Additional Instructions:

## 2024-02-21 ENCOUNTER — ANESTHESIA EVENT (OUTPATIENT)
Dept: OPERATING ROOM | Facility: HOSPITAL | Age: 79
DRG: 236 | End: 2024-02-21
Payer: MEDICARE

## 2024-02-21 SDOH — HEALTH STABILITY: MENTAL HEALTH: CURRENT SMOKER: 0

## 2024-02-21 NOTE — ANESTHESIA PREPROCEDURE EVALUATION
Patient: Sylvester Carvalho    Procedure Information       Date/Time: 02/22/24 0730    Procedure: Creation Bypass Graft Coronary Artery    Location: [facility] / [facility]    Surgeons: Amarilys Chung MD            Relevant Problems   Cardiovascular   (+) Benign hypertension   (+) CAD (coronary artery disease)   (+) HLD (hyperlipidemia)      GI   (+) GERD (gastroesophageal reflux disease)      Musculoskeletal   (+) Osteoarthrosis       Clinical information reviewed:   Tobacco  Allergies  Meds   Med Hx  Surg Hx   Fam Hx  Soc Hx        NPO Detail:  No data recorded     Physical Exam    Airway  Mallampati: III  TM distance: >3 FB  Neck ROM: full  Comments: Limited oral opening   Cardiovascular    Dental - normal exam     Pulmonary    Abdominal        Anesthesia Plan    History of general anesthesia?: yes  History of complications of general anesthesia?: no    ASA 4     general   (A-line, CVP/PAC, intra-op LAUREN)  The patient is not a current smoker.    intravenous induction   Anesthetic plan and risks discussed with patient.    Plan discussed with CRNA.

## 2024-02-22 ENCOUNTER — APPOINTMENT (OUTPATIENT)
Dept: CARDIOLOGY | Facility: HOSPITAL | Age: 79
DRG: 236 | End: 2024-02-22
Payer: MEDICARE

## 2024-02-22 ENCOUNTER — APPOINTMENT (OUTPATIENT)
Dept: RADIOLOGY | Facility: HOSPITAL | Age: 79
DRG: 236 | End: 2024-02-22
Payer: MEDICARE

## 2024-02-22 ENCOUNTER — HOSPITAL ENCOUNTER (INPATIENT)
Dept: OPERATING ROOM | Facility: HOSPITAL | Age: 79
Discharge: HOME | DRG: 236 | End: 2024-02-22
Payer: MEDICARE

## 2024-02-22 ENCOUNTER — HOSPITAL ENCOUNTER (INPATIENT)
Facility: HOSPITAL | Age: 79
LOS: 6 days | Discharge: HOME | DRG: 236 | End: 2024-02-28
Attending: THORACIC SURGERY (CARDIOTHORACIC VASCULAR SURGERY) | Admitting: SURGERY
Payer: MEDICARE

## 2024-02-22 ENCOUNTER — ANESTHESIA (OUTPATIENT)
Dept: OPERATING ROOM | Facility: HOSPITAL | Age: 79
DRG: 236 | End: 2024-02-22
Payer: MEDICARE

## 2024-02-22 DIAGNOSIS — Z82.49 FAMILY HISTORY OF ISCHEMIC HEART DISEASE: ICD-10-CM

## 2024-02-22 DIAGNOSIS — H25.13 CATARACT, NUCLEAR SCLEROTIC, BOTH EYES: ICD-10-CM

## 2024-02-22 DIAGNOSIS — I10 BENIGN HYPERTENSION: ICD-10-CM

## 2024-02-22 DIAGNOSIS — G89.18 ACUTE POST-OPERATIVE PAIN: ICD-10-CM

## 2024-02-22 DIAGNOSIS — H44.2A3 BOTH EYES AFFECTED BY DEGENERATIVE MYOPIA WITH CHOROIDAL NEOVASCULARIZATION: ICD-10-CM

## 2024-02-22 DIAGNOSIS — R93.1 ELEVATED CORONARY ARTERY CALCIUM SCORE: ICD-10-CM

## 2024-02-22 DIAGNOSIS — R19.5 PENCILLING OF STOOLS: ICD-10-CM

## 2024-02-22 DIAGNOSIS — H35.053: Chronic | ICD-10-CM

## 2024-02-22 DIAGNOSIS — L91.8 INFLAMED SKIN TAG: ICD-10-CM

## 2024-02-22 DIAGNOSIS — M85.641 CYST OF BONE OF RIGHT HAND: ICD-10-CM

## 2024-02-22 DIAGNOSIS — I25.10 CORONARY ARTERY DISEASE INVOLVING NATIVE CORONARY ARTERY OF NATIVE HEART, UNSPECIFIED WHETHER ANGINA PRESENT: ICD-10-CM

## 2024-02-22 DIAGNOSIS — Z95.1 S/P CORONARY ARTERY BYPASS GRAFT X 3: Primary | ICD-10-CM

## 2024-02-22 DIAGNOSIS — H44.23: Chronic | ICD-10-CM

## 2024-02-22 DIAGNOSIS — K57.30 DIVERTICULOSIS OF COLON: ICD-10-CM

## 2024-02-22 DIAGNOSIS — H35.3231 EXUDATIVE AGE-RELATED MACULAR DEGENERATION OF BOTH EYES WITH ACTIVE CHOROIDAL NEOVASCULARIZATION (MULTI): ICD-10-CM

## 2024-02-22 DIAGNOSIS — D22.9 ATYPICAL NEVUS: ICD-10-CM

## 2024-02-22 DIAGNOSIS — Z87.891 FORMER CIGARETTE SMOKER: ICD-10-CM

## 2024-02-22 DIAGNOSIS — E78.2 MIXED HYPERLIPIDEMIA: ICD-10-CM

## 2024-02-22 DIAGNOSIS — Z12.11 ENCOUNTER FOR SCREENING FOR MALIGNANT NEOPLASM OF COLON: ICD-10-CM

## 2024-02-22 DIAGNOSIS — Z85.89 HISTORY OF SQUAMOUS CELL CARCINOMA: ICD-10-CM

## 2024-02-22 DIAGNOSIS — E55.9 VITAMIN D DEFICIENCY: ICD-10-CM

## 2024-02-22 DIAGNOSIS — D62 ANEMIA DUE TO BLOOD LOSS, ACUTE: ICD-10-CM

## 2024-02-22 DIAGNOSIS — I48.0 PAROXYSMAL ATRIAL FIBRILLATION (MULTI): ICD-10-CM

## 2024-02-22 DIAGNOSIS — E80.6 HYPERBILIRUBINEMIA: ICD-10-CM

## 2024-02-22 LAB
ABO GROUP (TYPE) IN BLOOD: NORMAL
ALBUMIN SERPL BCP-MCNC: 3.5 G/DL (ref 3.4–5)
ANION GAP BLDA CALCULATED.4IONS-SCNC: 10 MMO/L (ref 10–25)
ANION GAP BLDA CALCULATED.4IONS-SCNC: 7 MMO/L (ref 10–25)
ANION GAP BLDA CALCULATED.4IONS-SCNC: 8 MMO/L (ref 10–25)
ANION GAP BLDA CALCULATED.4IONS-SCNC: 9 MMO/L (ref 10–25)
ANION GAP BLDA CALCULATED.4IONS-SCNC: ABNORMAL MMOL/L
ANION GAP BLDMV CALCULATED.4IONS-SCNC: 7 MMO/L (ref 10–25)
ANION GAP BLDV CALCULATED.4IONS-SCNC: 9 MMOL/L (ref 10–25)
ANION GAP SERPL CALC-SCNC: 11 MMOL/L (ref 10–20)
APTT PPP: 29 SECONDS (ref 27–38)
BASE EXCESS BLDA CALC-SCNC: -0.3 MMOL/L (ref -2–3)
BASE EXCESS BLDA CALC-SCNC: -0.5 MMOL/L (ref -2–3)
BASE EXCESS BLDA CALC-SCNC: 0.5 MMOL/L (ref -2–3)
BASE EXCESS BLDA CALC-SCNC: 0.7 MMOL/L (ref -2–3)
BASE EXCESS BLDA CALC-SCNC: 1.2 MMOL/L (ref -2–3)
BASE EXCESS BLDA CALC-SCNC: 1.5 MMOL/L (ref -2–3)
BASE EXCESS BLDA CALC-SCNC: 5.6 MMOL/L (ref -2–3)
BASE EXCESS BLDMV CALC-SCNC: 4.5 MMOL/L (ref -2–3)
BASE EXCESS BLDV CALC-SCNC: 0.7 MMOL/L (ref -2–3)
BLOOD EXPIRATION DATE: NORMAL
BODY TEMPERATURE: ABNORMAL
BUN SERPL-MCNC: 13 MG/DL (ref 6–23)
CA-I BLDA-SCNC: 1.14 MMOL/L (ref 1.1–1.33)
CA-I BLDA-SCNC: 1.17 MMOL/L (ref 1.1–1.33)
CA-I BLDA-SCNC: 1.17 MMOL/L (ref 1.1–1.33)
CA-I BLDA-SCNC: 1.2 MMOL/L (ref 1.1–1.33)
CA-I BLDA-SCNC: 1.2 MMOL/L (ref 1.1–1.33)
CA-I BLDA-SCNC: 1.24 MMOL/L (ref 1.1–1.33)
CA-I BLDA-SCNC: 1.26 MMOL/L (ref 1.1–1.33)
CA-I BLDMV-SCNC: 1.28 MMOL/L (ref 1.1–1.33)
CA-I BLDV-SCNC: 1.19 MMOL/L (ref 1.1–1.33)
CALCIUM SERPL-MCNC: 8.2 MG/DL (ref 8.6–10.3)
CHLORIDE BLD-SCNC: 103 MMOL/L (ref 98–107)
CHLORIDE BLDA-SCNC: 104 MMOL/L (ref 98–107)
CHLORIDE BLDA-SCNC: 105 MMOL/L (ref 98–107)
CHLORIDE BLDA-SCNC: ABNORMAL MMOL/L
CHLORIDE BLDV-SCNC: 104 MMOL/L (ref 98–107)
CHLORIDE SERPL-SCNC: 109 MMOL/L (ref 98–107)
CO2 SERPL-SCNC: 24 MMOL/L (ref 21–32)
CREAT SERPL-MCNC: 0.87 MG/DL (ref 0.5–1.3)
DISPENSE STATUS: NORMAL
EGFRCR SERPLBLD CKD-EPI 2021: 88 ML/MIN/1.73M*2
ERYTHROCYTE [DISTWIDTH] IN BLOOD BY AUTOMATED COUNT: 12.1 % (ref 11.5–14.5)
FIBRINOGEN PPP-MCNC: 166 MG/DL (ref 200–400)
GLUCOSE BLD MANUAL STRIP-MCNC: 135 MG/DL (ref 74–99)
GLUCOSE BLD MANUAL STRIP-MCNC: 163 MG/DL (ref 74–99)
GLUCOSE BLD MANUAL STRIP-MCNC: 166 MG/DL (ref 74–99)
GLUCOSE BLD MANUAL STRIP-MCNC: 173 MG/DL (ref 74–99)
GLUCOSE BLD-MCNC: 127 MG/DL (ref 74–99)
GLUCOSE BLDA-MCNC: 109 MG/DL (ref 74–99)
GLUCOSE BLDA-MCNC: 146 MG/DL (ref 74–99)
GLUCOSE BLDA-MCNC: 153 MG/DL (ref 74–99)
GLUCOSE BLDA-MCNC: 158 MG/DL (ref 74–99)
GLUCOSE BLDA-MCNC: 163 MG/DL (ref 74–99)
GLUCOSE BLDA-MCNC: 167 MG/DL (ref 74–99)
GLUCOSE BLDA-MCNC: 171 MG/DL (ref 74–99)
GLUCOSE BLDV-MCNC: 153 MG/DL (ref 74–99)
GLUCOSE SERPL-MCNC: 139 MG/DL (ref 74–99)
HCO3 BLDA-SCNC: 24.9 MMOL/L (ref 22–26)
HCO3 BLDA-SCNC: 25.4 MMOL/L (ref 22–26)
HCO3 BLDA-SCNC: 27.1 MMOL/L (ref 22–26)
HCO3 BLDA-SCNC: 28.5 MMOL/L (ref 22–26)
HCO3 BLDA-SCNC: 29.8 MMOL/L (ref 22–26)
HCO3 BLDMV-SCNC: 30.4 MMOL/L (ref 22–26)
HCO3 BLDV-SCNC: 27.7 MMOL/L (ref 22–26)
HCT VFR BLD AUTO: 35.3 % (ref 41–52)
HCT VFR BLD EST: 31 % (ref 41–52)
HCT VFR BLD EST: 32 % (ref 41–52)
HCT VFR BLD EST: 33 % (ref 41–52)
HCT VFR BLD EST: 37 % (ref 41–52)
HCT VFR BLD EST: 39 % (ref 41–52)
HCT VFR BLD EST: 41 % (ref 41–52)
HCT VFR BLD EST: 42 % (ref 41–52)
HGB BLD-MCNC: 12.2 G/DL (ref 13.5–17.5)
HGB BLDA-MCNC: 10.3 G/DL (ref 13.5–17.5)
HGB BLDA-MCNC: 10.7 G/DL (ref 13.5–17.5)
HGB BLDA-MCNC: 10.9 G/DL (ref 13.5–17.5)
HGB BLDA-MCNC: 11 G/DL (ref 13.5–17.5)
HGB BLDA-MCNC: 12.2 G/DL (ref 13.5–17.5)
HGB BLDA-MCNC: 13 G/DL (ref 13.5–17.5)
HGB BLDA-MCNC: 14.1 G/DL (ref 13.5–17.5)
HGB BLDMV-MCNC: 13.6 G/DL (ref 13.5–17.5)
HGB BLDV-MCNC: 10.9 G/DL (ref 13.5–17.5)
INHALED O2 CONCENTRATION: 100 %
INHALED O2 CONCENTRATION: 21 %
INHALED O2 CONCENTRATION: 50 %
INHALED O2 CONCENTRATION: 70 %
INHALED O2 CONCENTRATION: 75 %
INHALED O2 CONCENTRATION: 80 %
INHALED O2 CONCENTRATION: 80 %
INR PPP: 1.3 (ref 0.9–1.1)
LACTATE BLDA-SCNC: 1 MMOL/L (ref 0.4–2)
LACTATE BLDA-SCNC: 1.3 MMOL/L (ref 0.4–2)
LACTATE BLDA-SCNC: 1.4 MMOL/L (ref 0.4–2)
LACTATE BLDA-SCNC: 1.4 MMOL/L (ref 0.4–2)
LACTATE BLDA-SCNC: 1.5 MMOL/L (ref 0.4–2)
LACTATE BLDA-SCNC: 1.5 MMOL/L (ref 0.4–2)
LACTATE BLDA-SCNC: 1.6 MMOL/L (ref 0.4–2)
LACTATE BLDMV-SCNC: 1.1 MMOL/L (ref 0.4–2)
LACTATE BLDV-SCNC: 1.4 MMOL/L (ref 0.4–2)
MAGNESIUM SERPL-MCNC: 2.76 MG/DL (ref 1.6–2.4)
MCH RBC QN AUTO: 31.9 PG (ref 26–34)
MCHC RBC AUTO-ENTMCNC: 34.6 G/DL (ref 32–36)
MCV RBC AUTO: 92 FL (ref 80–100)
NRBC BLD-RTO: 0 /100 WBCS (ref 0–0)
OXYHGB MFR BLDA: 95.1 % (ref 94–98)
OXYHGB MFR BLDA: 97 % (ref 94–98)
OXYHGB MFR BLDA: 97.9 % (ref 94–98)
OXYHGB MFR BLDA: 97.9 % (ref 94–98)
OXYHGB MFR BLDA: 98 % (ref 94–98)
OXYHGB MFR BLDA: 98.1 % (ref 94–98)
OXYHGB MFR BLDA: 98.1 % (ref 94–98)
OXYHGB MFR BLDMV: 82.8 % (ref 45–75)
OXYHGB MFR BLDV: 86.4 % (ref 45–75)
PCO2 BLDA: 40 MM HG (ref 38–42)
PCO2 BLDA: 41 MM HG (ref 38–42)
PCO2 BLDA: 41 MM HG (ref 38–42)
PCO2 BLDA: 43 MM HG (ref 38–42)
PCO2 BLDA: 45 MM HG (ref 38–42)
PCO2 BLDA: 48 MM HG (ref 38–42)
PCO2 BLDA: 54 MM HG (ref 38–42)
PCO2 BLDMV: 49 MM HG (ref 41–51)
PCO2 BLDV: 55 MM HG (ref 41–51)
PH BLDA: 7.33 PH (ref 7.38–7.42)
PH BLDA: 7.36 PH (ref 7.38–7.42)
PH BLDA: 7.36 PH (ref 7.38–7.42)
PH BLDA: 7.37 PH (ref 7.38–7.42)
PH BLDA: 7.4 PH (ref 7.38–7.42)
PH BLDA: 7.41 PH (ref 7.38–7.42)
PH BLDA: 7.47 PH (ref 7.38–7.42)
PH BLDMV: 7.4 PH (ref 7.33–7.43)
PH BLDV: 7.31 PH (ref 7.33–7.43)
PHOSPHATE SERPL-MCNC: 3.2 MG/DL (ref 2.5–4.9)
PLATELET # BLD AUTO: 148 X10*3/UL (ref 150–450)
PO2 BLDA: 114 MM HG (ref 85–95)
PO2 BLDA: 189 MM HG (ref 85–95)
PO2 BLDA: 274 MM HG (ref 85–95)
PO2 BLDA: 305 MM HG (ref 85–95)
PO2 BLDA: 385 MM HG (ref 85–95)
PO2 BLDA: 413 MM HG (ref 85–95)
PO2 BLDA: 79 MM HG (ref 85–95)
PO2 BLDMV: 54 MM HG (ref 35–45)
PO2 BLDV: 56 MM HG (ref 35–45)
POTASSIUM BLDA-SCNC: 3.8 MMOL/L (ref 3.5–5.3)
POTASSIUM BLDA-SCNC: 3.8 MMOL/L (ref 3.5–5.3)
POTASSIUM BLDA-SCNC: 4.2 MMOL/L (ref 3.5–5.3)
POTASSIUM BLDA-SCNC: 4.6 MMOL/L (ref 3.5–5.3)
POTASSIUM BLDA-SCNC: 4.9 MMOL/L (ref 3.5–5.3)
POTASSIUM BLDA-SCNC: 4.9 MMOL/L (ref 3.5–5.3)
POTASSIUM BLDA-SCNC: 5 MMOL/L (ref 3.5–5.3)
POTASSIUM BLDMV-SCNC: 3.6 MMOL/L (ref 3.5–5.3)
POTASSIUM BLDV-SCNC: 4.6 MMOL/L (ref 3.5–5.3)
POTASSIUM SERPL-SCNC: 4 MMOL/L (ref 3.5–5.3)
PRODUCT BLOOD TYPE: 7300
PRODUCT CODE: NORMAL
PROTHROMBIN TIME: 14.8 SECONDS (ref 9.8–12.8)
RBC # BLD AUTO: 3.82 X10*6/UL (ref 4.5–5.9)
RH FACTOR (ANTIGEN D): NORMAL
SAO2 % BLDA: 100 % (ref 94–100)
SAO2 % BLDA: 100 % (ref 94–100)
SAO2 % BLDA: 97 % (ref 94–100)
SAO2 % BLDA: 98 % (ref 94–100)
SAO2 % BLDA: 99 % (ref 94–100)
SAO2 % BLDMV: 85 % (ref 45–75)
SAO2 % BLDV: 88 % (ref 45–75)
SODIUM BLDA-SCNC: 134 MMOL/L (ref 136–145)
SODIUM BLDA-SCNC: 135 MMOL/L (ref 136–145)
SODIUM BLDA-SCNC: 136 MMOL/L (ref 136–145)
SODIUM BLDA-SCNC: 138 MMOL/L (ref 136–145)
SODIUM BLDA-SCNC: 139 MMOL/L (ref 136–145)
SODIUM BLDMV-SCNC: 137 MMOL/L (ref 136–145)
SODIUM BLDV-SCNC: 136 MMOL/L (ref 136–145)
SODIUM SERPL-SCNC: 140 MMOL/L (ref 136–145)
UNIT ABO: NORMAL
UNIT NUMBER: NORMAL
UNIT RH: NORMAL
UNIT VOLUME: 284
WBC # BLD AUTO: 15.5 X10*3/UL (ref 4.4–11.3)

## 2024-02-22 PROCEDURE — 33508 ENDOSCOPIC VEIN HARVEST: CPT | Performed by: THORACIC SURGERY (CARDIOTHORACIC VASCULAR SURGERY)

## 2024-02-22 PROCEDURE — 94640 AIRWAY INHALATION TREATMENT: CPT | Mod: MUE

## 2024-02-22 PROCEDURE — 93010 ELECTROCARDIOGRAM REPORT: CPT | Performed by: INTERNAL MEDICINE

## 2024-02-22 PROCEDURE — 2500000001 HC RX 250 WO HCPCS SELF ADMINISTERED DRUGS (ALT 637 FOR MEDICARE OP)

## 2024-02-22 PROCEDURE — 2500000002 HC RX 250 W HCPCS SELF ADMINISTERED DRUGS (ALT 637 FOR MEDICARE OP, ALT 636 FOR OP/ED): Mod: MUE

## 2024-02-22 PROCEDURE — 37799 UNLISTED PX VASCULAR SURGERY: CPT

## 2024-02-22 PROCEDURE — 84132 ASSAY OF SERUM POTASSIUM: CPT

## 2024-02-22 PROCEDURE — 2500000004 HC RX 250 GENERAL PHARMACY W/ HCPCS (ALT 636 FOR OP/ED): Performed by: THORACIC SURGERY (CARDIOTHORACIC VASCULAR SURGERY)

## 2024-02-22 PROCEDURE — 2500000005 HC RX 250 GENERAL PHARMACY W/O HCPCS

## 2024-02-22 PROCEDURE — 2500000004 HC RX 250 GENERAL PHARMACY W/ HCPCS (ALT 636 FOR OP/ED): Mod: JZ

## 2024-02-22 PROCEDURE — 36430 TRANSFUSION BLD/BLD COMPNT: CPT | Performed by: NURSE ANESTHETIST, CERTIFIED REGISTERED

## 2024-02-22 PROCEDURE — 82947 ASSAY GLUCOSE BLOOD QUANT: CPT

## 2024-02-22 PROCEDURE — P9035 PLATELET PHERES LEUKOREDUCED: HCPCS

## 2024-02-22 PROCEDURE — 71045 X-RAY EXAM CHEST 1 VIEW: CPT

## 2024-02-22 PROCEDURE — 3600000018 HC OR TIME - INITIAL BASE CHARGE - PROCEDURE LEVEL SIX: Performed by: THORACIC SURGERY (CARDIOTHORACIC VASCULAR SURGERY)

## 2024-02-22 PROCEDURE — 3600000012 HC PERFUSION TIME - EACH INCREMENTAL 1 MINUTE: Performed by: THORACIC SURGERY (CARDIOTHORACIC VASCULAR SURGERY)

## 2024-02-22 PROCEDURE — 3700000002 HC GENERAL ANESTHESIA TIME - EACH INCREMENTAL 1 MINUTE: Performed by: THORACIC SURGERY (CARDIOTHORACIC VASCULAR SURGERY)

## 2024-02-22 PROCEDURE — 94681 O2 UPTK CO2 OUTP % O2 XTRC: CPT

## 2024-02-22 PROCEDURE — 3600000011 HC PERFUSION TIME - INITIAL BASE CHARGE: Performed by: THORACIC SURGERY (CARDIOTHORACIC VASCULAR SURGERY)

## 2024-02-22 PROCEDURE — 33533 CABG ARTERIAL SINGLE: CPT | Performed by: THORACIC SURGERY (CARDIOTHORACIC VASCULAR SURGERY)

## 2024-02-22 PROCEDURE — 93005 ELECTROCARDIOGRAM TRACING: CPT

## 2024-02-22 PROCEDURE — 36415 COLL VENOUS BLD VENIPUNCTURE: CPT | Performed by: THORACIC SURGERY (CARDIOTHORACIC VASCULAR SURGERY)

## 2024-02-22 PROCEDURE — S0017 INJECTION, AMINOCAPROIC ACID: HCPCS | Performed by: NURSE ANESTHETIST, CERTIFIED REGISTERED

## 2024-02-22 PROCEDURE — 2500000005 HC RX 250 GENERAL PHARMACY W/O HCPCS: Performed by: THORACIC SURGERY (CARDIOTHORACIC VASCULAR SURGERY)

## 2024-02-22 PROCEDURE — 3700000001 HC GENERAL ANESTHESIA TIME - INITIAL BASE CHARGE: Performed by: THORACIC SURGERY (CARDIOTHORACIC VASCULAR SURGERY)

## 2024-02-22 PROCEDURE — 94002 VENT MGMT INPAT INIT DAY: CPT

## 2024-02-22 PROCEDURE — 99291 CRITICAL CARE FIRST HOUR: CPT

## 2024-02-22 PROCEDURE — 2500000004 HC RX 250 GENERAL PHARMACY W/ HCPCS (ALT 636 FOR OP/ED)

## 2024-02-22 PROCEDURE — 71045 X-RAY EXAM CHEST 1 VIEW: CPT | Performed by: RADIOLOGY

## 2024-02-22 PROCEDURE — 2500000004 HC RX 250 GENERAL PHARMACY W/ HCPCS (ALT 636 FOR OP/ED): Performed by: NURSE PRACTITIONER

## 2024-02-22 PROCEDURE — 2780000003 HC OR 278 NO HCPCS: Performed by: THORACIC SURGERY (CARDIOTHORACIC VASCULAR SURGERY)

## 2024-02-22 PROCEDURE — 85027 COMPLETE CBC AUTOMATED: CPT

## 2024-02-22 PROCEDURE — 06BQ4ZZ EXCISION OF LEFT SAPHENOUS VEIN, PERCUTANEOUS ENDOSCOPIC APPROACH: ICD-10-PCS | Performed by: THORACIC SURGERY (CARDIOTHORACIC VASCULAR SURGERY)

## 2024-02-22 PROCEDURE — 85347 COAGULATION TIME ACTIVATED: CPT

## 2024-02-22 PROCEDURE — 2500000004 HC RX 250 GENERAL PHARMACY W/ HCPCS (ALT 636 FOR OP/ED): Performed by: ANESTHESIOLOGY

## 2024-02-22 PROCEDURE — P9045 ALBUMIN (HUMAN), 5%, 250 ML: HCPCS | Mod: JZ

## 2024-02-22 PROCEDURE — 02100A9 BYPASS CORONARY ARTERY, ONE ARTERY FROM LEFT INTERNAL MAMMARY WITH AUTOLOGOUS ARTERIAL TISSUE, OPEN APPROACH: ICD-10-PCS | Performed by: THORACIC SURGERY (CARDIOTHORACIC VASCULAR SURGERY)

## 2024-02-22 PROCEDURE — 2020000001 HC ICU ROOM DAILY

## 2024-02-22 PROCEDURE — A4217 STERILE WATER/SALINE, 500 ML: HCPCS | Mod: MUE | Performed by: THORACIC SURGERY (CARDIOTHORACIC VASCULAR SURGERY)

## 2024-02-22 PROCEDURE — 2720000007 HC OR 272 NO HCPCS: Performed by: THORACIC SURGERY (CARDIOTHORACIC VASCULAR SURGERY)

## 2024-02-22 PROCEDURE — C1900 LEAD, CORONARY VENOUS: HCPCS | Performed by: THORACIC SURGERY (CARDIOTHORACIC VASCULAR SURGERY)

## 2024-02-22 PROCEDURE — 2500000002 HC RX 250 W HCPCS SELF ADMINISTERED DRUGS (ALT 637 FOR MEDICARE OP, ALT 636 FOR OP/ED): Mod: MUE | Performed by: NURSE PRACTITIONER

## 2024-02-22 PROCEDURE — 85610 PROTHROMBIN TIME: CPT

## 2024-02-22 PROCEDURE — 83735 ASSAY OF MAGNESIUM: CPT

## 2024-02-22 PROCEDURE — A4649 SURGICAL SUPPLIES: HCPCS | Performed by: THORACIC SURGERY (CARDIOTHORACIC VASCULAR SURGERY)

## 2024-02-22 PROCEDURE — 0211093 BYPASS CORONARY ARTERY, TWO ARTERIES FROM CORONARY ARTERY WITH AUTOLOGOUS VENOUS TISSUE, OPEN APPROACH: ICD-10-PCS | Performed by: THORACIC SURGERY (CARDIOTHORACIC VASCULAR SURGERY)

## 2024-02-22 PROCEDURE — 85384 FIBRINOGEN ACTIVITY: CPT

## 2024-02-22 PROCEDURE — 33518 CABG ARTERY-VEIN TWO: CPT | Performed by: THORACIC SURGERY (CARDIOTHORACIC VASCULAR SURGERY)

## 2024-02-22 PROCEDURE — 2500000005 HC RX 250 GENERAL PHARMACY W/O HCPCS: Performed by: NURSE ANESTHETIST, CERTIFIED REGISTERED

## 2024-02-22 PROCEDURE — 5A1221Z PERFORMANCE OF CARDIAC OUTPUT, CONTINUOUS: ICD-10-PCS | Performed by: THORACIC SURGERY (CARDIOTHORACIC VASCULAR SURGERY)

## 2024-02-22 PROCEDURE — 3600000017 HC OR TIME - EACH INCREMENTAL 1 MINUTE - PROCEDURE LEVEL SIX: Performed by: THORACIC SURGERY (CARDIOTHORACIC VASCULAR SURGERY)

## 2024-02-22 PROCEDURE — 2500000004 HC RX 250 GENERAL PHARMACY W/ HCPCS (ALT 636 FOR OP/ED): Performed by: NURSE ANESTHETIST, CERTIFIED REGISTERED

## 2024-02-22 DEVICE — ADAPTER 13003 CARDIOPLEGIA MGMT SET 17L
Type: IMPLANTABLE DEVICE | Site: HEART | Status: FUNCTIONAL
Brand: DLP®

## 2024-02-22 RX ORDER — PROPOFOL 10 MG/ML
INJECTION, EMULSION INTRAVENOUS AS NEEDED
Status: DISCONTINUED | OUTPATIENT
Start: 2024-02-22 | End: 2024-02-22

## 2024-02-22 RX ORDER — POLYETHYLENE GLYCOL 3350 17 G/17G
17 POWDER, FOR SOLUTION ORAL 2 TIMES DAILY
Status: DISCONTINUED | OUTPATIENT
Start: 2024-02-22 | End: 2024-02-28 | Stop reason: HOSPADM

## 2024-02-22 RX ORDER — AMLODIPINE BESYLATE 5 MG/1
2.5 TABLET ORAL DAILY
Status: DISCONTINUED | OUTPATIENT
Start: 2024-02-22 | End: 2024-02-28 | Stop reason: HOSPADM

## 2024-02-22 RX ORDER — ASPIRIN 300 MG/1
150 SUPPOSITORY RECTAL ONCE
Status: COMPLETED | OUTPATIENT
Start: 2024-02-22 | End: 2024-02-22

## 2024-02-22 RX ORDER — NOREPINEPHRINE BITARTRATE 0.03 MG/ML
INJECTION, SOLUTION INTRAVENOUS CONTINUOUS PRN
Status: DISCONTINUED | OUTPATIENT
Start: 2024-02-22 | End: 2024-02-22

## 2024-02-22 RX ORDER — IPRATROPIUM BROMIDE AND ALBUTEROL SULFATE 2.5; .5 MG/3ML; MG/3ML
3 SOLUTION RESPIRATORY (INHALATION)
Status: DISCONTINUED | OUTPATIENT
Start: 2024-02-22 | End: 2024-02-25

## 2024-02-22 RX ORDER — CALCIUM GLUCONATE 20 MG/ML
1 INJECTION, SOLUTION INTRAVENOUS EVERY 6 HOURS PRN
Status: DISCONTINUED | OUTPATIENT
Start: 2024-02-22 | End: 2024-02-25

## 2024-02-22 RX ORDER — NAPROXEN SODIUM 220 MG/1
81 TABLET, FILM COATED ORAL DAILY
Status: DISCONTINUED | OUTPATIENT
Start: 2024-02-23 | End: 2024-02-28 | Stop reason: HOSPADM

## 2024-02-22 RX ORDER — CALCIUM GLUCONATE 20 MG/ML
2 INJECTION, SOLUTION INTRAVENOUS EVERY 6 HOURS PRN
Status: DISCONTINUED | OUTPATIENT
Start: 2024-02-22 | End: 2024-02-25

## 2024-02-22 RX ORDER — POTASSIUM CHLORIDE 29.8 MG/ML
40 INJECTION INTRAVENOUS EVERY 6 HOURS PRN
Status: DISCONTINUED | OUTPATIENT
Start: 2024-02-22 | End: 2024-02-22

## 2024-02-22 RX ORDER — NAPROXEN SODIUM 220 MG/1
81 TABLET, FILM COATED ORAL DAILY
Status: DISCONTINUED | OUTPATIENT
Start: 2024-02-23 | End: 2024-02-22

## 2024-02-22 RX ORDER — DOCUSATE SODIUM 100 MG/1
100 CAPSULE, LIQUID FILLED ORAL 2 TIMES DAILY
Status: DISCONTINUED | OUTPATIENT
Start: 2024-02-22 | End: 2024-02-22

## 2024-02-22 RX ORDER — PANTOPRAZOLE SODIUM 40 MG/1
40 TABLET, DELAYED RELEASE ORAL
Status: DISCONTINUED | OUTPATIENT
Start: 2024-02-23 | End: 2024-02-28 | Stop reason: HOSPADM

## 2024-02-22 RX ORDER — MAGNESIUM SULFATE HEPTAHYDRATE 40 MG/ML
4 INJECTION, SOLUTION INTRAVENOUS EVERY 6 HOURS PRN
Status: DISCONTINUED | OUTPATIENT
Start: 2024-02-22 | End: 2024-02-28 | Stop reason: HOSPADM

## 2024-02-22 RX ORDER — NAPROXEN SODIUM 220 MG/1
81 TABLET, FILM COATED ORAL DAILY
Status: DISCONTINUED | OUTPATIENT
Start: 2024-02-22 | End: 2024-02-22

## 2024-02-22 RX ORDER — NAPROXEN SODIUM 220 MG/1
81 TABLET, FILM COATED ORAL ONCE
Status: COMPLETED | OUTPATIENT
Start: 2024-02-22 | End: 2024-02-22

## 2024-02-22 RX ORDER — ASPIRIN 300 MG/1
150 SUPPOSITORY RECTAL DAILY
Status: DISCONTINUED | OUTPATIENT
Start: 2024-02-23 | End: 2024-02-22

## 2024-02-22 RX ORDER — POTASSIUM CHLORIDE 20 MEQ/1
20 TABLET, EXTENDED RELEASE ORAL EVERY 6 HOURS PRN
Status: DISCONTINUED | OUTPATIENT
Start: 2024-02-22 | End: 2024-02-28 | Stop reason: HOSPADM

## 2024-02-22 RX ORDER — PANTOPRAZOLE SODIUM 40 MG/10ML
40 INJECTION, POWDER, LYOPHILIZED, FOR SOLUTION INTRAVENOUS
Status: DISCONTINUED | OUTPATIENT
Start: 2024-02-23 | End: 2024-02-23

## 2024-02-22 RX ORDER — PHENYLEPHRINE HYDROCHLORIDE 10 MG/ML
INJECTION INTRAVENOUS AS NEEDED
Status: DISCONTINUED | OUTPATIENT
Start: 2024-02-22 | End: 2024-02-22

## 2024-02-22 RX ORDER — ALBUMIN HUMAN 50 G/1000ML
25 SOLUTION INTRAVENOUS ONCE
Status: COMPLETED | OUTPATIENT
Start: 2024-02-22 | End: 2024-02-22

## 2024-02-22 RX ORDER — ACETAMINOPHEN 325 MG/1
650 TABLET ORAL EVERY 4 HOURS
Status: DISCONTINUED | OUTPATIENT
Start: 2024-02-22 | End: 2024-02-25

## 2024-02-22 RX ORDER — NICARDIPINE HYDROCHLORIDE 0.2 MG/ML
2.5-15 INJECTION INTRAVENOUS CONTINUOUS
Status: DISCONTINUED | OUTPATIENT
Start: 2024-02-22 | End: 2024-02-22

## 2024-02-22 RX ORDER — VANCOMYCIN HYDROCHLORIDE 1 G/20ML
INJECTION, POWDER, LYOPHILIZED, FOR SOLUTION INTRAVENOUS AS NEEDED
Status: DISCONTINUED | OUTPATIENT
Start: 2024-02-22 | End: 2024-02-22 | Stop reason: HOSPADM

## 2024-02-22 RX ORDER — OXYCODONE HYDROCHLORIDE 5 MG/1
5 TABLET ORAL EVERY 4 HOURS PRN
Status: DISCONTINUED | OUTPATIENT
Start: 2024-02-22 | End: 2024-02-26

## 2024-02-22 RX ORDER — NOREPINEPHRINE BITARTRATE/D5W 8 MG/250ML
0-1 PLASTIC BAG, INJECTION (ML) INTRAVENOUS CONTINUOUS
Status: DISCONTINUED | OUTPATIENT
Start: 2024-02-22 | End: 2024-02-23

## 2024-02-22 RX ORDER — SODIUM CHLORIDE 0.9 G/100ML
IRRIGANT IRRIGATION AS NEEDED
Status: DISCONTINUED | OUTPATIENT
Start: 2024-02-22 | End: 2024-02-22 | Stop reason: HOSPADM

## 2024-02-22 RX ORDER — NALOXONE HYDROCHLORIDE 1 MG/ML
0.2 INJECTION INTRAMUSCULAR; INTRAVENOUS; SUBCUTANEOUS EVERY 5 MIN PRN
Status: DISCONTINUED | OUTPATIENT
Start: 2024-02-22 | End: 2024-02-28 | Stop reason: HOSPADM

## 2024-02-22 RX ORDER — PHENYLEPHRINE 10 MG/250 ML(40 MCG/ML)IN 0.9 % SOD.CHLORIDE INTRAVENOUS
CONTINUOUS PRN
Status: DISCONTINUED | OUTPATIENT
Start: 2024-02-22 | End: 2024-02-22

## 2024-02-22 RX ORDER — INSULIN LISPRO 100 [IU]/ML
0-15 INJECTION, SOLUTION INTRAVENOUS; SUBCUTANEOUS EVERY 4 HOURS
Status: DISCONTINUED | OUTPATIENT
Start: 2024-02-22 | End: 2024-02-23

## 2024-02-22 RX ORDER — SODIUM CHLORIDE, SODIUM LACTATE, POTASSIUM CHLORIDE, CALCIUM CHLORIDE 600; 310; 30; 20 MG/100ML; MG/100ML; MG/100ML; MG/100ML
50 INJECTION, SOLUTION INTRAVENOUS CONTINUOUS
Status: DISCONTINUED | OUTPATIENT
Start: 2024-02-22 | End: 2024-02-23

## 2024-02-22 RX ORDER — PAPAVERINE HYDROCHLORIDE 30 MG/ML
INJECTION INTRAMUSCULAR; INTRAVENOUS AS NEEDED
Status: DISCONTINUED | OUTPATIENT
Start: 2024-02-22 | End: 2024-02-22 | Stop reason: HOSPADM

## 2024-02-22 RX ORDER — MIDAZOLAM HYDROCHLORIDE 1 MG/ML
INJECTION, SOLUTION INTRAMUSCULAR; INTRAVENOUS AS NEEDED
Status: DISCONTINUED | OUTPATIENT
Start: 2024-02-22 | End: 2024-02-22

## 2024-02-22 RX ORDER — ALBUMIN HUMAN 50 G/1000ML
25 SOLUTION INTRAVENOUS ONCE
Status: DISCONTINUED | OUTPATIENT
Start: 2024-02-22 | End: 2024-02-22

## 2024-02-22 RX ORDER — ROCURONIUM BROMIDE 10 MG/ML
INJECTION, SOLUTION INTRAVENOUS AS NEEDED
Status: DISCONTINUED | OUTPATIENT
Start: 2024-02-22 | End: 2024-02-22

## 2024-02-22 RX ORDER — PROTAMINE SULFATE 10 MG/ML
INJECTION, SOLUTION INTRAVENOUS AS NEEDED
Status: DISCONTINUED | OUTPATIENT
Start: 2024-02-22 | End: 2024-02-22

## 2024-02-22 RX ORDER — DOCUSATE SODIUM 100 MG/1
100 CAPSULE, LIQUID FILLED ORAL 3 TIMES DAILY
Status: DISCONTINUED | OUTPATIENT
Start: 2024-02-23 | End: 2024-02-28 | Stop reason: HOSPADM

## 2024-02-22 RX ORDER — LIDOCAINE HYDROCHLORIDE 10 MG/ML
INJECTION INFILTRATION; PERINEURAL AS NEEDED
Status: DISCONTINUED | OUTPATIENT
Start: 2024-02-22 | End: 2024-02-22

## 2024-02-22 RX ORDER — POTASSIUM CHLORIDE 14.9 MG/ML
20 INJECTION INTRAVENOUS EVERY 6 HOURS PRN
Status: DISCONTINUED | OUTPATIENT
Start: 2024-02-22 | End: 2024-02-25

## 2024-02-22 RX ORDER — VANCOMYCIN HYDROCHLORIDE 750 MG/150ML
750 INJECTION, SOLUTION INTRAVENOUS EVERY 12 HOURS
Status: COMPLETED | OUTPATIENT
Start: 2024-02-22 | End: 2024-02-24

## 2024-02-22 RX ORDER — ALBUMIN HUMAN 50 G/1000ML
12.5 SOLUTION INTRAVENOUS ONCE
Status: COMPLETED | OUTPATIENT
Start: 2024-02-22 | End: 2024-02-22

## 2024-02-22 RX ORDER — MUPIROCIN 20 MG/G
OINTMENT TOPICAL 2 TIMES DAILY
COMMUNITY
End: 2024-02-28 | Stop reason: HOSPADM

## 2024-02-22 RX ORDER — PROPOFOL 10 MG/ML
0-50 INJECTION, EMULSION INTRAVENOUS CONTINUOUS
Status: DISCONTINUED | OUTPATIENT
Start: 2024-02-22 | End: 2024-02-22

## 2024-02-22 RX ORDER — ALBUMIN HUMAN 50 G/1000ML
SOLUTION INTRAVENOUS
Status: COMPLETED
Start: 2024-02-22 | End: 2024-02-22

## 2024-02-22 RX ORDER — LIDOCAINE 560 MG/1
1 PATCH PERCUTANEOUS; TOPICAL; TRANSDERMAL EVERY 24 HOURS
Status: DISPENSED | OUTPATIENT
Start: 2024-02-22 | End: 2024-02-27

## 2024-02-22 RX ORDER — ATORVASTATIN CALCIUM 20 MG/1
40 TABLET, FILM COATED ORAL DAILY
Status: DISCONTINUED | OUTPATIENT
Start: 2024-02-22 | End: 2024-02-28 | Stop reason: HOSPADM

## 2024-02-22 RX ORDER — CHOLECALCIFEROL (VITAMIN D3) 25 MCG
2000 TABLET ORAL DAILY
Status: DISCONTINUED | OUTPATIENT
Start: 2024-02-22 | End: 2024-02-28 | Stop reason: HOSPADM

## 2024-02-22 RX ORDER — MAGNESIUM SULFATE HEPTAHYDRATE 500 MG/ML
INJECTION, SOLUTION INTRAMUSCULAR; INTRAVENOUS AS NEEDED
Status: DISCONTINUED | OUTPATIENT
Start: 2024-02-22 | End: 2024-02-22

## 2024-02-22 RX ORDER — PROPOFOL 10 MG/ML
INJECTION, EMULSION INTRAVENOUS CONTINUOUS PRN
Status: DISCONTINUED | OUTPATIENT
Start: 2024-02-22 | End: 2024-02-22

## 2024-02-22 RX ORDER — POTASSIUM CHLORIDE 20 MEQ/1
40 TABLET, EXTENDED RELEASE ORAL EVERY 6 HOURS PRN
Status: DISCONTINUED | OUTPATIENT
Start: 2024-02-22 | End: 2024-02-28 | Stop reason: HOSPADM

## 2024-02-22 RX ORDER — MAGNESIUM SULFATE HEPTAHYDRATE 40 MG/ML
2 INJECTION, SOLUTION INTRAVENOUS EVERY 6 HOURS PRN
Status: DISCONTINUED | OUTPATIENT
Start: 2024-02-22 | End: 2024-02-28 | Stop reason: HOSPADM

## 2024-02-22 RX ORDER — SODIUM CHLORIDE, SODIUM LACTATE, POTASSIUM CHLORIDE, CALCIUM CHLORIDE 600; 310; 30; 20 MG/100ML; MG/100ML; MG/100ML; MG/100ML
5 INJECTION, SOLUTION INTRAVENOUS CONTINUOUS
Status: DISCONTINUED | OUTPATIENT
Start: 2024-02-22 | End: 2024-02-23

## 2024-02-22 RX ORDER — POTASSIUM CHLORIDE 29.8 MG/ML
40 INJECTION INTRAVENOUS EVERY 6 HOURS PRN
Status: DISCONTINUED | OUTPATIENT
Start: 2024-02-22 | End: 2024-02-25

## 2024-02-22 RX ORDER — BENAZEPRIL HYDROCHLORIDE AND HYDROCHLOROTHIAZIDE 20; 25 MG/1; MG/1
1 TABLET ORAL DAILY
Status: DISCONTINUED | OUTPATIENT
Start: 2024-02-22 | End: 2024-02-23

## 2024-02-22 RX ORDER — DEXTROSE 50 % IN WATER (D50W) INTRAVENOUS SYRINGE
25
Status: DISCONTINUED | OUTPATIENT
Start: 2024-02-22 | End: 2024-02-28 | Stop reason: HOSPADM

## 2024-02-22 RX ORDER — FENTANYL CITRATE 50 UG/ML
INJECTION, SOLUTION INTRAMUSCULAR; INTRAVENOUS AS NEEDED
Status: DISCONTINUED | OUTPATIENT
Start: 2024-02-22 | End: 2024-02-22

## 2024-02-22 RX ORDER — HEPARIN SODIUM 1000 [USP'U]/ML
32000 INJECTION, SOLUTION INTRAVENOUS; SUBCUTANEOUS ONCE
Status: COMPLETED | OUTPATIENT
Start: 2024-02-22 | End: 2024-02-22

## 2024-02-22 RX ORDER — AMINOCAPROIC ACID 250 MG/ML
INJECTION, SOLUTION INTRAVENOUS AS NEEDED
Status: DISCONTINUED | OUTPATIENT
Start: 2024-02-22 | End: 2024-02-22

## 2024-02-22 RX ORDER — IPRATROPIUM BROMIDE AND ALBUTEROL SULFATE 2.5; .5 MG/3ML; MG/3ML
3 SOLUTION RESPIRATORY (INHALATION) EVERY 2 HOUR PRN
Status: DISCONTINUED | OUTPATIENT
Start: 2024-02-22 | End: 2024-02-28 | Stop reason: HOSPADM

## 2024-02-22 RX ORDER — PHENYLEPHRINE HCL IN 0.9% NACL 1 MG/10 ML
SYRINGE (ML) INTRAVENOUS AS NEEDED
Status: DISCONTINUED | OUTPATIENT
Start: 2024-02-22 | End: 2024-02-22

## 2024-02-22 RX ORDER — NITROGLYCERIN 40 MG/100ML
INJECTION INTRAVENOUS AS NEEDED
Status: DISCONTINUED | OUTPATIENT
Start: 2024-02-22 | End: 2024-02-22

## 2024-02-22 RX ORDER — POLYETHYLENE GLYCOL 3350 17 G/17G
17 POWDER, FOR SOLUTION ORAL DAILY
Status: DISCONTINUED | OUTPATIENT
Start: 2024-02-22 | End: 2024-02-22

## 2024-02-22 RX ORDER — MAGNESIUM SULFATE HEPTAHYDRATE 40 MG/ML
2 INJECTION, SOLUTION INTRAVENOUS EVERY 6 HOURS PRN
Status: DISCONTINUED | OUTPATIENT
Start: 2024-02-22 | End: 2024-02-22

## 2024-02-22 RX ORDER — ACETAMINOPHEN 650 MG/1
650 SUPPOSITORY RECTAL EVERY 4 HOURS
Status: DISCONTINUED | OUTPATIENT
Start: 2024-02-22 | End: 2024-02-23

## 2024-02-22 RX ADMIN — PROTAMINE SULFATE 50 MG: 10 INJECTION, SOLUTION INTRAVENOUS at 11:47

## 2024-02-22 RX ADMIN — FENTANYL CITRATE 200 MCG: 50 INJECTION, SOLUTION INTRAMUSCULAR; INTRAVENOUS at 08:09

## 2024-02-22 RX ADMIN — Medication 100 MCG: at 09:06

## 2024-02-22 RX ADMIN — PROPOFOL 150 MG: 10 INJECTION, EMULSION INTRAVENOUS at 08:09

## 2024-02-22 RX ADMIN — INSULIN LISPRO 5 UNITS: 100 INJECTION, SOLUTION INTRAVENOUS; SUBCUTANEOUS at 21:43

## 2024-02-22 RX ADMIN — ROCURONIUM BROMIDE 20 MG: 10 SOLUTION INTRAVENOUS at 09:01

## 2024-02-22 RX ADMIN — SODIUM CHLORIDE: 9 INJECTION, SOLUTION INTRAVENOUS at 07:30

## 2024-02-22 RX ADMIN — HYDROMORPHONE HYDROCHLORIDE 0.2 MG: 0.2 INJECTION, SOLUTION INTRAMUSCULAR; INTRAVENOUS; SUBCUTANEOUS at 23:41

## 2024-02-22 RX ADMIN — IPRATROPIUM BROMIDE AND ALBUTEROL SULFATE 3 ML: 2.5; .5 SOLUTION RESPIRATORY (INHALATION) at 15:37

## 2024-02-22 RX ADMIN — ROCURONIUM BROMIDE 100 MG: 10 SOLUTION INTRAVENOUS at 08:09

## 2024-02-22 RX ADMIN — VANCOMYCIN HYDROCHLORIDE 750 MG: 750 INJECTION, SOLUTION INTRAVENOUS at 20:18

## 2024-02-22 RX ADMIN — PROTAMINE SULFATE 420 MG: 10 INJECTION, SOLUTION INTRAVENOUS at 11:28

## 2024-02-22 RX ADMIN — Medication 2.5 G/HR: at 08:15

## 2024-02-22 RX ADMIN — VANCOMYCIN HYDROCHLORIDE 1.25 G: 1.25 INJECTION, POWDER, LYOPHILIZED, FOR SOLUTION INTRAVENOUS at 08:00

## 2024-02-22 RX ADMIN — Medication 200 MCG: at 08:34

## 2024-02-22 RX ADMIN — ALBUMIN HUMAN 12.5 G: 0.05 INJECTION, SOLUTION INTRAVENOUS at 21:34

## 2024-02-22 RX ADMIN — PHENYLEPHRINE HYDROCHLORIDE 100 MCG: 10 INJECTION INTRAVENOUS at 09:58

## 2024-02-22 RX ADMIN — SODIUM CHLORIDE, SODIUM LACTATE, POTASSIUM CHLORIDE, AND CALCIUM CHLORIDE: .6; .31; .03; .02 INJECTION, SOLUTION INTRAVENOUS at 07:30

## 2024-02-22 RX ADMIN — NOREPINEPHRINE BITARTRATE 0.04 MCG/KG/MIN: 0.03 INJECTION, SOLUTION INTRAVENOUS at 11:20

## 2024-02-22 RX ADMIN — Medication 0.2 MCG/KG/MIN: at 08:20

## 2024-02-22 RX ADMIN — MAGNESIUM SULFATE HEPTAHYDRATE 2 G: 500 INJECTION, SOLUTION INTRAMUSCULAR; INTRAVENOUS at 11:17

## 2024-02-22 RX ADMIN — PROPOFOL 20 MCG/KG/MIN: 10 INJECTION, EMULSION INTRAVENOUS at 13:53

## 2024-02-22 RX ADMIN — Medication 200 MCG: at 08:18

## 2024-02-22 RX ADMIN — SODIUM CHLORIDE, POTASSIUM CHLORIDE, SODIUM LACTATE AND CALCIUM CHLORIDE 50 ML/HR: 600; 310; 30; 20 INJECTION, SOLUTION INTRAVENOUS at 13:38

## 2024-02-22 RX ADMIN — ROCURONIUM BROMIDE 30 MG: 10 SOLUTION INTRAVENOUS at 10:55

## 2024-02-22 RX ADMIN — ALBUMIN HUMAN 25 G: 0.05 INJECTION, SOLUTION INTRAVENOUS at 15:59

## 2024-02-22 RX ADMIN — FENTANYL CITRATE 100 MCG: 50 INJECTION, SOLUTION INTRAMUSCULAR; INTRAVENOUS at 08:55

## 2024-02-22 RX ADMIN — AMINOCAPROIC ACID 5 G: 250 INJECTION, SOLUTION INTRAVENOUS at 08:15

## 2024-02-22 RX ADMIN — ROCURONIUM BROMIDE 30 MG: 10 SOLUTION INTRAVENOUS at 09:43

## 2024-02-22 RX ADMIN — ASPIRIN 150 MG: 300 SUPPOSITORY RECTAL at 13:37

## 2024-02-22 RX ADMIN — IPRATROPIUM BROMIDE AND ALBUTEROL SULFATE 3 ML: 2.5; .5 SOLUTION RESPIRATORY (INHALATION) at 23:54

## 2024-02-22 RX ADMIN — PROPOFOL 25 MCG/KG/MIN: 10 INJECTION, EMULSION INTRAVENOUS at 12:40

## 2024-02-22 RX ADMIN — INSULIN LISPRO 5 UNITS: 100 INJECTION, SOLUTION INTRAVENOUS; SUBCUTANEOUS at 18:37

## 2024-02-22 RX ADMIN — SODIUM CHLORIDE, POTASSIUM CHLORIDE, SODIUM LACTATE AND CALCIUM CHLORIDE 50 ML/HR: 600; 310; 30; 20 INJECTION, SOLUTION INTRAVENOUS at 23:41

## 2024-02-22 RX ADMIN — NITROGLYCERIN 100 MCG: 10 INJECTION INTRAVENOUS at 08:56

## 2024-02-22 RX ADMIN — NOREPINEPHRINE BITARTRATE 0.01 MCG/KG/MIN: 8 INJECTION, SOLUTION INTRAVENOUS at 16:47

## 2024-02-22 RX ADMIN — HEPARIN SODIUM 32000 UNITS: 1000 INJECTION INTRAVENOUS; SUBCUTANEOUS at 09:45

## 2024-02-22 RX ADMIN — MIDAZOLAM 5 MG: 1 INJECTION INTRAMUSCULAR; INTRAVENOUS at 07:25

## 2024-02-22 RX ADMIN — LIDOCAINE 1 PATCH: 4 PATCH TOPICAL at 13:37

## 2024-02-22 RX ADMIN — NITROGLYCERIN 50 MCG: 10 INJECTION INTRAVENOUS at 08:51

## 2024-02-22 RX ADMIN — ACETAMINOPHEN 650 MG: 650 SUPPOSITORY RECTAL at 13:38

## 2024-02-22 RX ADMIN — PHENYLEPHRINE HYDROCHLORIDE 5000 MCG: 10 INJECTION INTRAVENOUS at 10:03

## 2024-02-22 RX ADMIN — HYDROMORPHONE HYDROCHLORIDE 0.2 MG: 0.2 INJECTION, SOLUTION INTRAMUSCULAR; INTRAVENOUS; SUBCUTANEOUS at 14:58

## 2024-02-22 RX ADMIN — HYDROMORPHONE HYDROCHLORIDE 0.2 MG: 0.2 INJECTION, SOLUTION INTRAMUSCULAR; INTRAVENOUS; SUBCUTANEOUS at 18:38

## 2024-02-22 RX ADMIN — ALBUMIN HUMAN 25 G: 50 SOLUTION INTRAVENOUS at 15:59

## 2024-02-22 RX ADMIN — MIDAZOLAM 3 MG: 1 INJECTION INTRAMUSCULAR; INTRAVENOUS at 12:49

## 2024-02-22 RX ADMIN — FENTANYL CITRATE 100 MCG: 50 INJECTION, SOLUTION INTRAMUSCULAR; INTRAVENOUS at 08:49

## 2024-02-22 RX ADMIN — LIDOCAINE HYDROCHLORIDE 100 MG: 10 INJECTION, SOLUTION INFILTRATION; PERINEURAL at 11:17

## 2024-02-22 RX ADMIN — ROCURONIUM BROMIDE 20 MG: 10 SOLUTION INTRAVENOUS at 11:51

## 2024-02-22 ASSESSMENT — ACTIVITIES OF DAILY LIVING (ADL)
WALKS IN HOME: INDEPENDENT
PATIENT'S MEMORY ADEQUATE TO SAFELY COMPLETE DAILY ACTIVITIES?: YES
JUDGMENT_ADEQUATE_SAFELY_COMPLETE_DAILY_ACTIVITIES: YES
BATHING: INDEPENDENT
HEARING - RIGHT EAR: FUNCTIONAL
LACK_OF_TRANSPORTATION: NO
DRESSING YOURSELF: INDEPENDENT
FEEDING YOURSELF: INDEPENDENT
TOILETING: INDEPENDENT
HEARING - LEFT EAR: FUNCTIONAL
ADEQUATE_TO_COMPLETE_ADL: YES
GROOMING: INDEPENDENT

## 2024-02-22 ASSESSMENT — COGNITIVE AND FUNCTIONAL STATUS - GENERAL
MOBILITY SCORE: 16
TOILETING: A LOT
MOVING FROM LYING ON BACK TO SITTING ON SIDE OF FLAT BED WITH BEDRAILS: A LITTLE
MOVING TO AND FROM BED TO CHAIR: A LITTLE
PERSONAL GROOMING: A LOT
TURNING FROM BACK TO SIDE WHILE IN FLAT BAD: A LITTLE
DAILY ACTIVITIY SCORE: 14
MOVING TO AND FROM BED TO CHAIR: A LITTLE
CLIMB 3 TO 5 STEPS WITH RAILING: A LOT
HELP NEEDED FOR BATHING: A LOT
PERSONAL GROOMING: A LOT
WALKING IN HOSPITAL ROOM: A LITTLE
EATING MEALS: A LITTLE
HELP NEEDED FOR BATHING: A LOT
CLIMB 3 TO 5 STEPS WITH RAILING: A LOT
EATING MEALS: A LITTLE
DRESSING REGULAR UPPER BODY CLOTHING: A LOT
MOVING FROM LYING ON BACK TO SITTING ON SIDE OF FLAT BED WITH BEDRAILS: A LITTLE
MOBILITY SCORE: 15
STANDING UP FROM CHAIR USING ARMS: A LOT
DRESSING REGULAR LOWER BODY CLOTHING: A LITTLE
WALKING IN HOSPITAL ROOM: A LOT
TURNING FROM BACK TO SIDE WHILE IN FLAT BAD: A LITTLE
TOILETING: A LOT
STANDING UP FROM CHAIR USING ARMS: A LOT
DRESSING REGULAR UPPER BODY CLOTHING: A LOT
DAILY ACTIVITIY SCORE: 14
DRESSING REGULAR LOWER BODY CLOTHING: A LITTLE
PATIENT BASELINE BEDBOUND: NO

## 2024-02-22 ASSESSMENT — PAIN - FUNCTIONAL ASSESSMENT
PAIN_FUNCTIONAL_ASSESSMENT: 0-10

## 2024-02-22 ASSESSMENT — COLUMBIA-SUICIDE SEVERITY RATING SCALE - C-SSRS
6. HAVE YOU EVER DONE ANYTHING, STARTED TO DO ANYTHING, OR PREPARED TO DO ANYTHING TO END YOUR LIFE?: NO
2. HAVE YOU ACTUALLY HAD ANY THOUGHTS OF KILLING YOURSELF?: NO
1. IN THE PAST MONTH, HAVE YOU WISHED YOU WERE DEAD OR WISHED YOU COULD GO TO SLEEP AND NOT WAKE UP?: NO

## 2024-02-22 ASSESSMENT — PAIN SCALES - GENERAL
PAINLEVEL_OUTOF10: 0 - NO PAIN
PAINLEVEL_OUTOF10: 3
PAINLEVEL_OUTOF10: 1
PAINLEVEL_OUTOF10: 7
PAINLEVEL_OUTOF10: 7

## 2024-02-22 ASSESSMENT — PAIN DESCRIPTION - DESCRIPTORS
DESCRIPTORS: SORE;TIGHTNESS
DESCRIPTORS: ACHING

## 2024-02-22 ASSESSMENT — PAIN DESCRIPTION - LOCATION
LOCATION: CHEST
LOCATION: CHEST

## 2024-02-22 NOTE — NURSING NOTE
Referral noted for diabetes educator. Patient's A1C is 5.6. diabetes education is not indicated at this time.

## 2024-02-22 NOTE — OP NOTE
Creation Bypass Graft Coronary ArteryX3(LIMA-LAD, SVG-PL, SVG-DIAG); EVH, LAUREN, Operative Note     Date: 2024  OR Location: [facility]    Name: Sylvester Carvalho, : 1945, Age: 78 y.o., MRN: [id number], Sex: male    Diagnosis  Pre-op Diagnosis     * Coronary artery disease involving native coronary artery of native heart, unspecified whether angina present [I25.10] Post-op Diagnosis     * Coronary artery disease involving native coronary artery of native heart, unspecified whether angina present [I25.10]     Procedures  Creation Bypass Graft Coronary ArteryX3(LIMA-LAD, SVG-PL, SVG-DIAG); EVH, LAUREN,  [id number] - OH CABG W/ARTERIAL GRAFT TWO ARTERIAL GRAFTS      Surgeons      * Amarilys Chung - Primary    Resident/Fellow/Other Assistant:  Surgeon(s) and Role:     * Hugo Johnston MD - Assisting    Procedure Summary  Anesthesia: General  ASA: IV  Anesthesia Staff: Anesthesiologist: Venkata Hagen MD  CRNA: NATE Crews-CRNA  Perfusionist: Tabatha Laird  Estimated Blood Loss: 250 mL  Intra-op Medications:   Administrations occurring from 0730 to 1225 on 24:   Medication Name Total Dose   papaverine injection 60 mg   sodium chloride 0.9 % irrigation solution 4,000 mL   vancomycin (Vancocin) vial for injection 4 g   heparin 10,000 Units in sodium chloride 0.9 % 1,000 mL irrigation 2,000 mL   aminocaproic acid (Amicar) infusion 10.42 g   heparin 1,000 unit/mL injection 32,000 Units 32,000 Units   protamine 420 mg in sodium chloride 0.9% 50 mL  mg   vancomycin (Vancocin) in dextrose 5 % water (D5W) 250 mL IV 1,250 mg 1.25 g              Anesthesia Record               Intraprocedure I/O Totals          Intake    Transfuse Platelets 284.00 mL    LR bolus 1500.00 mL    NaCl 0.9 % bolus 1000.00 mL    Norepinephrine Drip 0.00 mL    The total shown is the total volume documented since Anesthesia Start was filed.    Aminocaproic Acid Drip 0.00 mL    The total shown is the total volume  documented since Anesthesia Start was filed.    Phenylephrine Drip 0.00 mL    The total shown is the total volume documented since Anesthesia Start was filed.    protamine 420 mg in sodium chloride 0.9% 50 mL IV 92.00 mL    Cell Saver 372 mL    Total Intake 3248 mL       Output    Urine 575 mL    Total Output 575 mL       Net    Net Volume 2673 mL          Specimen: No specimens collected     Staff:   Circulator: Pearl Alva RN  Relief Circulator: Camron Ho RN  Scrub Person: TAMIA Reyes      Tourniquet Times:         Implants:  Implants       Type Name Action Serial No.      Other Cardiac Implant CARDIOPLEGIA SET - [id number] Implanted                 Indications: Sylvester Carvalho is an 78 y.o. male who is having surgery for coronary artery disease  The patient was seen in the preoperative area. The risks, benefits, complications, treatment options, non-operative alternatives, expected recovery and outcomes were discussed with the patient. The possibilities of reaction to medication, pulmonary aspiration, injury to surrounding structures, bleeding, recurrent infection, the need for additional procedures, failure to diagnose a condition, and creating a complication requiring transfusion or operation were discussed with the patient. The patient concurred with the proposed plan, giving informed consent.  The site of surgery was properly noted/marked if necessary per policy. The patient has been actively warmed in preoperative area. Preoperative antibiotics have been ordered and given within 1 hours of incision. Venous thrombosis prophylaxis are not indicated.    Procedure Details: This is a PREOPERATIVE DIAGNOSIS:  Pre-Op Diagnosis Codes:     * Coronary artery disease involving native coronary artery of native heart, unspecified whether angina present [I25.10]    POSTOPERATIVE DIAGNOSIS:  same    OPERATION/PROCEDURE:  1. Creation Bypass Graft Coronary ArteryX3(LIMA-LAD, SVG-PL, SVG-DIAG); EVH,  LAUREN,     SURGEON:     * Amarilys Chung - Primary     * Hugo Johnston - Assisting   There was no qualified resident available so Dr. Dumont was asked to assist, he performed the sternotomy harvested the mammary artery assisted through the whole case and did the proximal anastomosis of the vein to the posterolateral artery on the aorta      ANESTHESIA TYPE:  General    ANESTHESIOLOGIST:  Procedure(s) and Anesthesia Type:     * Creation Bypass Graft Coronary ArteryX3(LIMA-LAD, SVG-PL, SVG-DIAG); EVH, LAUREN, - General    ANESTHESIA STAFF  Anesthesiologist: Venkata Hagen MD  CRNA: NATE Crews-CRNA  Perfusionist: Tabatha Laird     BYPASS:  78 minutes    CROSSCLAMP:  70    EBL:     250 mL     OPERATIVE INDICATIONS:  The patient is a 78-year-old with symptomatic coronary artery disease.  Coronary angiography demonstrated 3 vessels coronary artery disease.  Echocardiography demonstrated normal left ventricular function and no significant valvular heart disease.  The patient was referred for coronary artery bypass grafting.    OPERATIVE FINDINGS:  There were no pericardial adhesions or effusions.  The ascending aorta was soft without evidence of atherosclerosis. The patient had severe diffuse coronary artery disease, however we were able to find locations on the coronary arteries that was suitable for grafting.    OPERATIVE PROCEDURE:  The patient was brought to the operating room, placed on the operating table in supine position.  General endotracheal anesthesia and hemodynamic monitoring were established.  The patient was prepped and draped in standard fashion. A median sternotomy was performed.  The LIMA was harvested in a usual manner.   The vein was harvested endoscopically from the left lower limb.  The thymus was divided and the pericardium was opened.  The ascending aorta was palpated and it was without evidence of atherosclerosis.  The patient was heparinized.  The ascending aorta and  right atrium were cannulated for cardiopulmonary bypass and antegrade and retrograde cardioplegia cannulas were placed.  The patient was placed on cardiopulmonary bypass, the ascending aorta was crossclamped, and the heart was arrested and protected with cold blood cardioplegia.  During the remainder of the cross-clamp time, cold blood cardioplegia was administered every 15 to 20 minutes.      GRAFTS:   LIMA- LAD  SVG- D1  SVG-PL of RCA      Graft 1, PL:  This vessel was opened proximally, which was 1.5 mm in diameter, free from disease at the point of entry.  A length of reverse saphenous vein was anastomosed end-to-side using continuous 7-0 Prolene.     Graft 2, D1:    This vessel was opened in its mid course which was 1.5 mm in diameter, free from disease at the point of entry. The length of reverse saphenous vein was anastomosed end-to-side using continuous 7-0 Prolene.     Graft 3, LAD:    This vessel was opened in its mid to distal third, which was 1.5 mm in diameter, free from disease at the point of entry. The LIMA was anastomosed end-to-side using continuous 8-0 Prolene.     The proximal end of the vein grafts was anastomosed to punch holes into the ascending aorta using continuous 6-0 Prolene sutures.     Assessment of graft flows demonstrated good flows in all the grafts.    Cardiopulmonary bypass was weaned uneventfully.  One right ventricular pacing wires were applied. Protamine was administered to reverse systemic anticoagulation. Hemostasis was secured.     Two drains were inserted, 1 in the left pleural space and 1 in the mediastinum.  The sternum was closed with interrupted stainless steel wires and subcutaneous layers were closed using Vicryl and skin was closed using subcuticular Vicryl.      Complications:  None; patient tolerated the procedure well.    Disposition: ICU - intubated and hemodynamically stable.  Condition: stable       Attending Attestation: I was present and scrubbed for the  entire procedure.    Amarilys Chung  Phone Number: [phone]

## 2024-02-22 NOTE — BRIEF OP NOTE
Date: 2024  OR Location: [facility]    Name: Sylvester Carvalho, : 1945, Age: 78 y.o., MRN: [id number], Sex: male    Diagnosis  Pre-op Diagnosis     * Coronary artery disease involving native coronary artery of native heart, unspecified whether angina present [I25.10] Post-op Diagnosis     * Coronary artery disease involving native coronary artery of native heart, unspecified whether angina present [I25.10]     Procedures  Creation Bypass Graft Coronary ArteryX3(LIMA-LAD, SVG-PL, SVG-DIAG); EVH, LAUREN,  08902 - DE CABG W/ARTERIAL GRAFT TWO ARTERIAL GRAFTS      Surgeons      * Amarilys Chung - Primary    Resident/Fellow/Other Assistant:  Surgeon(s) and Role:     * Hugo Johnston MD - Assisting  MISSAEL Navarro      Chest Tubes/Drains:  L Pleural X1, R Pleural X1 Mediastinal X1 to Atriums X2       Temporary Pacing Wires: V Wires        Permanent pacer/ICD: No   -Preoperative settings:    -Intra-op/ Postoperative settings:    Sternotomy performed by: l Diasty    Conduit Harvested by:Left SVG EZEQUIEL Kemp    Sternal Wires placed by: RENETTA Navarro    Arm/Leg/Groin Closure/Cutdown performed by: Left SVG EVH -Closure Nidhi    Cardio Pulmonary Bypass Time: 78 Minutes  Cross-clamp Time: 70 Minutes  Circulatory Arrest: No Time:     Is patient candidate for Emergency Re-sternotomy? No   -If yes, POD #10 is -     Procedure Summary  Anesthesia: General  ASA: IV  Anesthesia Staff: Anesthesiologist: Venkata Hagen MD  CRNA: NATE Crews-CRNA  Perfusionist: Tabatha Laird  Estimated Blood Loss: 250mL  Intra-op Medications:   Administrations occurring from 0730 to 1225 on 24:   Medication Name Total Dose   papaverine injection 60 mg   sodium chloride 0.9 % irrigation solution 4,000 mL   vancomycin (Vancocin) vial for injection 4 g   heparin 10,000 Units in sodium chloride 0.9 % 1,000 mL irrigation 2,000 mL   aminocaproic acid (Amicar) infusion 10.42 g   heparin 1,000 unit/mL injection 32,000 Units  32,000 Units   protamine 420 mg in sodium chloride 0.9% 50 mL  mg   vancomycin (Vancocin) in dextrose 5 % water (D5W) 250 mL IV 1,250 mg 1.25 g              Anesthesia Record               Intraprocedure I/O Totals          Intake    Transfuse Platelets 284.00 mL    LR bolus 1500.00 mL    NaCl 0.9 % bolus 1000.00 mL    Norepinephrine Drip 0.00 mL    The total shown is the total volume documented since Anesthesia Start was filed.    Aminocaproic Acid Drip 0.00 mL    The total shown is the total volume documented since Anesthesia Start was filed.    Phenylephrine Drip 0.00 mL    The total shown is the total volume documented since Anesthesia Start was filed.    protamine 420 mg in sodium chloride 0.9% 50 mL IV 92.00 mL    Cell Saver 372 mL    Total Intake 3248 mL       Output    Urine 575 mL    Total Output 575 mL       Net    Net Volume 2673 mL          Specimen: No specimens collected     Staff:   Circulator: Pearl Alva RN  Relief Circulator: Camron Ho RN  Scrub Person: Mariza Perkins RN          Findings: CAD    Complications:  None; patient tolerated the procedure well.     Disposition: ICU - intubated and hemodynamically stable.  Condition: stable  Specimens Collected: No specimens collected  Attending Attestation:       Amarilys Chung  Phone Number: [phone]

## 2024-02-22 NOTE — ANESTHESIA PROCEDURE NOTES
Central Venous Line:    Date/Time: 2/22/2024 7:30 AM    A central venous line was placed in the pre-op for the following indication(s): central venous access and CVP monitoring.  Staffing  Performed: attending   Authorized by: Venkata Hagen MD    Performed by: Venkata Hagen MD    Sterility preparation included the following: provider hand hygiene performed prior to central venous catheter insertion, all 5 sterile barriers used (gloves, gown, cap, mask, large sterile drape) during central venous catheter insertion, antiseptic used during central venous catheter insertion and skin prep agent completely dried prior to procedure.  The patient was placed in Trendelenburg position.    Right internal jugular vein was prepped.    The site was prepped with Chlorhexidine.  Size: 9 Fr   Length: 10  Catheter type: introducer   Number of Lumens: double lumen    This catheter was an oximetric catheter.    During the procedure, the following specific steps were taken: target vein identified, needle advanced into vein and blood aspirated and guidewire advanced into vein.  Seldinger technique used.  Procedure performed using ultrasound guidance.  Sterile gel and probe cover used in ultrasound-guided central venous catheter insertion.    Intravenous verification was obtained by ultrasound and venous blood return.      Post insertion care included: all ports aspirated, all ports flushed easily, guidewire removed intact, Biopatch applied, line sutured in place, dressing applied and suture x3.    During the procedure the patient experienced: patient tolerated procedure well with no complications and No PTX on CXR.           images stored in chartA oximetric, 7.5 (size) Pulmonary Artery Catheter (PAC) was placed through the Introducer CVL in the right internal jugular vein.  The PAC placement was confirmed by pressure tracing changes and x-ray and secured at 50 cm (depth).  The patient experienced the following events during the  procedure: patient tolerated procedure well with no complications.

## 2024-02-22 NOTE — H&P
[facility] Critical Care Medicine       Date:  2/22/2024  Patient:  Sylvester Carvalho  YOB: 1945  MRN:  [id number]   Admit Date:  2/22/2024  ========================================================================================================    No chief complaint on file.        History of Present Illness:  Sylvester Carvalho is a 78 y.o. year old male patient with Past Medical History of  GERD, BPH, HLD, HTN, CAD who underwent CABG x3 with Dr. Chung on 2/22/2024. He was admitted to ICU post-operatively for recovery, intubated, on low dose norepinephrine, CTx3, RIJ PAC.      Interval ICU Events:  2/22: POD #0, admitted to SICU 12    Medical History:  Past Medical History:   Diagnosis Date    Arthritis     BPH (benign prostatic hyperplasia)     Cataract     Coronary artery disease     COVID-19     VACCINATED    Diverticulosis     Exudative age-related macular degeneration, left eye, stage unspecified (CMS/[facility]) 01/31/2018    Age-related macular degeneration, wet, left eye    GERD (gastroesophageal reflux disease)     History of squamous cell carcinoma of skin     Hyperlipidemia     Hypertension     Personal history of other endocrine, nutritional and metabolic disease 01/24/2018    History of high cholesterol    Wears glasses      Past Surgical History:   Procedure Laterality Date    CARDIAC CATHETERIZATION N/A 02/02/2024    Procedure: Left Heart Cath, With LV;  Surgeon: Pedro Zelaya MD;  Location: [facility] Cardiac Cath Lab;  Service: Cardiovascular;  Laterality: N/A;  Ranexa 500 mg on admit    COLONOSCOPY      TOTAL HIP ARTHROPLASTY Left 01/24/2018    Hip Replacement     Medications Prior to Admission   Medication Sig Dispense Refill Last Dose    amLODIPine (Norvasc) 2.5 mg tablet Take 1 tablet (2.5 mg) by mouth once daily. 90 tablet 1 Past Week    aspirin 81 mg chewable tablet Chew 1 tablet (81 mg) once daily. 30 tablet 11 2/21/2024 at 0800    atorvastatin (Lipitor) 40 mg tablet Take 1 tablet (40 mg) by  mouth once daily. 90 tablet 3 2024 at 2100    benazepriL-hydrochlorothiazide (Lotensin HCT) 20-25 mg tablet Take 1 tablet by mouth once daily. 90 tablet 1 2024 at 0800    cholecalciferol (Vitamin D-3) 50 MCG ( UT) tablet Take 1 tablet (2,000 Units) by mouth once daily.   2024 at 08/00    mupirocin (Bactroban) 2 % ointment Apply topically 2 times a day. TWICE ADAY  SINCE 24 at 2100    mv-min/FA/vit K/lutein/zeaxant (PRESERVISION AREDS 2 PLUS MV ORAL) Take by mouth.   2024 at 0800    pantoprazole (ProtoNix) 40 mg EC tablet Take 1 tablet (40 mg) by mouth once daily in the morning. Take before meals. Do not crush, chew, or split. 90 tablet 1 2024 at 0800    nitroglycerin (Nitrostat) 0.4 mg SL tablet Place 1 tablet (0.4 mg) under the tongue every 5 minutes if needed for chest pain. May repeat every 5 minutes for up to 3 doses. (Patient not taking: Reported on 2024) 25 tablet 3 Not Taking     Penicillins  Social History     Tobacco Use    Smoking status: Former     Packs/day: 1.00     Years: 36.00     Additional pack years: 0.00     Total pack years: 36.00     Types: Cigarettes     Start date: 1964     Quit date: 2000     Years since quittin.1    Smokeless tobacco: Never    Tobacco comments:     I quit smoking many many times over the years so i didnt smoke continually for 36 years notes above   Vaping Use    Vaping Use: Never used   Substance Use Topics    Alcohol use: Yes     Alcohol/week: 2.0 standard drinks of alcohol     Types: 2 Standard drinks or equivalent per week     Comment: Occasional and always socially    Drug use: Never     Family History   Problem Relation Name Age of Onset    Other (stroke syndrome) Mother Marcia     Stroke Mother Marcia     Lung cancer Father      No Known Problems Other         Review of Systems:  14 point review of systems was completed and negative except for those specially mention in my HPI    Physical  "Exam:    Heart Rate:  [50-78]   Temp:  [36.2 °C (97.2 °F)]   Resp:  [10-16]   BP: (114-157)/(74-87)   Height:  [167.6 cm (5' 6\")]   Weight:  [80.6 kg (177 lb 11.1 oz)]   SpO2:  [97 %-100 %]     Physical Exam  Vitals and nursing note reviewed.   Constitutional:       General: He is not in acute distress.     Interventions: He is sedated and intubated.   HENT:      Mouth/Throat:      Mouth: Mucous membranes are dry.   Eyes:      Pupils: Pupils are equal, round, and reactive to light.   Cardiovascular:      Rate and Rhythm: Normal rate and regular rhythm.      Pulses: Normal pulses.      Heart sounds: Normal heart sounds.      Comments: Paced, VVI @50bpm  Pulmonary:      Effort: Pulmonary effort is normal. He is intubated.      Breath sounds: Normal breath sounds.   Chest:      Comments: L/R pleural CT, Mediastinal CT  Abdominal:      General: Bowel sounds are normal.      Palpations: Abdomen is soft.   Genitourinary:     Comments: Angulo- clear/yellow  Musculoskeletal:      Right lower leg: No edema.      Left lower leg: No edema.   Skin:     General: Skin is warm and dry.      Capillary Refill: Capillary refill takes less than 2 seconds.      Comments: LLE ACE wrap, R groin site CDI, Midsternal incision CDI   Neurological:      General: No focal deficit present.         Objective:    I have reviewed all medications, laboratory results, and imaging pertinent for today's encounter      Intra-Op:  Procedure/Surgeon: CABG x 3 ... With Dr. Chung on 2/22/2024  Frontliner/Anesthesia: Doni REINA  Out of OR Time (document on ventilator card): 1:10pm     OR Course/Complications: None reported     CPB time: 78  Cross clamp time: 70  Chest Tubes/Drains: 1 left pleural, 1 right pleural, 1 mediastinal   Temporary wires location/setting: V wires, VVI backup @ 50     Fluids-  Crystalloid: 3L   Colloid: 0cc  Cellsaver: 370cc  Products: 0 UPRBC, 1 PLT  EBL: 0cc  UOP: 575 cc      Assessment/Plan:    I am currently managing this " critically ill patient for the following problems:    Neuro/Psych/Pain Ctrl/Sedation:  Post-op pain - likely incisional pain  - Pt currently intubated and sedated on propofol   - Serial neuro and pain assessments   - Scheduled Tylenol  - PRN oxycodone and dilaudid for pain  - Lidocaine patch  - CAM ICU qshift   - Sleep/wake hygiene, delirium precautions     Respiratory/ENT:  Post-Op Respiratory Insufficiency  Arrived to ICU intubated on ventilator with appropriate oxygenation and ventilation. Left, right, mediastinal chest tubes with no airleak and appropriate serosang output, to suction   - No underlying pulmonary disease  - Wean FiO2 to maintain SpO2 goal >92%, PaO2 >60mmHg  - 2 pleural, 1 mediastinal chest tube, maintain to wall suction, monitor output, notify provider if > 100cc/hr  - Wean to extubate   - Continue sedation with propofol infusion, wean as tolerated, RASS goal -1 to 0  - ABG as needed  - Daily CXR while in ICU   - Continuous pulse ox monitoring   - Bronchopulmonary hygiene and incentive spirometry once extubated     Cardiovascular:  Triple Vessel CAD s/p CABG x 3 LIMA-LAD, SVG-PL, SVG-DIAG  Hx HTN HLD  - V wires in place, paced DDD @80 due to underlying junctional rhythm; assessed at bedside with intrinsic rhythm NSR 67, placed on backup VVI @50  - Volume resuscitation postoperatively albumin 5%  - PAC in place, monitor hemodynamics   - Wean vasoactive medications to MAP 70-90, CI > 2.2, SVO2 >65%  - Start aspirin within 6 hours post-op then daily   - Statin POD #1 (tomorrow)  - CVS following and appreciate further management  - Continuous cardiac monitoring per ICU protocol  - Daily EKGs while in ICU    GI:  GERD  - NPO with OG  - Continue PPI   - Swallow eval 4 hours post-extubation   - BR with miralax, senna BID, PRN dulc suppositories     Renal/Volume Status (Intra & Extravascular):  Hx BPH  Angulo catheter in place with adequate UOP -- remove on or prior to POD2   - Goal urine output  0.5-1.0cc/kg/hr  - Monitor I/O's  - Consider diuresis POD 1-2   - Replete electrolytes to maintain K >4.0 and Mg >2.0  - Daily BMP, Mg, Phos     Endocrine  - Strict blood glucose control post-operatively  - SSI q4hrs while NPO  - Monitor for hyper/hypoglycemia     Infectious Disease:  - Melissa-op Vancomycin x 48 hours  - Monitor SIRS criteria    Heme/Onc:  - Monitor for s/sx of anemia such as bleeding and bruising   - Transfuse if Hgb <7.0 or massive blood loss with hemodynamic instability  - Daily CBC    MSK:  - PT/OT consult and OOB POD #1 and to chair as tolerated   - Padded pressure points   - Bed bound/ambulatory at baseline    Skin  - ICU skin protocol    Ethics/Code Status:  Full code     :  DVT Prophylaxis: SCDs; will discuss adding medical proph tmrw   GI Prophylaxis: PPI   Bowel Regimen: Miralax, senna, PRN dulc   Diet: NPO  CVC: RIJ PAC  Bridgette: Right brachial  Angulo: yes  Restraints: yes  Dispo: ICU      Critical Care Time:  60 minutes spent in preparing to see patient (I.e.labs,imaging, etc.), documentation, discussion plan of care with patient/family/caregiver, and/ or coordination of care with multidisciplinary team including the attending. Time does not include completion of procedure time.     This note was prepared using voice recognition software. The details of this note are correct and have been reviewed, and corrected to the best of my ability. Some grammatical areas may persist related to the Dragon software.     Isha Herbert APRN-CNP CCRN  Pulmonology & Critical Care Medicine  [facility]

## 2024-02-22 NOTE — ANESTHESIA PROCEDURE NOTES
Airway  Date/Time: 2/22/2024 8:11 AM  Urgency: elective    Airway not difficult    Staffing  Performed: CRNA   Authorized by: Venkata Hagen MD    Performed by: NATE Crews-NUNO  Patient location during procedure: OR    Indications and Patient Condition  Indications for airway management: anesthesia  Spontaneous ventilation: present  Sedation level: deep  Preoxygenated: yes  Patient position: sniffing  MILS maintained throughout  Mask difficulty assessment: 2 - vent by mask + OA or adjuvant +/- NMBA    Final Airway Details  Final airway type: endotracheal airway      Successful airway: ETT  Cuffed: yes   Successful intubation technique: video laryngoscopy  Facilitating devices/methods: intubating stylet  Endotracheal tube insertion site: oral  Blade: Kevin  Blade size: #4  ETT size (mm): 8.0  Cormack-Lehane Classification: grade IIa - partial view of glottis  Placement verified by: capnometry   Measured from: lips  ETT to lips (cm): 22  Number of attempts at approach: 1

## 2024-02-22 NOTE — H&P
HPI:   Mr. Sylvester Carvalho is a 78 y.o. male, who is referred for surgical evaluation for coronary artery disease     Past Medical History:  2018: Exudative age-related macular degeneration, left eye,   stage unspecified (CMS/HCC)      Comment:  Age-related macular degeneration, wet, left eye  GERD (gastroesophageal reflux disease)  Hyperlipidemia  Hypertension  Personal history of other endocrine, nutritional and   metabolic disease      Comment:  History of high cholesterol     Past Surgical History:  2024: CARDIAC CATHETERIZATION; N/A      Comment:  Procedure: Left Heart Cath, With LV;  Surgeon: Pedro Zelaya MD;  Location: [facility] Cardiac [facility];                 Service: Cardiovascular;  Laterality: N/A;  Ranexa 500 mg               on admit  2018: TOTAL HIP ARTHROPLASTY      Comment:  Hip Replacement     Review of patient's family history indicates:  Problem: Other (stroke syndrome)      Relation: Mother          Name: Marcia              Age of Onset: (Not Specified)  Problem: Stroke      Relation: Mother          Name: Marcia              Age of Onset: (Not Specified)  Problem: Lung cancer      Relation: Father          Name:               Age of Onset: (Not Specified)  Problem: No Known Problems      Relation: Other          Name:               Age of Onset: (Not Specified)        Social History    Socioeconomic History      Marital status:       Spouse name: Not on file      Number of children: Not on file      Years of education: Not on file      Highest education level: Not on file    Occupational History      Not on file    Tobacco Use      Smoking status: Former        Packs/day: 1.00        Years: 36.00        Additional pack years: 0.00        Total pack years: 36.00        Types: Cigarettes        Start date: 1964        Quit date: 2000        Years since quittin.1      Smokeless tobacco: Never      Tobacco comments: I quit smoking many many  times over the years so i didnt smoke continually for 36 years notes above    Vaping Use      Vaping Use: Never used    Substance and Sexual Activity      Alcohol use: Yes        Alcohol/week: 2.0 standard drinks of alcohol        Types: 2 Standard drinks or equivalent per week        Comment: Occasional and always socially      Drug use: Never      Sexual activity: Not Currently        Partners: Female    Other Topics      Concerns:        Not on file    Social History Narrative      Not on file     Social Determinants of Health  Financial Resource Strain: Not on file  Food Insecurity: Not on file  Transportation Needs: Not on file  Physical Activity: Not on file  Stress: Not on file  Social Connections: Not on file  Intimate Partner Violence: Not on file  Housing Stability: Not on file      -- Penicillins -- Unknown     Outpatient Encounter Medications as of 2/8/2024:  amLODIPine (Norvasc) 2.5 mg tablet, Take 1 tablet (2.5 mg) by mouth once daily., Disp: 90 tablet, Rfl: 1  aspirin 81 mg chewable tablet, Chew 1 tablet (81 mg) once daily., Disp: 30 tablet, Rfl: 11  atorvastatin (Lipitor) 40 mg tablet, Take 1 tablet (40 mg) by mouth once daily., Disp: 90 tablet, Rfl: 3  benazepriL-hydrochlorothiazide (Lotensin HCT) 20-25 mg tablet, Take 1 tablet by mouth once daily., Disp: 90 tablet, Rfl: 1  cholecalciferol (Vitamin D-3) 50 MCG (2000 UT) tablet, Take 1 tablet (2,000 Units) by mouth once daily., Disp: , Rfl:   mv-min/FA/vit K/lutein/zeaxant (PRESERVISION AREDS 2 PLUS MV ORAL), Take by mouth., Disp: , Rfl:   nitroglycerin (Nitrostat) 0.4 mg SL tablet, Place 1 tablet (0.4 mg) under the tongue every 5 minutes if needed for chest pain. May repeat every 5 minutes for up to 3 doses., Disp: 25 tablet, Rfl: 3  pantoprazole (ProtoNix) 40 mg EC tablet, Take 1 tablet (40 mg) by mouth once daily in the morning. Take before meals. Do not crush, chew, or split., Disp: 90 tablet, Rfl: 1     No facility-administered encounter  medications on file as of 2/8/2024.        [unfilled]     Encounter Date: 02/02/24  -ECG 12 lead STAT:        Result                      Value                           Ventricular Rate            52                              Atrial Rate                 52                              W[place] Interval                 216                             QRS Duration                82                              QT Interval                 436                             QTC Calculation(Bazett)     405                             P Axis                      36                              R Axis                      -8                              T Axis                      -6                              QRS Count                   9                               Q Onset                     214                             P Onset                     106                             P Offset                    157                             T Offset                    432                             QTC Fredericia              415                                                                                                            Narrative                          Sinus bradycardia with 1st degree AV block               Otherwise normal ECG                 No previous ECGs available                 Confirmed by Homer Nix ([id number]) on 2/2/2024 3:24:56 PM     Assessment and Plan:    Mr. Sylvester Carvalho is a 78 y.o. has severe coronary artery disease and is a good candidate for surgical revascularization.  Surgical procedure and risk factors has been discussed with the patient and he agreed to have the surgery performed and the date accepted it was February 22

## 2024-02-22 NOTE — ANESTHESIA POSTPROCEDURE EVALUATION
Patient: Sylvester Carvalho    Procedure Summary       Date: 02/22/24 Room / Location: [facility] OR 09 / [facility]    Anesthesia Start: 0755 Anesthesia Stop: 1310    Procedure: Creation Bypass Graft Coronary ArteryX3(LIMA-LAD, SVG-PL, SVG-DIAG); EVH, LAUREN, Diagnosis:       Coronary artery disease involving native coronary artery of native heart, unspecified whether angina present      (Coronary artery disease involving native coronary artery of native heart, unspecified whether angina present [I25.10])    Surgeons: Amarilys Chung MD Responsible Provider: Venkata Hagen MD    Anesthesia Type: general ASA Status: 4            Anesthesia Type: general    Vitals Value Taken Time   /75 02/22/24 1310   Temp 36 02/22/24 1316   Pulse 68 02/22/24 1315   Resp 16 02/22/24 1315   SpO2 99 % 02/22/24 1315   Vitals shown include unvalidated device data.    Anesthesia Post Evaluation    Patient location during evaluation: ICU  Patient participation: waiting for patient participation  Level of consciousness: sedated  Pain management: adequate  Airway patency: patent  Cardiovascular status: acceptable  Respiratory status: acceptable, ventilator, intubated and ETT  Hydration status: acceptable  Postoperative Nausea and Vomiting: none      No notable events documented.

## 2024-02-22 NOTE — PROGRESS NOTES
"Vancomycin Dosing by Pharmacy- INITIAL    Sylvester Carvalho is a 78 y.o. year old male who Pharmacy has been consulted for vancomycin dosing for other surgical prophylaxis x 48 hours post op only . Based on the patient's indication and renal status this patient will be dosed based on a goal AUC of 400-600.     Renal function is currently stable.    Visit Vitals  /75   Pulse 66   Temp 36.2 °C (97.2 °F) (Temporal)   Resp 16        Lab Results   Component Value Date    CREATININE 0.89 02/20/2024    CREATININE 0.97 01/31/2024    CREATININE 0.93 07/11/2023    CREATININE 1.02 06/20/2022    CREATININE 0.89 06/03/2021    CREATININE 0.98 06/03/2020        Patient weight is No results found for: \"PTWEIGHT\"    No results found for: \"CULTURE\"     No intake/output data recorded.  [unfilled]    Lab Results   Component Value Date    PATIENTTEMP  02/22/2024      Comment:      NOTE: Patient Results are Not Corrected for Temperature    PATIENTTEMP  02/22/2024      Comment:      NOTE: Patient Results are Not Corrected for Temperature    PATIENTTEMP  02/22/2024      Comment:      NOTE: Patient Results are Not Corrected for Temperature          Assessment/Plan     Patient has already been given a loading dose of 1250 mg.  Will initiate vancomycin maintenance,  750 mg every 12 hours x 48 hours post op only    This dosing regimen is predicted by [employer] to result in the following pharmacokinetic parameters:  Surgical prophylaxis x 48 hours post op only    Follow-up level will be ordered on n/a at n/a unless clinically indicated sooner.  Will continue to monitor renal function daily while on vancomycin and order serum creatinine at least every 48 hours if not already ordered.  Follow for continued vancomycin needs, clinical response, and signs/symptoms of toxicity.       Madhuri Huntley, PharmD       "

## 2024-02-22 NOTE — ANESTHESIA PROCEDURE NOTES
Arterial Line:    Date/Time: 2/22/2024 6:45 AM    Staffing  Performed: attending   Authorized by: Venkata Hagen MD    Performed by: Venkata Hagen MD    An arterial line was placed. Procedure performed using surface landmarks.in the pre-op for the following indication(s): continuous blood pressure monitoring and blood sampling needed.    A 20 gauge (size), 10 cm (length), Arrow (type) catheter was placed into the Right brachial artery, secured by Tegaderm,   Seldinger technique used.  Events:  patient tolerated procedure well with no complications.

## 2024-02-23 ENCOUNTER — APPOINTMENT (OUTPATIENT)
Dept: RADIOLOGY | Facility: HOSPITAL | Age: 79
DRG: 236 | End: 2024-02-23
Payer: MEDICARE

## 2024-02-23 ENCOUNTER — APPOINTMENT (OUTPATIENT)
Dept: CARDIOLOGY | Facility: HOSPITAL | Age: 79
DRG: 236 | End: 2024-02-23
Payer: MEDICARE

## 2024-02-23 LAB
ALBUMIN SERPL BCP-MCNC: 3.7 G/DL (ref 3.4–5)
ANION GAP SERPL CALC-SCNC: 10 MMOL/L (ref 10–20)
BUN SERPL-MCNC: 21 MG/DL (ref 6–23)
CA-I BLD-SCNC: 1.17 MMOL/L (ref 1.1–1.33)
CALCIUM SERPL-MCNC: 8.4 MG/DL (ref 8.6–10.3)
CHLORIDE SERPL-SCNC: 108 MMOL/L (ref 98–107)
CO2 SERPL-SCNC: 26 MMOL/L (ref 21–32)
CREAT SERPL-MCNC: 1.01 MG/DL (ref 0.5–1.3)
EGFRCR SERPLBLD CKD-EPI 2021: 76 ML/MIN/1.73M*2
ERYTHROCYTE [DISTWIDTH] IN BLOOD BY AUTOMATED COUNT: 12.5 % (ref 11.5–14.5)
GLUCOSE BLD MANUAL STRIP-MCNC: 110 MG/DL (ref 74–99)
GLUCOSE BLD MANUAL STRIP-MCNC: 127 MG/DL (ref 74–99)
GLUCOSE BLD MANUAL STRIP-MCNC: 131 MG/DL (ref 74–99)
GLUCOSE BLD MANUAL STRIP-MCNC: 136 MG/DL (ref 74–99)
GLUCOSE BLD MANUAL STRIP-MCNC: 142 MG/DL (ref 74–99)
GLUCOSE BLD MANUAL STRIP-MCNC: 88 MG/DL (ref 74–99)
GLUCOSE SERPL-MCNC: 135 MG/DL (ref 74–99)
HCT VFR BLD AUTO: 29.3 % (ref 41–52)
HGB BLD-MCNC: 10.1 G/DL (ref 13.5–17.5)
MAGNESIUM SERPL-MCNC: 2.25 MG/DL (ref 1.6–2.4)
MCH RBC QN AUTO: 32.4 PG (ref 26–34)
MCHC RBC AUTO-ENTMCNC: 34.5 G/DL (ref 32–36)
MCV RBC AUTO: 94 FL (ref 80–100)
NRBC BLD-RTO: 0 /100 WBCS (ref 0–0)
PHOSPHATE SERPL-MCNC: 4.1 MG/DL (ref 2.5–4.9)
PLATELET # BLD AUTO: 114 X10*3/UL (ref 150–450)
POTASSIUM SERPL-SCNC: 3.8 MMOL/L (ref 3.5–5.3)
RBC # BLD AUTO: 3.12 X10*6/UL (ref 4.5–5.9)
SODIUM SERPL-SCNC: 140 MMOL/L (ref 136–145)
WBC # BLD AUTO: 10.1 X10*3/UL (ref 4.4–11.3)

## 2024-02-23 PROCEDURE — 82947 ASSAY GLUCOSE BLOOD QUANT: CPT | Mod: MUE

## 2024-02-23 PROCEDURE — 71045 X-RAY EXAM CHEST 1 VIEW: CPT | Performed by: RADIOLOGY

## 2024-02-23 PROCEDURE — 71045 X-RAY EXAM CHEST 1 VIEW: CPT | Performed by: STUDENT IN AN ORGANIZED HEALTH CARE EDUCATION/TRAINING PROGRAM

## 2024-02-23 PROCEDURE — 97165 OT EVAL LOW COMPLEX 30 MIN: CPT | Mod: GO

## 2024-02-23 PROCEDURE — 2500000001 HC RX 250 WO HCPCS SELF ADMINISTERED DRUGS (ALT 637 FOR MEDICARE OP)

## 2024-02-23 PROCEDURE — 93010 ELECTROCARDIOGRAM REPORT: CPT | Performed by: INTERNAL MEDICINE

## 2024-02-23 PROCEDURE — 2500000004 HC RX 250 GENERAL PHARMACY W/ HCPCS (ALT 636 FOR OP/ED)

## 2024-02-23 PROCEDURE — 71045 X-RAY EXAM CHEST 1 VIEW: CPT

## 2024-02-23 PROCEDURE — 97161 PT EVAL LOW COMPLEX 20 MIN: CPT | Mod: GP

## 2024-02-23 PROCEDURE — P9045 ALBUMIN (HUMAN), 5%, 250 ML: HCPCS | Mod: JZ

## 2024-02-23 PROCEDURE — 2500000004 HC RX 250 GENERAL PHARMACY W/ HCPCS (ALT 636 FOR OP/ED): Performed by: NURSE PRACTITIONER

## 2024-02-23 PROCEDURE — 83735 ASSAY OF MAGNESIUM: CPT | Performed by: NURSE PRACTITIONER

## 2024-02-23 PROCEDURE — 80069 RENAL FUNCTION PANEL: CPT | Performed by: NURSE PRACTITIONER

## 2024-02-23 PROCEDURE — 99291 CRITICAL CARE FIRST HOUR: CPT

## 2024-02-23 PROCEDURE — 2500000005 HC RX 250 GENERAL PHARMACY W/O HCPCS: Performed by: NURSE PRACTITIONER

## 2024-02-23 PROCEDURE — 85027 COMPLETE CBC AUTOMATED: CPT | Performed by: NURSE PRACTITIONER

## 2024-02-23 PROCEDURE — 2500000001 HC RX 250 WO HCPCS SELF ADMINISTERED DRUGS (ALT 637 FOR MEDICARE OP): Performed by: NURSE PRACTITIONER

## 2024-02-23 PROCEDURE — 99233 SBSQ HOSP IP/OBS HIGH 50: CPT | Performed by: NURSE PRACTITIONER

## 2024-02-23 PROCEDURE — 37799 UNLISTED PX VASCULAR SURGERY: CPT | Performed by: NURSE PRACTITIONER

## 2024-02-23 PROCEDURE — 2500000004 HC RX 250 GENERAL PHARMACY W/ HCPCS (ALT 636 FOR OP/ED): Mod: JZ

## 2024-02-23 PROCEDURE — C9113 INJ PANTOPRAZOLE SODIUM, VIA: HCPCS | Performed by: NURSE PRACTITIONER

## 2024-02-23 PROCEDURE — 2500000005 HC RX 250 GENERAL PHARMACY W/O HCPCS

## 2024-02-23 PROCEDURE — 2500000002 HC RX 250 W HCPCS SELF ADMINISTERED DRUGS (ALT 637 FOR MEDICARE OP, ALT 636 FOR OP/ED): Performed by: NURSE PRACTITIONER

## 2024-02-23 PROCEDURE — 93005 ELECTROCARDIOGRAM TRACING: CPT

## 2024-02-23 PROCEDURE — 2020000001 HC ICU ROOM DAILY

## 2024-02-23 PROCEDURE — 94640 AIRWAY INHALATION TREATMENT: CPT | Mod: MUE

## 2024-02-23 PROCEDURE — 82330 ASSAY OF CALCIUM: CPT | Performed by: NURSE PRACTITIONER

## 2024-02-23 RX ORDER — FUROSEMIDE 10 MG/ML
20 INJECTION INTRAMUSCULAR; INTRAVENOUS ONCE
Status: COMPLETED | OUTPATIENT
Start: 2024-02-23 | End: 2024-02-23

## 2024-02-23 RX ORDER — ALBUMIN HUMAN 50 G/1000ML
12.5 SOLUTION INTRAVENOUS ONCE
Status: COMPLETED | OUTPATIENT
Start: 2024-02-23 | End: 2024-02-23

## 2024-02-23 RX ORDER — INSULIN LISPRO 100 [IU]/ML
0-15 INJECTION, SOLUTION INTRAVENOUS; SUBCUTANEOUS
Status: DISCONTINUED | OUTPATIENT
Start: 2024-02-23 | End: 2024-02-28 | Stop reason: HOSPADM

## 2024-02-23 RX ORDER — COLCHICINE 0.6 MG/1
0.6 TABLET ORAL 2 TIMES DAILY
Status: DISCONTINUED | OUTPATIENT
Start: 2024-02-23 | End: 2024-02-25

## 2024-02-23 RX ORDER — METOPROLOL TARTRATE 25 MG/1
12.5 TABLET, FILM COATED ORAL 2 TIMES DAILY
Status: DISCONTINUED | OUTPATIENT
Start: 2024-02-23 | End: 2024-02-27

## 2024-02-23 RX ORDER — POTASSIUM CHLORIDE 20 MEQ/1
20 TABLET, EXTENDED RELEASE ORAL ONCE
Status: COMPLETED | OUTPATIENT
Start: 2024-02-23 | End: 2024-02-23

## 2024-02-23 RX ORDER — TIZANIDINE 4 MG/1
2 TABLET ORAL EVERY 8 HOURS PRN
Status: DISCONTINUED | OUTPATIENT
Start: 2024-02-23 | End: 2024-02-27

## 2024-02-23 RX ORDER — TEMAZEPAM 7.5 MG/1
7.5 CAPSULE ORAL NIGHTLY PRN
Status: DISCONTINUED | OUTPATIENT
Start: 2024-02-23 | End: 2024-02-24

## 2024-02-23 RX ORDER — GUAIFENESIN 600 MG/1
1200 TABLET, EXTENDED RELEASE ORAL 2 TIMES DAILY
Status: DISCONTINUED | OUTPATIENT
Start: 2024-02-23 | End: 2024-02-28 | Stop reason: HOSPADM

## 2024-02-23 RX ORDER — LIDOCAINE 560 MG/1
1 PATCH PERCUTANEOUS; TOPICAL; TRANSDERMAL DAILY
Status: DISCONTINUED | OUTPATIENT
Start: 2024-02-23 | End: 2024-02-28 | Stop reason: HOSPADM

## 2024-02-23 RX ORDER — ALBUMIN HUMAN 50 G/1000ML
SOLUTION INTRAVENOUS
Status: COMPLETED
Start: 2024-02-23 | End: 2024-02-23

## 2024-02-23 RX ORDER — GUAIFENESIN 600 MG/1
600 TABLET, EXTENDED RELEASE ORAL 2 TIMES DAILY PRN
Status: DISCONTINUED | OUTPATIENT
Start: 2024-02-23 | End: 2024-02-23

## 2024-02-23 RX ADMIN — IPRATROPIUM BROMIDE AND ALBUTEROL SULFATE 3 ML: 2.5; .5 SOLUTION RESPIRATORY (INHALATION) at 14:24

## 2024-02-23 RX ADMIN — POTASSIUM CHLORIDE 20 MEQ: 1500 TABLET, EXTENDED RELEASE ORAL at 10:43

## 2024-02-23 RX ADMIN — VANCOMYCIN HYDROCHLORIDE 750 MG: 750 INJECTION, SOLUTION INTRAVENOUS at 08:16

## 2024-02-23 RX ADMIN — ACETAMINOPHEN 650 MG: 325 TABLET ORAL at 16:53

## 2024-02-23 RX ADMIN — Medication 3 L/MIN: at 06:48

## 2024-02-23 RX ADMIN — POLYETHYLENE GLYCOL 3350 17 G: 17 POWDER, FOR SOLUTION ORAL at 20:47

## 2024-02-23 RX ADMIN — DOCUSATE SODIUM 100 MG: 100 CAPSULE, LIQUID FILLED ORAL at 20:46

## 2024-02-23 RX ADMIN — ATORVASTATIN CALCIUM 40 MG: 20 TABLET ORAL at 20:46

## 2024-02-23 RX ADMIN — OXYCODONE HYDROCHLORIDE 5 MG: 5 TABLET ORAL at 08:16

## 2024-02-23 RX ADMIN — METOPROLOL TARTRATE 12.5 MG: 25 TABLET, FILM COATED ORAL at 20:45

## 2024-02-23 RX ADMIN — ACETAMINOPHEN 650 MG: 325 TABLET ORAL at 13:05

## 2024-02-23 RX ADMIN — POLYETHYLENE GLYCOL 3350 17 G: 17 POWDER, FOR SOLUTION ORAL at 08:16

## 2024-02-23 RX ADMIN — LIDOCAINE 1 PATCH: 4 PATCH TOPICAL at 13:06

## 2024-02-23 RX ADMIN — POTASSIUM CHLORIDE 20 MEQ: 14.9 INJECTION, SOLUTION INTRAVENOUS at 06:09

## 2024-02-23 RX ADMIN — ACETAMINOPHEN 650 MG: 325 TABLET ORAL at 08:31

## 2024-02-23 RX ADMIN — COLCHICINE 0.6 MG: 0.6 TABLET, FILM COATED ORAL at 10:42

## 2024-02-23 RX ADMIN — IPRATROPIUM BROMIDE AND ALBUTEROL SULFATE 3 ML: 2.5; .5 SOLUTION RESPIRATORY (INHALATION) at 21:07

## 2024-02-23 RX ADMIN — ALBUMIN HUMAN 12.5 G: 50 SOLUTION INTRAVENOUS at 14:43

## 2024-02-23 RX ADMIN — METOPROLOL TARTRATE 12.5 MG: 25 TABLET, FILM COATED ORAL at 10:43

## 2024-02-23 RX ADMIN — ALBUMIN HUMAN 12.5 G: 0.05 INJECTION, SOLUTION INTRAVENOUS at 14:43

## 2024-02-23 RX ADMIN — INSULIN LISPRO 5 UNITS: 100 INJECTION, SOLUTION INTRAVENOUS; SUBCUTANEOUS at 06:13

## 2024-02-23 RX ADMIN — Medication 2 L/MIN: at 14:24

## 2024-02-23 RX ADMIN — Medication 2000 UNITS: at 08:16

## 2024-02-23 RX ADMIN — COLCHICINE 0.6 MG: 0.6 TABLET, FILM COATED ORAL at 20:48

## 2024-02-23 RX ADMIN — ACETAMINOPHEN 650 MG: 325 TABLET ORAL at 06:13

## 2024-02-23 RX ADMIN — GUAIFENESIN 1200 MG: 600 TABLET, EXTENDED RELEASE ORAL at 10:43

## 2024-02-23 RX ADMIN — ACETAMINOPHEN 650 MG: 325 TABLET ORAL at 20:45

## 2024-02-23 RX ADMIN — IPRATROPIUM BROMIDE AND ALBUTEROL SULFATE 3 ML: 2.5; .5 SOLUTION RESPIRATORY (INHALATION) at 06:48

## 2024-02-23 RX ADMIN — TIZANIDINE 2 MG: 4 TABLET ORAL at 13:05

## 2024-02-23 RX ADMIN — HYDROMORPHONE HYDROCHLORIDE 0.2 MG: 0.2 INJECTION, SOLUTION INTRAMUSCULAR; INTRAVENOUS; SUBCUTANEOUS at 06:20

## 2024-02-23 RX ADMIN — DOCUSATE SODIUM 100 MG: 100 CAPSULE, LIQUID FILLED ORAL at 08:16

## 2024-02-23 RX ADMIN — FUROSEMIDE 20 MG: 10 INJECTION, SOLUTION INTRAMUSCULAR; INTRAVENOUS at 10:48

## 2024-02-23 RX ADMIN — PANTOPRAZOLE SODIUM 40 MG: 40 INJECTION, POWDER, FOR SOLUTION INTRAVENOUS at 06:09

## 2024-02-23 RX ADMIN — ASPIRIN 81 MG: 81 TABLET, CHEWABLE ORAL at 08:16

## 2024-02-23 RX ADMIN — TEMAZEPAM 7.5 MG: 7.5 CAPSULE ORAL at 20:46

## 2024-02-23 RX ADMIN — DOCUSATE SODIUM 100 MG: 100 CAPSULE, LIQUID FILLED ORAL at 16:53

## 2024-02-23 RX ADMIN — GUAIFENESIN 1200 MG: 600 TABLET, EXTENDED RELEASE ORAL at 20:45

## 2024-02-23 RX ADMIN — VANCOMYCIN HYDROCHLORIDE 750 MG: 750 INJECTION, SOLUTION INTRAVENOUS at 20:47

## 2024-02-23 RX ADMIN — HYDROMORPHONE HYDROCHLORIDE 0.2 MG: 0.2 INJECTION, SOLUTION INTRAMUSCULAR; INTRAVENOUS; SUBCUTANEOUS at 01:02

## 2024-02-23 RX ADMIN — HYDROMORPHONE HYDROCHLORIDE 0.2 MG: 0.2 INJECTION, SOLUTION INTRAMUSCULAR; INTRAVENOUS; SUBCUTANEOUS at 03:02

## 2024-02-23 SDOH — SOCIAL STABILITY: SOCIAL INSECURITY: WERE YOU ABLE TO COMPLETE ALL THE BEHAVIORAL HEALTH SCREENINGS?: YES

## 2024-02-23 SDOH — SOCIAL STABILITY: SOCIAL INSECURITY: ARE THERE ANY APPARENT SIGNS OF INJURIES/BEHAVIORS THAT COULD BE RELATED TO ABUSE/NEGLECT?: NO

## 2024-02-23 SDOH — SOCIAL STABILITY: SOCIAL INSECURITY: HAVE YOU HAD THOUGHTS OF HARMING ANYONE ELSE?: NO

## 2024-02-23 SDOH — SOCIAL STABILITY: SOCIAL INSECURITY: DOES ANYONE TRY TO KEEP YOU FROM HAVING/CONTACTING OTHER FRIENDS OR DOING THINGS OUTSIDE YOUR HOME?: NO

## 2024-02-23 SDOH — SOCIAL STABILITY: SOCIAL INSECURITY: ABUSE: ADULT

## 2024-02-23 SDOH — SOCIAL STABILITY: SOCIAL INSECURITY: ARE YOU OR HAVE YOU BEEN THREATENED OR ABUSED PHYSICALLY, EMOTIONALLY, OR SEXUALLY BY ANYONE?: NO

## 2024-02-23 SDOH — SOCIAL STABILITY: SOCIAL INSECURITY: HAS ANYONE EVER THREATENED TO HURT YOUR FAMILY OR YOUR PETS?: NO

## 2024-02-23 SDOH — SOCIAL STABILITY: SOCIAL INSECURITY: DO YOU FEEL UNSAFE GOING BACK TO THE PLACE WHERE YOU ARE LIVING?: NO

## 2024-02-23 SDOH — SOCIAL STABILITY: SOCIAL INSECURITY: DO YOU FEEL ANYONE HAS EXPLOITED OR TAKEN ADVANTAGE OF YOU FINANCIALLY OR OF YOUR PERSONAL PROPERTY?: NO

## 2024-02-23 ASSESSMENT — COGNITIVE AND FUNCTIONAL STATUS - GENERAL
MOVING TO AND FROM BED TO CHAIR: A LOT
TOILETING: A LITTLE
MOVING FROM LYING ON BACK TO SITTING ON SIDE OF FLAT BED WITH BEDRAILS: A LITTLE
DRESSING REGULAR UPPER BODY CLOTHING: A LOT
MOVING FROM LYING ON BACK TO SITTING ON SIDE OF FLAT BED WITH BEDRAILS: A LITTLE
MOBILITY SCORE: 14
MOBILITY SCORE: 15
DRESSING REGULAR LOWER BODY CLOTHING: A LOT
TURNING FROM BACK TO SIDE WHILE IN FLAT BAD: A LOT
MOVING TO AND FROM BED TO CHAIR: A LITTLE
EATING MEALS: A LITTLE
MOBILITY SCORE: 16
CLIMB 3 TO 5 STEPS WITH RAILING: A LOT
WALKING IN HOSPITAL ROOM: A LOT
STANDING UP FROM CHAIR USING ARMS: A LITTLE
WALKING IN HOSPITAL ROOM: A LITTLE
DAILY ACTIVITIY SCORE: 16
HELP NEEDED FOR BATHING: A LOT
STANDING UP FROM CHAIR USING ARMS: A LITTLE
DAILY ACTIVITIY SCORE: 19
HELP NEEDED FOR BATHING: A LOT
PERSONAL GROOMING: A LOT
TURNING FROM BACK TO SIDE WHILE IN FLAT BAD: A LITTLE
PERSONAL GROOMING: A LITTLE
DRESSING REGULAR UPPER BODY CLOTHING: A LITTLE
DRESSING REGULAR LOWER BODY CLOTHING: A LITTLE
TOILETING: A LOT
HELP NEEDED FOR BATHING: A LITTLE
MOVING TO AND FROM BED TO CHAIR: A LITTLE
WALKING IN HOSPITAL ROOM: A LITTLE
DRESSING REGULAR LOWER BODY CLOTHING: A LITTLE
DAILY ACTIVITIY SCORE: 14
MOVING FROM LYING ON BACK TO SITTING ON SIDE OF FLAT BED WITH BEDRAILS: A LOT
PERSONAL GROOMING: A LITTLE
DRESSING REGULAR UPPER BODY CLOTHING: A LOT
CLIMB 3 TO 5 STEPS WITH RAILING: TOTAL
TOILETING: A LITTLE
CLIMB 3 TO 5 STEPS WITH RAILING: TOTAL
TURNING FROM BACK TO SIDE WHILE IN FLAT BAD: A LITTLE
STANDING UP FROM CHAIR USING ARMS: A LITTLE

## 2024-02-23 ASSESSMENT — PAIN SCALES - GENERAL
PAINLEVEL_OUTOF10: 2
PAINLEVEL_OUTOF10: 7
PAINLEVEL_OUTOF10: 2
PAINLEVEL_OUTOF10: 2
PAINLEVEL_OUTOF10: 5 - MODERATE PAIN
PAINLEVEL_OUTOF10: 2

## 2024-02-23 ASSESSMENT — LIFESTYLE VARIABLES
AUDIT-C TOTAL SCORE: 0
HOW OFTEN DO YOU HAVE A DRINK CONTAINING ALCOHOL: NEVER
HOW MANY STANDARD DRINKS CONTAINING ALCOHOL DO YOU HAVE ON A TYPICAL DAY: PATIENT DOES NOT DRINK
AUDIT-C TOTAL SCORE: 0
HOW OFTEN DO YOU HAVE 6 OR MORE DRINKS ON ONE OCCASION: NEVER
SUBSTANCE_ABUSE_PAST_12_MONTHS: NO
PRESCIPTION_ABUSE_PAST_12_MONTHS: NO
SKIP TO QUESTIONS 9-10: 1

## 2024-02-23 ASSESSMENT — PAIN - FUNCTIONAL ASSESSMENT
PAIN_FUNCTIONAL_ASSESSMENT: 0-10

## 2024-02-23 ASSESSMENT — PATIENT HEALTH QUESTIONNAIRE - PHQ9
2. FEELING DOWN, DEPRESSED OR HOPELESS: NOT AT ALL
SUM OF ALL RESPONSES TO PHQ9 QUESTIONS 1 & 2: 0
1. LITTLE INTEREST OR PLEASURE IN DOING THINGS: NOT AT ALL

## 2024-02-23 ASSESSMENT — PAIN DESCRIPTION - LOCATION: LOCATION: CHEST

## 2024-02-23 ASSESSMENT — PAIN DESCRIPTION - DESCRIPTORS
DESCRIPTORS: SORE
DESCRIPTORS: OTHER (COMMENT)

## 2024-02-23 ASSESSMENT — ACTIVITIES OF DAILY LIVING (ADL): BATHING_ASSISTANCE: MINIMAL

## 2024-02-23 NOTE — PROGRESS NOTES
Physical Therapy    Physical Therapy Evaluation    Patient Name: Sylvester Carvalho  MRN: [id number]  Today's Date: 2/23/2024   Time Calculation  Start Time: 1101  Stop Time: 1128  Time Calculation (min): 27 min    Assessment/Plan   PT Assessment  PT Assessment Results: Decreased strength, Decreased endurance, Impaired balance, Decreased mobility, Pain  Rehab Prognosis: Good  Evaluation/Treatment Tolerance: Patient limited by fatigue, Patient limited by pain  End of Session Communication: Bedside nurse  Assessment Comment: pt with decreased mobility/gait strength balance and endurance pt to benefit from skilled PT to address deficits and improve functional mobility .  End of Session Patient Position: Bed, 3 rail up, Alarm off, not on at start of session (RN and family present)  IP OR SWING BED PT PLAN  Inpatient or Swing Bed: Inpatient  PT Plan  Treatment/Interventions: Bed mobility, Transfer training, Gait training, Stair training, Balance training, Strengthening, Endurance training, Therapeutic exercise, Therapeutic activity, Home exercise program  PT Plan: Skilled PT  PT Frequency: 4 times per week  PT Discharge Recommendations: Low intensity level of continued care  PT Recommended Transfer Status: Assist x1  PT - OK to Discharge: Yes (once medically ready for discharge.)    Subjective     Current Problem:  Patient Active Problem List   Diagnosis    History of squamous cell carcinoma    Allergic rhinitis    Atypical nevus    Benign hypertension    Cataract, nuclear sclerotic, both eyes    Diverticulosis of colon    Enlarged prostate with lower urinary tract symptoms (LUTS)    Eustachian tube dysfunction    Exudative age-related macular degeneration of both eyes with active choroidal neovascularization (CMS/HCC)    GERD (gastroesophageal reflux disease)    HLD (hyperlipidemia)    Hordeolum externum (stye)    Hyperbilirubinemia    Inflamed skin tag    Internal hordeolum of left eye    Macular degeneration    Both eyes  affected by degenerative myopia with choroidal neovascularization    Osteoarthrosis    BMI 28.0-28.9,adult    Vitamin D deficiency    Pencilling of stools    Encounter for screening for malignant neoplasm of colon    Cyst of bone of right hand    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, bilateral    Elevated coronary artery calcium score    CAD (coronary artery disease)    Family history of ischemic heart disease    Former cigarette smoker    S/P coronary artery bypass graft x 3       General Visit Information:  General  Reason for Referral: S/P CABG  weakness /mobility  Referred By: OT/PT  Keon 2/22  Past Medical History Relevant to Rehab: HTN , OA, CAD, GERD, HLD macular degeneration , BPH CAD  Prior to Session Communication: Bedside nurse (cleared to see for therapy evals.)  Patient Position Received: Up in chair, Alarm off, not on at start of session (pt has tele , IV, two chest tubes, 02 2L .)  General Comment: pt is a 77 yo male came to the hospital on 2/22 for elective CABGx 3 . test/labs :  hgb 10.1, CXR neg acute small bibasilar  atelectasis .    Home Living:  Home Living  Home Living Comments: per pt lives with his wife in a two story home , has 2 steps to enter with no rails.  bed and bath on the 2nd floor has 14 steps with rail to reach .  pt has a walkin shower with no equipment    Prior Level of Function:  Prior Function Per Pt/Caregiver Report  Prior Function Comments: per pt  IND with all mobility adls and iadls before sx, no AD . no falls still drives . active reports he and his wife walk 6 miles daily.    Precautions:  Precautions  Medical Precautions: Fall precautions, Cardiac precautions, Oxygen therapy device and L/min  Post-Surgical Precautions: Move in the Tube    Vital Signs:  Vital Signs  Heart Rate:  (pre-  HR 68, /63, 02 97%  post - HR 67 , /69, 02 96%)  Objective     Pain:  Pain Assessment  Pain Assessment: 0-10  Pain Score: 2  Pain Type: Surgical pain  Pain  Location: Chest    Cognition:  Cognition  Overall Cognitive Status: Within Functional Limits  Orientation Level: Oriented X4    General Assessments:      Activity Tolerance  Endurance: Tolerates less than 10 min exercise with changes in vital signs     Strength  Strength Comments: BLE 4/5     Dynamic Sitting Balance  Dynamic Sitting-Comments: fair  Dynamic Standing Balance  Dynamic Standing-Comments: fair    Functional Assessments:     Bed Mobility  Bed Mobility: Yes  Bed Mobility 1  Bed Mobility 1: Sitting to supine  Level of Assistance 1: Moderate assistance  Bed Mobility Comments 1: mod A x 2 with cues for log roll  Transfers  Transfer: Yes  Transfer 1  Technique 1: Sit to stand, Stand to sit  Transfer Device 1: Walker (FWW)  Transfer Level of Assistance 1: Minimum assistance  Trials/Comments 1: cues to hold pillow and push up with legs.  Ambulation/Gait Training  Ambulation/Gait Training Performed: Yes  Ambulation/Gait Training 1  Surface 1: Level tile  Device 1:  (living good walker)  Assistance 1: Contact guard  Quality of Gait 1:  (slow gait speed.)  Comments/Distance (ft) 1: 100 ft with Living good  cga .          Extremity/Trunk Assessments:        RLE   RLE : Within Functional Limits  LLE   LLE : Within Functional Limits    Outcome Measures:  [facility] Basic Mobility  Turning from your back to your side while in a flat bed without using bedrails: A lot  Moving from lying on your back to sitting on the side of a flat bed without using bedrails: A lot  Moving to and from bed to chair (including a wheelchair): A little  Standing up from a chair using your arms (e.g. wheelchair or bedside chair): A little  To walk in hospital room: A little  Climbing 3-5 steps with railing: Total  Basic Mobility - Total Score: 14    Goals:  Encounter Problems       Encounter Problems (Active)       PT Problem       Pt will demonstrate mod I  with bed mobility to edge of bed.         Start:  02/23/24    Expected End:  03/08/24             Pt will demonstrate mod I  with sit to stand/chair transfers with no A/D          Start:  02/23/24    Expected End:  03/08/24            Pt will ambulate 200 feet with mod I  no A/D .         Start:  02/23/24    Expected End:  03/08/24            Pt to demo 14 steps with  rails with SBA        Start:  02/23/24    Expected End:  03/08/24                 Education Documentation  Precautions, taught by Elroy Alexis PT at 2/23/2024 11:53 AM.  Learner: Patient  Readiness: Acceptance  Method: Explanation  Response: Verbalizes Understanding    Mobility Training, taught by Elroy Alexis, PT at 2/23/2024 11:53 AM.  Learner: Patient  Readiness: Acceptance  Method: Explanation  Response: Verbalizes Understanding    Education Comments  No comments found.

## 2024-02-23 NOTE — PROGRESS NOTES
Occupational Therapy    Evaluation    Patient Name: Sylvester Carvalho  MRN: [id number]  Today's Date: 2/23/2024  Time Calculation  Start Time: 1102  Stop Time: 1130  Time Calculation (min): 28 min        Assessment:  Prognosis: Good  Evaluation/Treatment Tolerance: Patient tolerated treatment well  End of Session Communication: Bedside nurse  End of Session Patient Position: Bed, 3 rail up, Alarm off, not on at start of session (RN in room.)  OT Assessment Results: Decreased ADL status  Prognosis: Good  Evaluation/Treatment Tolerance: Patient tolerated treatment well  Plan:  Treatment Interventions: ADL retraining, Functional transfer training, Compensatory technique education, Patient/family training  OT Frequency: 2 times per week  OT Discharge Recommendations: Low intensity level of continued care  OT - OK to Discharge: Yes (when medically stable/cleared.)    Subjective   Current Problem:  1. S/P coronary artery bypass graft x 3        2. Former cigarette smoker        3. Family history of ischemic heart disease        4. Elevated coronary artery calcium score        5. 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 Cyst of bone of right hand        7. Encounter for screening for malignant neoplasm of colon        8. Pencilling of stools        9. Vitamin D deficiency        10. BMI 28.0-28.9,adult        11. Both eyes affected by degenerative myopia with choroidal neovascularization        12. Inflamed skin tag        13. Hyperbilirubinemia        14. Exudative age-related macular degeneration of both eyes with active choroidal neovascularization (CMS/HCC)        15. Diverticulosis of colon        16. Cataract, nuclear sclerotic, both eyes        17. Benign hypertension        18. Atypical nevus        19. History of squamous cell carcinoma        20. Coronary artery disease involving native coronary artery of native heart, unspecified whether angina present  Anesthesia Intraoperative  Transesophageal Echocardiogram    Anesthesia Intraoperative Transesophageal Echocardiogram        General:  General  Reason for Referral: ADL impairment s/p cardiac surgery  Referred By: OT/PT Marcio 2/22  Past Medical History Relevant to Rehab: Macular degeneration, BPH, CAD, GERD, HTN, HLD, OA  Family/Caregiver Present: Yes  Caregiver Feedback: wife and family members present  Prior to Session Communication: Bedside nurse  Patient Position Received: Up in chair, Alarm off, not on at start of session  General Comment: Pt. is 79 y/o  male s/p CABG x3 with Dr. Rosen 2/22. CXR: small bibasilar atelectasis.  Precautions:  Medical Precautions: Fall precautions  Post-Surgical Precautions: Move in the Tube  Precautions Comment: IV; 2 chest tubes, 2L 02, aranda, telemetry  Vital Signs:  Heart Rate:  (70 pre, 66 post)  SpO2:  (2L, 97% pre, 96% post)  BP:  (102/63 pre, 115/69 post.)  Pain:  Pain Assessment  Pain Assessment: 0-10  Pain Score: 2  Pain Type: Surgical pain  Pain Location:  (chest and back)    Objective   Cognition:  Overall Cognitive Status: Within Functional Limits  Orientation Level: Oriented X4     Home Living:  Home Living Comments: Pt. lives with his wife in two story house, has 2 MARY GRACE without HR. Bed/bath on 2nd floor with walk in shower. 1/2 bath on main floor.  Prior Function:  Prior Function Comments: Pt. was independent with ADLs/IADLs prior to surgery. No AD use. Owns [employer]. No falls. Drives.    ADL:  Eating Assistance: Independent  Grooming Assistance: Stand by  Bathing Assistance: Minimal  UE Dressing Assistance: Stand by  LE Dressing Assistance: Minimal  Toileting Assistance with Device: Minimal  Activity Tolerance:  Endurance: Decreased tolerance for upright activites  Bed Mobility/Transfers: Bed Mobility  Bed Mobility: Yes  Bed Mobility 1  Bed Mobility 1: Sitting to supine  Level of Assistance 1: Moderate assistance  Bed Mobility Comments 1: x2 assist for log roll; assist to bring LEs into bed and  trunk placement.    Transfers  Transfer: Yes  Transfer 1  Technique 1: Sit to stand  Transfer Device 1:  (Amy device.)  Transfer Level of Assistance 1: Minimum assistance  Trials/Comments 1: VCs for safe transfer technique; pt. holding heart pillow; able to stand with Min A without use of UEs.      Ambulation/Gait Training:  Ambulation/Gait Training  Ambulation/Gait Training Performed:  (~100 feet with amy device. CGA for safety. Vitals stable. No LOB - pt. with 1-2 standing rest breaks)  Sitting Balance:  Static Sitting Balance  Static Sitting-Level of Assistance: Close supervision  Standing Balance:  Dynamic Standing Balance  Dynamic Standing-Comments: Fair +     Strength:  Strength Comments: BUEs not formally assessed, however WFL during functional activities    Coordination:  Coordination Comment: WFL   Hand Function:  Gross Grasp: Functional  Extremities: EDGAR HERNÁNDEZ :  (did not formally assess, however WFL AROM during functional activities and evaluation.) and IRAIDA KHOURY:  (did not formally assess, however WFL AROM during functional activities and evaluation.)      Outcome Measures:[facility] Daily Activity  Putting on and taking off regular lower body clothing: A little  Bathing (including washing, rinsing, drying): A little  Putting on and taking off regular upper body clothing: A little  Toileting, which includes using toilet, bedpan or urinal: A little  Taking care of personal grooming such as brushing teeth: A little  Eating Meals: None  Daily Activity - Total Score: 19      Education Documentation  Body Mechanics, taught by Terri Funes OT at 2/23/2024  1:55 PM.  Learner: Family, Patient  Readiness: Eager  Method: Explanation, Demonstration, Handout  Response: Verbalizes Understanding, Needs Reinforcement  Comment: elizabeth RATLIFF, amy device, safety with evaluation and mobility    Precautions, taught by Terri Funes OT at 2/23/2024  1:55 PM.  Learner: Family,  Patient  Readiness: Eager  Method: Explanation, Demonstration, Handout  Response: Verbalizes Understanding, Needs Reinforcement  Comment: MITT, trasnfers, amy device, safety with evaluation and mobility    ADL Training, taught by Terri Funes OT at 2/23/2024  1:55 PM.  Learner: Family, Patient  Readiness: Eager  Method: Explanation, Demonstration, Handout  Response: Verbalizes Understanding, Needs Reinforcement  Comment: MITT, trasnfers, amy device, safety with evaluation and mobility    IP EDUCATION:  Education  Individual(s) Educated: Patient, Spouse (family members)  Education Provided: Diagnosis & Precautions, Fall precautons, POC discussed and agreed upon, Risk and benefits of OT discussed with patient or other  Patient Response to Education: Patient/Caregiver Verbalized Understanding of Information  Education Comment: handout provided for MITT    Goals:  Encounter Problems       Encounter Problems (Active)       OT Goals       Mod I vs. indep for all functional transfers  (Progressing)       Start:  02/23/24    Expected End:  03/08/24            Indep verbal and physical carryover of MITT during ADLs and functional activities (Progressing)       Start:  02/23/24    Expected End:  03/08/24            Indep with LB dressing.  (Progressing)       Start:  02/23/24    Expected End:  03/08/24            Mod I vs. indep functional mobility household distances for ADLs/IADLs  (Progressing)       Start:  02/23/24    Expected End:  03/08/24

## 2024-02-23 NOTE — PROGRESS NOTES
First post op day - 3 vessel CABG.. Extubated and progressing well. Is aware of  [facility] and [facility].. Will need to await PT/Ot recommendations as well.

## 2024-02-23 NOTE — PROGRESS NOTES
[facility] Critical Care Medicine       Date:  2/23/2024  Patient:  Sylvester Carvalho  YOB: 1945  MRN:  [id number]   Admit Date:  2/22/2024  ========================================================================================================    No chief complaint on file.        History of Present Illness:  Sylvester Carvalho is a 78 y.o. year old male patient with Past Medical History of  GERD, BPH, HLD, HTN, CAD who underwent CABG x3 with Dr. Chung on 2/22/2024. He was admitted to ICU post-operatively for recovery, intubated, on low dose norepinephrine, CTx3, RIJ PAC.        Interval ICU Events:  2/22: POD #0, admitted to SICU 12   2/23: POD #1, no overnight events. Adequate pain control, CT x3 intact and draining, OOB in the chair. No infusions, VSS, on 2L NC.    Medical History:  Past Medical History:   Diagnosis Date    Arthritis     BPH (benign prostatic hyperplasia)     Cataract     Coronary artery disease     COVID-19     VACCINATED    Diverticulosis     Exudative age-related macular degeneration, left eye, stage unspecified (CMS/[facility]) 01/31/2018    Age-related macular degeneration, wet, left eye    GERD (gastroesophageal reflux disease)     History of squamous cell carcinoma of skin     Hyperlipidemia     Hypertension     Personal history of other endocrine, nutritional and metabolic disease 01/24/2018    History of high cholesterol    Wears glasses      Past Surgical History:   Procedure Laterality Date    CARDIAC CATHETERIZATION N/A 02/02/2024    Procedure: Left Heart Cath, With LV;  Surgeon: Pedro Zelaya MD;  Location: [facility];  Service: Cardiovascular;  Laterality: N/A;  Ranexa 500 mg on admit    COLONOSCOPY      TOTAL HIP ARTHROPLASTY Left 01/24/2018    Hip Replacement     Medications Prior to Admission   Medication Sig Dispense Refill Last Dose    amLODIPine (Norvasc) 2.5 mg tablet Take 1 tablet (2.5 mg) by mouth once daily. 90 tablet 1 Past Week    aspirin 81 mg chewable  tablet Chew 1 tablet (81 mg) once daily. 30 tablet 11 2024 at 0800    atorvastatin (Lipitor) 40 mg tablet Take 1 tablet (40 mg) by mouth once daily. 90 tablet 3 2024 at 2100    benazepriL-hydrochlorothiazide (Lotensin HCT) 20-25 mg tablet Take 1 tablet by mouth once daily. 90 tablet 1 2024 at 0800    cholecalciferol (Vitamin D-3) 50 MCG (2000 UT) tablet Take 1 tablet (2,000 Units) by mouth once daily.   2024 at 08/00    mupirocin (Bactroban) 2 % ointment Apply topically 2 times a day. TWICE ADAY  SINCE 24 at 2100    mv-min/FA/vit K/lutein/zeaxant (PRESERVISION AREDS 2 PLUS MV ORAL) Take by mouth.   2024 at 0800    pantoprazole (ProtoNix) 40 mg EC tablet Take 1 tablet (40 mg) by mouth once daily in the morning. Take before meals. Do not crush, chew, or split. 90 tablet 1 2024 at 0800    nitroglycerin (Nitrostat) 0.4 mg SL tablet Place 1 tablet (0.4 mg) under the tongue every 5 minutes if needed for chest pain. May repeat every 5 minutes for up to 3 doses. (Patient not taking: Reported on 2024) 25 tablet 3 Not Taking     Penicillins  Social History     Tobacco Use    Smoking status: Former     Packs/day: 1.00     Years: 36.00     Additional pack years: 0.00     Total pack years: 36.00     Types: Cigarettes     Start date: 1964     Quit date: 2000     Years since quittin.1    Smokeless tobacco: Never    Tobacco comments:     I quit smoking many many times over the years so i didnt smoke continually for 36 years notes above   Vaping Use    Vaping Use: Never used   Substance Use Topics    Alcohol use: Yes     Alcohol/week: 2.0 standard drinks of alcohol     Types: 2 Standard drinks or equivalent per week     Comment: Occasional and always socially    Drug use: Never     Family History   Problem Relation Name Age of Onset    Other (stroke syndrome) Mother [place]     Stroke Mother Marcia     Lung cancer Father      No Known Problems Other          Review of Systems:  14 point review of systems was completed and negative except for those specially mention in my HPI    Physical Exam:    Heart Rate:  [62-78]   Temp:  [36.7 °C (98.1 °F)-38 °C (100.4 °F)]   Resp:  [15-33]   BP: ()/(68-78)   Weight:  [80.6 kg (177 lb 11.1 oz)-85.8 kg (189 lb 2.5 oz)]   SpO2:  [92 %-100 %]     Physical Exam  Vitals and nursing note reviewed.   Constitutional:       General: He is awake.      Appearance: Normal appearance.      Interventions: Nasal cannula in place.   HENT:      Mouth/Throat:      Mouth: Mucous membranes are dry.      Pharynx: Oropharynx is clear.   Eyes:      Extraocular Movements: Extraocular movements intact.      Pupils: Pupils are equal, round, and reactive to light.   Cardiovascular:      Rate and Rhythm: Normal rate and regular rhythm.      Pulses: Normal pulses.      Heart sounds: S1 normal and S2 normal.      Friction rub present.   Pulmonary:      Effort: Pulmonary effort is normal.      Breath sounds: Normal breath sounds.   Chest:      Comments: Bilateral pleural CT, Mediastinal CT  Abdominal:      General: Bowel sounds are normal. There is distension.      Palpations: Abdomen is soft.      Tenderness: There is no abdominal tenderness.   Genitourinary:     Comments: Angulo- clear/yellow  Musculoskeletal:      Right lower leg: No edema.      Left lower leg: No edema.   Skin:     General: Skin is warm and dry.      Capillary Refill: Capillary refill takes less than 2 seconds.      Comments: Left groin/left calf dressing CDI, Midsternal incision CDI   Neurological:      General: No focal deficit present.      Mental Status: He is alert and oriented to person, place, and time. Mental status is at baseline.         Objective:    I have reviewed all medications, laboratory results, and imaging pertinent for today's encounter    Assessment/Plan:    I am currently managing this critically ill patient for the following problems:    Neuro/Psych/Pain  Ctrl/Sedation:  Post-op pain  -Tylenol, OXY PRN, lidocaine patches, zanaflex  -Delirium precautions    Respiratory/ENT:  Post-Op Respiratory Insufficiency   Titrate O2 to maintain SpO2 >92%  Continue IS, EzPAP, pulm hygiene  Daily CXR  CXR 2/23: Bilateral pleural effusions and LLL opacity  Mucinex BID  Maintain bilateral pleural CT, mediastinal CT per CTS    Cardiovascular:  Triple Vessel CAD s/p CABG x 3 LIMA-LAD, SVG-PL, SVG-DIAG  Hx HTN HLD  Acute pericarditis  VVI @ 50bpm, intrinsic NSR 60-70s  Begin ASA, statin, metoprolol  Daily EKG  Colchicine initiated, BID treatment for 10 days  Begin gentle diuresis, Lasix 20mg IV today    GI:  GERD  Continue PPI   Tolerating Cardiac diet  Bowel regimen, no BM, +BS, passing flatus    Renal/Volume Status (Intra & Extravascular):  Hx BPH  Monitor I/O, can remove aranda today  Daily RFP, Mg  Replete electrolytes to maintain K >4.0 and Mg >2.0   Diuresis?    Endocrine  Goal BG <180  POCT glucose  SSI as needed    Infectious Disease:  No active issues    Heme/Onc:  No active issues  Monitor for bleeding: CT, incisions, etc  Daily CBC    MSK:  Ambulate TID, Activity as tolerated    Ethics/Code Status:  Full Code    :  DVT Prophylaxis: SQH once CT removed  GI Prophylaxis: PPI  Bowel Regimen: Scheduled/PRN  Diet: Cardiac  CVC: [place] removed, RIJ [place]  [place]: Removed today  Aranda: Removed today  Restraints: No  Dispo: ICU, POD #1    [facility] Cardiothoracic Surgery/ICU Quality Check      New Onset Organ Failure (VIMAL, Shock, ALI etc): No  >2 Vasopressors/Inotropes (Levophed > 0.1mcg/kg/min + Vasopressin) for more than 8 hours: No  Ongoing Blood Product Transfusion: No  Sepsis/New Infection: No  Delirium: No  Readmission to ICU: No  Family/Patient Concerns: No    Critical Care Time:  45 minutes spent in preparing to see patient (I.e. review of medical records), evaluation of diagnostics (I.e. labs, imaging, etc.), documentation, discussing plan of care with patient/  family/ caregiver, and/ or coordination of care with multidisciplinary team. Time does not include completion of procedure time.      This note was prepared using voice recognition software. The details of this note are correct and have been reviewed, and corrected to the best of my ability. Some grammatical areas may persist related to the Dragon software.     Isha Herbert APRN-CNP CCRN  Pulmonology & Critical Care Medicine  [facility]

## 2024-02-23 NOTE — PROGRESS NOTES
Sylvester Carvalho is a 78 y.o. male on day 1 of admission presenting with CAD (coronary artery disease).    Subjective   Sylvester Carvalho is a 78 y.o. male with PMHx of HTN, HLD, and nicotine dependence (quit smoking 20+ years ago but occasionally uses nicotine gum). At a routine PCP visit, coronary CT was recommended for screening. This revealed a calcium score of 4402 and patient was referred to Dr. Zelaya for further evaluation. He subsequently presented to [facility] 2/2/24 and underwent elective coronary angiogram revealing two vessel CAD including 90% stenosis of proximal LAD affecting the large diagonal. Surgical revascularization recommended but pt was stable to discharge and return electively. Pre-op testing was completed prior to discharging and pt was seen by Dr. Chung the following week.     On February 22, 2024 pt returned to [facility] and underwent CABGx3 with LIMA-LAD, SVG-PL, SVG-Diag; Endoscopic vein harvest; intraoperative LAUREN. EBL 250mL. CPB 78min. XC 70min. Pt transfused Platelets intraoperatively. He tolerated the procedure well and transferred to SICU in critical but stable condition intubated and sedated on low dose Norepinephrine. NEPI was weaned off with volume resuscitation. Pt extubated to nasal canula that evening.     POD#1: Pt is currently afebrile; had Tmax 38 ~8PM. Normotensive not requiring pressor support. Maintaining SR. BB resumed. Noted to have significant pericardial rub, EKG with mild ST elevation in inferolateral leads c/w pericarditis; Colchicine x10 days initiated. Pt endorses moderate pain that is decently controlled with current pain regimen. Chest tubes with tapering serosanguinous output; will re-evaluate this afternoon and remove if output <30mL/hr. Pt is tolerating clear liquids and will advance diet to cardiac diet. FB +3.8L and urine output tapering this morning; will begin gentle post op diuresis with Lasix 20mg IVP x1. Pt's main complaint is fatigue due to sleeping poorly;  "Restoril added. Increase activity as tolerated; therapy consulted. There were no acute overnight events. Will remain in ICU today for close monitoring.     Objective     Physical Exam  Vitals and nursing note reviewed.   Constitutional:       General: He is not in acute distress.     Appearance: Normal appearance. He is normal weight.   HENT:      Head: Normocephalic and atraumatic.      Mouth/Throat:      Mouth: Mucous membranes are moist.      Pharynx: Oropharynx is clear.   Eyes:      Extraocular Movements: Extraocular movements intact.      Pupils: Pupils are equal, round, and reactive to light.   Cardiovascular:      Rate and Rhythm: Normal rate and regular rhythm.      Pulses: Normal pulses.      Heart sounds: No murmur heard.     Friction rub present.      Comments: Significant pericardial rub   Ventricular epicardial wires intact; has not required pacing   Pulmonary:      Comments: Shallow inspiratory volume 2/2 pain.   Diminished mid lung to base bilaterally with faint basilar crackles (R>L)   Chest tubes with thin serosanguinous output. No air leak.     Abdominal:      General: Bowel sounds are normal.      Palpations: Abdomen is soft.      Comments: Protuberant but soft.    Musculoskeletal:         General: Normal range of motion.      Comments: Generalized post op weakness    Skin:     General: Skin is warm and dry.   Neurological:      General: No focal deficit present.      Mental Status: He is alert and oriented to person, place, and time.   Psychiatric:         Mood and Affect: Mood normal.         Behavior: Behavior normal.         Last Recorded Vitals  Blood pressure 102/63, pulse 66, temperature 37.7 °C (99.9 °F), temperature source Core, resp. rate 19, height 1.676 m (5' 6\"), weight 85.8 kg (189 lb 2.5 oz), SpO2 97 %.  Intake/Output last 3 Shifts:  I/O last 3 completed shifts:  In: 6604.7 (77 mL/kg) [P.O.:650; I.V.:1306.7 (15.2 mL/kg); Blood:656; IV Piggyback:3992]  Out: 2525 (29.4 mL/kg) " [Urine:1785 (0.6 mL/kg/hr); Chest Tube:740]  Weight: 85.8 kg     Relevant Results  Scheduled medications  acetaminophen, 650 mg, oral, q4h  [Held by provider] amLODIPine, 2.5 mg, oral, Daily  aspirin, 81 mg, oral, Daily  atorvastatin, 40 mg, oral, Daily  cholecalciferol, 2,000 Units, oral, Daily  colchicine, 0.6 mg, oral, BID  docusate sodium, 100 mg, oral, TID  guaiFENesin, 1,200 mg, oral, BID  insulin lispro, 0-15 Units, subcutaneous, Before meals & nightly  ipratropium-albuteroL, 3 mL, nebulization, q8h  lidocaine, 1 patch, transdermal, q24h  metoprolol tartrate, 12.5 mg, oral, BID  pantoprazole, 40 mg, oral, Daily before breakfast  polyethylene glycol, 17 g, oral, BID  vancomycin, 750 mg, intravenous, q12h      Continuous medications     PRN medications  PRN medications: calcium gluconate, calcium gluconate, dextrose **OR** glucagon, HYDROmorphone, ipratropium-albuteroL, magnesium sulfate, magnesium sulfate, naloxone, oxyCODONE, oxygen, potassium chloride, potassium chloride, potassium chloride CR, potassium chloride CR, temazepam    Results for orders placed or performed during the hospital encounter of 02/22/24 (from the past 24 hour(s))   Blood Gas Arterial Full Panel Unsolicited   Result Value Ref Range    POCT pH, Arterial 7.36 (L) 7.38 - 7.42 pH    POCT pCO2, Arterial 45 (H) 38 - 42 mm Hg    POCT pO2, Arterial 189 (H) 85 - 95 mm Hg    POCT SO2, Arterial 99 94 - 100 %    POCT Oxy Hemoglobin, Arterial 98.0 94.0 - 98.0 %    POCT Hematocrit Calculated, Arterial 33.0 (L) 41.0 - 52.0 %    POCT Sodium, Arterial 136 136 - 145 mmol/L    POCT Potassium, Arterial 4.6 3.5 - 5.3 mmol/L    POCT Chloride, Arterial 105 98 - 107 mmol/L    POCT Ionized Calcium, Arterial 1.20 1.10 - 1.33 mmol/L    POCT Glucose, Arterial 171 (H) 74 - 99 mg/dL    POCT Lactate, Arterial 1.6 0.4 - 2.0 mmol/L    POCT Base Excess, Arterial -0.3 -2.0 - 3.0 mmol/L    POCT HCO3 Calculated, Arterial 25.4 22.0 - 26.0 mmol/L    POCT Hemoglobin, Arterial  11.0 (L) 13.5 - 17.5 g/dL    POCT Anion Gap, Arterial 10 10 - 25 mmo/L    Patient Temperature      FiO2 100 %   POCT GLUCOSE   Result Value Ref Range    POCT Glucose 135 (H) 74 - 99 mg/dL   Blood Gas Arterial Full Panel   Result Value Ref Range    POCT pH, Arterial 7.37 (L) 7.38 - 7.42 pH    POCT pCO2, Arterial 43 (H) 38 - 42 mm Hg    POCT pO2, Arterial 114 (H) 85 - 95 mm Hg    POCT SO2, Arterial 98 94 - 100 %    POCT Oxy Hemoglobin, Arterial 97.0 94.0 - 98.0 %    POCT Hematocrit Calculated, Arterial 37.0 (L) 41.0 - 52.0 %    POCT Sodium, Arterial 139 136 - 145 mmol/L    POCT Potassium, Arterial 4.2 3.5 - 5.3 mmol/L    POCT Chloride, Arterial      POCT Ionized Calcium, Arterial 1.14 1.10 - 1.33 mmol/L    POCT Glucose, Arterial 146 (H) 74 - 99 mg/dL    POCT Lactate, Arterial 1.4 0.4 - 2.0 mmol/L    POCT Base Excess, Arterial -0.5 -2.0 - 3.0 mmol/L    POCT HCO3 Calculated, Arterial 24.9 22.0 - 26.0 mmol/L    POCT Hemoglobin, Arterial 12.2 (L) 13.5 - 17.5 g/dL    POCT Anion Gap, Arterial      Patient Temperature      FiO2 50 %   Magnesium   Result Value Ref Range    Magnesium 2.76 (H) 1.60 - 2.40 mg/dL   Coagulation Screen   Result Value Ref Range    Protime 14.8 (H) 9.8 - 12.8 seconds    INR 1.3 (H) 0.9 - 1.1    aPTT 29 27 - 38 seconds   Fibrinogen   Result Value Ref Range    Fibrinogen 166 (L) 200 - 400 mg/dL   Renal Function Panel   Result Value Ref Range    Glucose 139 (H) 74 - 99 mg/dL    Sodium 140 136 - 145 mmol/L    Potassium 4.0 3.5 - 5.3 mmol/L    Chloride 109 (H) 98 - 107 mmol/L    Bicarbonate 24 21 - 32 mmol/L    Anion Gap 11 10 - 20 mmol/L    Urea Nitrogen 13 6 - 23 mg/dL    Creatinine 0.87 0.50 - 1.30 mg/dL    eGFR 88 >60 mL/min/1.73m*2    Calcium 8.2 (L) 8.6 - 10.3 mg/dL    Phosphorus 3.2 2.5 - 4.9 mg/dL    Albumin 3.5 3.4 - 5.0 g/dL   CBC   Result Value Ref Range    WBC 15.5 (H) 4.4 - 11.3 x10*3/uL    nRBC 0.0 0.0 - 0.0 /100 WBCs    RBC 3.82 (L) 4.50 - 5.90 x10*6/uL    Hemoglobin 12.2 (L) 13.5 - 17.5  g/dL    Hematocrit 35.3 (L) 41.0 - 52.0 %    MCV 92 80 - 100 fL    MCH 31.9 26.0 - 34.0 pg    MCHC 34.6 32.0 - 36.0 g/dL    RDW 12.1 11.5 - 14.5 %    Platelets 148 (L) 150 - 450 x10*3/uL   POCT GLUCOSE   Result Value Ref Range    POCT Glucose 163 (H) 74 - 99 mg/dL   POCT GLUCOSE   Result Value Ref Range    POCT Glucose 166 (H) 74 - 99 mg/dL   ECG 12 Lead   Result Value Ref Range    Ventricular Rate 67 BPM    Atrial Rate 67 BPM    WA Interval 218 ms    QRS Duration 128 ms    QT Interval 452 ms    QTC Calculation(Bazett) 477 ms    P Axis 56 degrees    R Axis 45 degrees    T Axis 12 degrees    QRS Count 11 beats    Q Onset 227 ms    P Onset 118 ms    P Offset 174 ms    T Offset 453 ms    QTC Fredericia 469 ms   POCT GLUCOSE   Result Value Ref Range    POCT Glucose 173 (H) 74 - 99 mg/dL   POCT GLUCOSE   Result Value Ref Range    POCT Glucose 136 (H) 74 - 99 mg/dL   Calcium, Ionized   Result Value Ref Range    POCT Calcium, Ionized 1.17 1.1 - 1.33 mmol/L   Magnesium   Result Value Ref Range    Magnesium 2.25 1.60 - 2.40 mg/dL   CBC   Result Value Ref Range    WBC 10.1 4.4 - 11.3 x10*3/uL    nRBC 0.0 0.0 - 0.0 /100 WBCs    RBC 3.12 (L) 4.50 - 5.90 x10*6/uL    Hemoglobin 10.1 (L) 13.5 - 17.5 g/dL    Hematocrit 29.3 (L) 41.0 - 52.0 %    MCV 94 80 - 100 fL    MCH 32.4 26.0 - 34.0 pg    MCHC 34.5 32.0 - 36.0 g/dL    RDW 12.5 11.5 - 14.5 %    Platelets 114 (L) 150 - 450 x10*3/uL   Renal Function Panel   Result Value Ref Range    Glucose 135 (H) 74 - 99 mg/dL    Sodium 140 136 - 145 mmol/L    Potassium 3.8 3.5 - 5.3 mmol/L    Chloride 108 (H) 98 - 107 mmol/L    Bicarbonate 26 21 - 32 mmol/L    Anion Gap 10 10 - 20 mmol/L    Urea Nitrogen 21 6 - 23 mg/dL    Creatinine 1.01 0.50 - 1.30 mg/dL    eGFR 76 >60 mL/min/1.73m*2    Calcium 8.4 (L) 8.6 - 10.3 mg/dL    Phosphorus 4.1 2.5 - 4.9 mg/dL    Albumin 3.7 3.4 - 5.0 g/dL   POCT GLUCOSE   Result Value Ref Range    POCT Glucose 142 (H) 74 - 99 mg/dL   ECG 12 lead   Result Value Ref  Range    Ventricular Rate 72 BPM    Atrial Rate 72 BPM    FL Interval 192 ms    QRS Duration 120 ms    QT Interval 406 ms    QTC Calculation(Bazett) 444 ms    P Axis 34 degrees    R Axis -1 degrees    T Axis -2 degrees    QRS Count 11 beats    Q Onset 229 ms    P Onset 133 ms    P Offset 182 ms    T Offset 432 ms    QTC Fredericia 431 ms   POCT GLUCOSE   Result Value Ref Range    POCT Glucose 88 74 - 99 mg/dL   POCT GLUCOSE   Result Value Ref Range    POCT Glucose 127 (H) 74 - 99 mg/dL     XR chest 1 view    Result Date: 2/23/2024  Interpreted By:  Lilian Ray, STUDY: XR CHEST 1 VIEW; 2/23/2024 9:24 am   INDICATION: Signs/Symptoms:Post-op   COMPARISON: Radiographs 03/22/2024   ACCESSION NUMBER(S): [id number]   ORDERING CLINICIAN: RANDALL ROSAS   TECHNIQUE: Single frontal view of the chest performed.   FINDINGS: LINES AND DEVICES: Removed NG and endotracheal tubes. Stable right IJ PA catheter. Stable bilateral chest tubes and mediastinal drain.   LUNGS: Stable trace bilateral pleural effusions and left basilar airspace opacities. No new focal consolidation, pulmonary edema or pneumothorax.   CARDIOMEDIASTINAL SILHOUETTE: The cardiomediastinal silhouette is stable.       Removal of NG and endotracheal tubes. No significant change otherwise with trace bilateral pleural effusions and left lower lung airspace opacities. No pneumothorax.   MACRO None   Signed by: Lilian Ray 2/23/2024 10:59 AM Dictation workstation:   [id number]    ECG 12 lead    Result Date: 2/23/2024  Normal sinus rhythm RSR' or QR pattern in V1 suggests right ventricular conduction delay Cannot rule out Inferior infarct , age undetermined Abnormal ECG When compared with ECG of 22-FEB-2024 13:37, (unconfirmed) RSR' pattern in V1 has replaced Right bundle branch block    ECG 12 Lead    Result Date: 2/22/2024  Sinus rhythm with 1st degree AV block Right bundle branch block Abnormal ECG When compared with ECG of 02-FEB-2024 06:30, Right bundle  branch block is now Present    XR chest 1 view    Result Date: 2/22/2024  Interpreted By:  Arnoldo Queen, STUDY: XR CHEST 1 VIEW;  2/22/2024 1:27 pm   INDICATION: Signs/Symptoms:Post-op.   COMPARISON: 7:49 a.m.   ACCESSION NUMBER(S): [id number]   ORDERING CLINICIAN: RANDALL ROSAS   TECHNIQUE: A portable radiograph of the chest is performed.   FINDINGS: Postoperative changes are now identified in the form midline sternotomy wires vascular clips. An endotracheal tube is in place with the tip 2.5 cm above the fercho. A [place]-Jun catheter is again noted with the tip of the catheter overlying the right pulmonary artery. Bilateral chest tubes are identified in the lower thorax. A nasogastric tube projects into the stomach.   The heart is mildly enlarged. There is mild central vascular prominence. A small component of bibasilar atelectasis is suspected. There is no sizable pleural effusion or pneumothorax. The osseous structures are diffusely demineralized.       The tubes and lines are well positioned.   Mild cardiomegaly and central vascular prominence.   Small component of bibasilar atelectasis.   Continued clinical and radiographic follow-up is recommended.   Signed by: Arnoldo Queen 2/22/2024 1:43 PM Dictation workstation:   [id number]    XR chest 1 view    Result Date: 2/22/2024  Interpreted By:  Frederic Patel, STUDY: XR CHEST 1 VIEW;  2/22/2024 7:55 am   INDICATION: Signs/Symptoms:post lines cabg.   COMPARISON: None.   ACCESSION NUMBER(S): [id number]   ORDERING CLINICIAN: CLIFFORD MATA   FINDINGS: CARDIOMEDIASTINAL SILHOUETTE AND VASCULATURE:   Cardiac size:  Within normal limits.   Aortic shadow:  Within normal limits.   Mediastinal contours: Within normal limits.   Pulmonary vasculature:  The central vasculature is unremarkable   LUNGS: Several calcified pleural plaques best seen the left mid lung and hemidiaphragms probably from asbestosis exposure. Considering this lungs otherwise appear clear  without discrete infiltrate.   ABDOMEN AND OTHER FINDINGS: A swans Jun catheter extends to the right main pulmonary artery. No complication such as pneumothorax.   BONES: No acute osseous changes.       1.  No active cardiopulmonary disease.   Signed by: Frederic Patel 2/22/2024 10:08 AM Dictation workstation:   [id number]         Assessment/Plan   Principal Problem:    CAD (coronary artery disease)  Active Problems:    S/P coronary artery bypass graft x 3    CAD  CABGx3 with LIMA-LAD, SVG-PL, SVG-Diag; Endoscopic vein harvest; intraoperative LAUREN.  -Discussed with Dr. Chung  -appreciate assistance of ICU team  -Remove swan and arterial line  -Remove chest tubes once output <30mL/hr. Epicardial wires to remain in place; may insulate  -Remove Angulo this afternoon; continue strict I&O  -ASA and Statin daily  -Resume beta blockade: Metoprolol 12.5mg BID. Titrate as hemodynamics allow  -Begin gentle diuresis; Lasix 20mg IVP x1  -Keep K>4 and Mag>2  -Scheduled Tylenol x48H, Oxycodone 5mg Q4H PRN, IV Dilaudid for severe breakthrough  -Bowel regimen of Miralax and Colace  -Advance to cardiac diet; SSI TID-AC and HS; Goal BG<180  -Protonix for GI prophylaxis   -Aggressive pulmonary hygiene; wean supplemental oxygen as able for SpO2>90%  -Increase activity as tolerated; therapy following   -SCD's and ambulation for DVT prophylaxis; sub-q heparin once chest tubes removed  -CBC, BMP, Ca, Mag, CXR, and EKG in AM    HTN; HLD  Lipid panel: Chol 164, HDL 36.9, LDL 89,   -Statin therapy  -Metoprolol 12.5mg BID  -optimize medical management as hemodynamics allow    Anemia due to blood loss; Acquired thrombocytopenia  Acute post operative anemia   Acquired thrombocytopenia 2/2 consumption and dilution; Given platelets x1 intraoperatively.   POD#1: Hgb 10.1 and Platelets 114k  -monitor for signs of active bleeding  -CBC in AM    I spent 60 minutes in the professional and overall care of this patient.      Keila Buckley,  APRN-CNP

## 2024-02-24 ENCOUNTER — APPOINTMENT (OUTPATIENT)
Dept: CARDIOLOGY | Facility: HOSPITAL | Age: 79
DRG: 236 | End: 2024-02-24
Payer: MEDICARE

## 2024-02-24 ENCOUNTER — APPOINTMENT (OUTPATIENT)
Dept: RADIOLOGY | Facility: HOSPITAL | Age: 79
DRG: 236 | End: 2024-02-24
Payer: MEDICARE

## 2024-02-24 LAB
ALBUMIN SERPL BCP-MCNC: 3.6 G/DL (ref 3.4–5)
ANION GAP SERPL CALC-SCNC: 10 MMOL/L (ref 10–20)
BUN SERPL-MCNC: 27 MG/DL (ref 6–23)
CA-I BLD-SCNC: 1.22 MMOL/L (ref 1.1–1.33)
CALCIUM SERPL-MCNC: 8.8 MG/DL (ref 8.6–10.3)
CHLORIDE SERPL-SCNC: 102 MMOL/L (ref 98–107)
CO2 SERPL-SCNC: 26 MMOL/L (ref 21–32)
CREAT SERPL-MCNC: 1 MG/DL (ref 0.5–1.3)
EGFRCR SERPLBLD CKD-EPI 2021: 77 ML/MIN/1.73M*2
ERYTHROCYTE [DISTWIDTH] IN BLOOD BY AUTOMATED COUNT: 12.9 % (ref 11.5–14.5)
GLUCOSE BLD MANUAL STRIP-MCNC: 105 MG/DL (ref 74–99)
GLUCOSE BLD MANUAL STRIP-MCNC: 110 MG/DL (ref 74–99)
GLUCOSE BLD MANUAL STRIP-MCNC: 120 MG/DL (ref 74–99)
GLUCOSE BLD MANUAL STRIP-MCNC: 130 MG/DL (ref 74–99)
GLUCOSE SERPL-MCNC: 119 MG/DL (ref 74–99)
HCT VFR BLD AUTO: 26.1 % (ref 41–52)
HGB BLD-MCNC: 8.8 G/DL (ref 13.5–17.5)
MAGNESIUM SERPL-MCNC: 2.34 MG/DL (ref 1.6–2.4)
MCH RBC QN AUTO: 31.9 PG (ref 26–34)
MCHC RBC AUTO-ENTMCNC: 33.7 G/DL (ref 32–36)
MCV RBC AUTO: 95 FL (ref 80–100)
NRBC BLD-RTO: 0 /100 WBCS (ref 0–0)
PHOSPHATE SERPL-MCNC: 2.6 MG/DL (ref 2.5–4.9)
PLATELET # BLD AUTO: 83 X10*3/UL (ref 150–450)
POTASSIUM SERPL-SCNC: 4.1 MMOL/L (ref 3.5–5.3)
RBC # BLD AUTO: 2.76 X10*6/UL (ref 4.5–5.9)
SODIUM SERPL-SCNC: 134 MMOL/L (ref 136–145)
WBC # BLD AUTO: 9.1 X10*3/UL (ref 4.4–11.3)

## 2024-02-24 PROCEDURE — 2020000001 HC ICU ROOM DAILY

## 2024-02-24 PROCEDURE — 80069 RENAL FUNCTION PANEL: CPT | Performed by: NURSE PRACTITIONER

## 2024-02-24 PROCEDURE — 2500000005 HC RX 250 GENERAL PHARMACY W/O HCPCS

## 2024-02-24 PROCEDURE — 2500000001 HC RX 250 WO HCPCS SELF ADMINISTERED DRUGS (ALT 637 FOR MEDICARE OP)

## 2024-02-24 PROCEDURE — 2500000002 HC RX 250 W HCPCS SELF ADMINISTERED DRUGS (ALT 637 FOR MEDICARE OP, ALT 636 FOR OP/ED): Mod: MUE | Performed by: NURSE PRACTITIONER

## 2024-02-24 PROCEDURE — 2500000001 HC RX 250 WO HCPCS SELF ADMINISTERED DRUGS (ALT 637 FOR MEDICARE OP): Performed by: NURSE PRACTITIONER

## 2024-02-24 PROCEDURE — 37799 UNLISTED PX VASCULAR SURGERY: CPT | Performed by: NURSE PRACTITIONER

## 2024-02-24 PROCEDURE — 2500000004 HC RX 250 GENERAL PHARMACY W/ HCPCS (ALT 636 FOR OP/ED)

## 2024-02-24 PROCEDURE — 71045 X-RAY EXAM CHEST 1 VIEW: CPT

## 2024-02-24 PROCEDURE — 85027 COMPLETE CBC AUTOMATED: CPT | Performed by: NURSE PRACTITIONER

## 2024-02-24 PROCEDURE — 99291 CRITICAL CARE FIRST HOUR: CPT

## 2024-02-24 PROCEDURE — 82947 ASSAY GLUCOSE BLOOD QUANT: CPT

## 2024-02-24 PROCEDURE — 83735 ASSAY OF MAGNESIUM: CPT | Performed by: NURSE PRACTITIONER

## 2024-02-24 PROCEDURE — 99232 SBSQ HOSP IP/OBS MODERATE 35: CPT | Performed by: NURSE PRACTITIONER

## 2024-02-24 PROCEDURE — 93005 ELECTROCARDIOGRAM TRACING: CPT

## 2024-02-24 PROCEDURE — 2500000002 HC RX 250 W HCPCS SELF ADMINISTERED DRUGS (ALT 637 FOR MEDICARE OP, ALT 636 FOR OP/ED)

## 2024-02-24 PROCEDURE — 71045 X-RAY EXAM CHEST 1 VIEW: CPT | Performed by: RADIOLOGY

## 2024-02-24 PROCEDURE — 2500000004 HC RX 250 GENERAL PHARMACY W/ HCPCS (ALT 636 FOR OP/ED): Performed by: NURSE PRACTITIONER

## 2024-02-24 PROCEDURE — 82330 ASSAY OF CALCIUM: CPT | Performed by: NURSE PRACTITIONER

## 2024-02-24 PROCEDURE — 93010 ELECTROCARDIOGRAM REPORT: CPT | Performed by: INTERNAL MEDICINE

## 2024-02-24 PROCEDURE — 94640 AIRWAY INHALATION TREATMENT: CPT | Mod: MUE | Performed by: SURGERY

## 2024-02-24 PROCEDURE — 94640 AIRWAY INHALATION TREATMENT: CPT | Mod: MUE

## 2024-02-24 PROCEDURE — 2500000002 HC RX 250 W HCPCS SELF ADMINISTERED DRUGS (ALT 637 FOR MEDICARE OP, ALT 636 FOR OP/ED): Performed by: NURSE PRACTITIONER

## 2024-02-24 RX ORDER — POTASSIUM CHLORIDE 20 MEQ/1
20 TABLET, EXTENDED RELEASE ORAL ONCE
Status: COMPLETED | OUTPATIENT
Start: 2024-02-24 | End: 2024-02-24

## 2024-02-24 RX ORDER — LANOLIN ALCOHOL/MO/W.PET/CERES
400 CREAM (GRAM) TOPICAL DAILY
Status: DISCONTINUED | OUTPATIENT
Start: 2024-02-24 | End: 2024-02-28 | Stop reason: HOSPADM

## 2024-02-24 RX ORDER — FUROSEMIDE 10 MG/ML
20 INJECTION INTRAMUSCULAR; INTRAVENOUS ONCE
Status: COMPLETED | OUTPATIENT
Start: 2024-02-24 | End: 2024-02-24

## 2024-02-24 RX ORDER — IRON POLYSACCHARIDE COMPLEX 150 MG
150 CAPSULE ORAL DAILY
Status: DISCONTINUED | OUTPATIENT
Start: 2024-02-24 | End: 2024-02-28 | Stop reason: HOSPADM

## 2024-02-24 RX ORDER — POTASSIUM CHLORIDE 750 MG/1
10 TABLET, FILM COATED, EXTENDED RELEASE ORAL ONCE
Status: COMPLETED | OUTPATIENT
Start: 2024-02-24 | End: 2024-02-24

## 2024-02-24 RX ADMIN — PANTOPRAZOLE SODIUM 40 MG: 40 TABLET, DELAYED RELEASE ORAL at 05:11

## 2024-02-24 RX ADMIN — FUROSEMIDE 20 MG: 10 INJECTION, SOLUTION INTRAMUSCULAR; INTRAVENOUS at 10:45

## 2024-02-24 RX ADMIN — AMIODARONE HYDROCHLORIDE 0.5 MG/MIN: 1.8 INJECTION, SOLUTION INTRAVENOUS at 23:36

## 2024-02-24 RX ADMIN — COLCHICINE 0.6 MG: 0.6 TABLET, FILM COATED ORAL at 08:35

## 2024-02-24 RX ADMIN — IPRATROPIUM BROMIDE AND ALBUTEROL SULFATE 3 ML: 2.5; .5 SOLUTION RESPIRATORY (INHALATION) at 15:32

## 2024-02-24 RX ADMIN — METOPROLOL TARTRATE 12.5 MG: 25 TABLET, FILM COATED ORAL at 20:31

## 2024-02-24 RX ADMIN — ACETAMINOPHEN 650 MG: 325 TABLET ORAL at 17:30

## 2024-02-24 RX ADMIN — GUAIFENESIN 1200 MG: 600 TABLET, EXTENDED RELEASE ORAL at 08:34

## 2024-02-24 RX ADMIN — ASPIRIN 81 MG: 81 TABLET, CHEWABLE ORAL at 08:35

## 2024-02-24 RX ADMIN — IPRATROPIUM BROMIDE AND ALBUTEROL SULFATE 3 ML: 2.5; .5 SOLUTION RESPIRATORY (INHALATION) at 06:23

## 2024-02-24 RX ADMIN — DOCUSATE SODIUM 100 MG: 100 CAPSULE, LIQUID FILLED ORAL at 20:30

## 2024-02-24 RX ADMIN — POTASSIUM CHLORIDE 20 MEQ: 1500 TABLET, EXTENDED RELEASE ORAL at 11:45

## 2024-02-24 RX ADMIN — COLCHICINE 0.6 MG: 0.6 TABLET, FILM COATED ORAL at 20:30

## 2024-02-24 RX ADMIN — IPRATROPIUM BROMIDE AND ALBUTEROL SULFATE 3 ML: 2.5; .5 SOLUTION RESPIRATORY (INHALATION) at 20:37

## 2024-02-24 RX ADMIN — POTASSIUM CHLORIDE 10 MEQ: 750 TABLET, EXTENDED RELEASE ORAL at 20:31

## 2024-02-24 RX ADMIN — AMIODARONE HYDROCHLORIDE 1 MG/MIN: 1.8 INJECTION, SOLUTION INTRAVENOUS at 12:17

## 2024-02-24 RX ADMIN — METOPROLOL TARTRATE 12.5 MG: 25 TABLET, FILM COATED ORAL at 08:34

## 2024-02-24 RX ADMIN — VANCOMYCIN HYDROCHLORIDE 750 MG: 750 INJECTION, SOLUTION INTRAVENOUS at 08:16

## 2024-02-24 RX ADMIN — ACETAMINOPHEN 650 MG: 325 TABLET ORAL at 08:34

## 2024-02-24 RX ADMIN — MAGNESIUM OXIDE 400 MG (241.3 MG MAGNESIUM) TABLET 400 MG: TABLET at 11:45

## 2024-02-24 RX ADMIN — POLYSACCHARIDE-IRON COMPLEX 150 MG: 150 CAPSULE ORAL at 13:36

## 2024-02-24 RX ADMIN — POLYETHYLENE GLYCOL 3350 17 G: 17 POWDER, FOR SOLUTION ORAL at 20:30

## 2024-02-24 RX ADMIN — LIDOCAINE 1 PATCH: 4 PATCH TOPICAL at 08:34

## 2024-02-24 RX ADMIN — GUAIFENESIN 1200 MG: 600 TABLET, EXTENDED RELEASE ORAL at 20:30

## 2024-02-24 RX ADMIN — ACETAMINOPHEN 650 MG: 325 TABLET ORAL at 20:31

## 2024-02-24 RX ADMIN — Medication 2000 UNITS: at 08:35

## 2024-02-24 RX ADMIN — AMIODARONE HYDROCHLORIDE 1 MG/MIN: 1.8 INJECTION, SOLUTION INTRAVENOUS at 08:26

## 2024-02-24 RX ADMIN — FUROSEMIDE 20 MG: 10 INJECTION, SOLUTION INTRAMUSCULAR; INTRAVENOUS at 20:30

## 2024-02-24 RX ADMIN — ACETAMINOPHEN 650 MG: 325 TABLET ORAL at 01:23

## 2024-02-24 RX ADMIN — ATORVASTATIN CALCIUM 40 MG: 20 TABLET ORAL at 20:30

## 2024-02-24 RX ADMIN — ACETAMINOPHEN 650 MG: 325 TABLET ORAL at 13:36

## 2024-02-24 ASSESSMENT — PAIN SCALES - GENERAL
PAINLEVEL_OUTOF10: 0 - NO PAIN
PAINLEVEL_OUTOF10: 2
PAINLEVEL_OUTOF10: 1
PAINLEVEL_OUTOF10: 0 - NO PAIN
PAINLEVEL_OUTOF10: 0 - NO PAIN

## 2024-02-24 ASSESSMENT — PAIN DESCRIPTION - LOCATION: LOCATION: STERNUM

## 2024-02-24 ASSESSMENT — PAIN - FUNCTIONAL ASSESSMENT
PAIN_FUNCTIONAL_ASSESSMENT: 0-10

## 2024-02-24 NOTE — PROGRESS NOTES
[facility] Critical Care Medicine       Date:  2/24/2024  Patient:  Sylvester Carvalho  YOB: 1945  MRN:  [id number]   Admit Date:  2/22/2024  ========================================================================================================    No chief complaint on file.        History of Present Illness:  Sylvester Carvalho is a 78 y.o. year old male patient with Past Medical History of  GERD, BPH, HLD, HTN, CAD who underwent CABG x3 with Dr. Chung on 2/22/2024. He was admitted to ICU post-operatively for recovery, intubated, on low dose norepinephrine, CTx3, RIJ PAC.        Interval ICU Events:  2/22: POD #0, admitted to SICU 12   2/23: POD #1, no overnight events. Adequate pain control, CT x3 intact and draining, OOB in the chair. No infusions, VSS, on 2L NC.  2/24: EKG confirmed Afib RVR, 123 bpm. Amiodarone gtt initiated, asymptomatic HDS.    Medical History:  Past Medical History:   Diagnosis Date    Arthritis     BPH (benign prostatic hyperplasia)     Cataract     Coronary artery disease     COVID-19     VACCINATED    Diverticulosis     Exudative age-related macular degeneration, left eye, stage unspecified (CMS/Formerly [facility]) 01/31/2018    Age-related macular degeneration, wet, left eye    GERD (gastroesophageal reflux disease)     History of squamous cell carcinoma of skin     Hyperlipidemia     Hypertension     Personal history of other endocrine, nutritional and metabolic disease 01/24/2018    History of high cholesterol    Wears glasses      Past Surgical History:   Procedure Laterality Date    CARDIAC CATHETERIZATION N/A 02/02/2024    Procedure: Left Heart Cath, With LV;  Surgeon: Pedro Zelaya MD;  Location: [facility];  Service: Cardiovascular;  Laterality: N/A;  Ranexa 500 mg on admit    COLONOSCOPY      TOTAL HIP ARTHROPLASTY Left 01/24/2018    Hip Replacement     Medications Prior to Admission   Medication Sig Dispense Refill Last Dose    amLODIPine (Norvasc) 2.5 mg tablet Take 1  tablet (2.5 mg) by mouth once daily. 90 tablet 1 Past Week    aspirin 81 mg chewable tablet Chew 1 tablet (81 mg) once daily. 30 tablet 11 2024 at 0800    atorvastatin (Lipitor) 40 mg tablet Take 1 tablet (40 mg) by mouth once daily. 90 tablet 3 2024 at 2100    benazepriL-hydrochlorothiazide (Lotensin HCT) 20-25 mg tablet Take 1 tablet by mouth once daily. 90 tablet 1 2024 at 0800    cholecalciferol (Vitamin D-3) 50 MCG (2000 UT) tablet Take 1 tablet (2,000 Units) by mouth once daily.   2024 at 08/00    mupirocin (Bactroban) 2 % ointment Apply topically 2 times a day. TWICE ADAY  SINCE 24 at 2100    mv-min/FA/vit K/lutein/zeaxant (PRESERVISION AREDS 2 PLUS MV ORAL) Take by mouth.   2024 at 0800    pantoprazole (ProtoNix) 40 mg EC tablet Take 1 tablet (40 mg) by mouth once daily in the morning. Take before meals. Do not crush, chew, or split. 90 tablet 1 2024 at 0800    nitroglycerin (Nitrostat) 0.4 mg SL tablet Place 1 tablet (0.4 mg) under the tongue every 5 minutes if needed for chest pain. May repeat every 5 minutes for up to 3 doses. (Patient not taking: Reported on 2024) 25 tablet 3 Not Taking     Penicillins  Social History     Tobacco Use    Smoking status: Former     Packs/day: 1.00     Years: 36.00     Additional pack years: 0.00     Total pack years: 36.00     Types: Cigarettes     Start date: 1964     Quit date: 2000     Years since quittin.1    Smokeless tobacco: Never    Tobacco comments:     I quit smoking many many times over the years so i didnt smoke continually for 36 years notes above   Vaping Use    Vaping Use: Never used   Substance Use Topics    Alcohol use: Yes     Alcohol/week: 2.0 standard drinks of alcohol     Types: 2 Standard drinks or equivalent per week     Comment: Occasional and always socially    Drug use: Never     Family History   Problem Relation Name Age of Onset    Other (stroke syndrome) Mother Marcia      Stroke Mother Marcia     Lung cancer Father      No Known Problems Other         Review of Systems:  14 point review of systems was completed and negative except for those specially mention in my HPI    Physical Exam:    Heart Rate:  []   Temp:  [36.3 °C (97.3 °F)-37.7 °C (99.9 °F)]   Resp:  [0-28]   BP: ()/(48-84)   Weight:  [85.9 kg (189 lb 6 oz)]   SpO2:  [93 %-100 %]     Physical Exam  Vitals and nursing note reviewed.   Constitutional:       General: He is awake.      Appearance: Normal appearance.      Interventions: Nasal cannula in place.   HENT:      Mouth/Throat:      Mouth: Mucous membranes are dry.      Pharynx: Oropharynx is clear.   Eyes:      Extraocular Movements: Extraocular movements intact.      Pupils: Pupils are equal, round, and reactive to light.   Cardiovascular:      Rate and Rhythm: Normal rate and regular rhythm.      Pulses: Normal pulses.      Heart sounds: S1 normal and S2 normal.      No friction rub.   Pulmonary:      Effort: Pulmonary effort is normal.      Breath sounds: Normal breath sounds.   Abdominal:      General: Bowel sounds are normal.      Palpations: Abdomen is soft.      Tenderness: There is no abdominal tenderness.   Musculoskeletal:      Right lower leg: No edema.      Left lower leg: No edema.   Skin:     General: Skin is warm and dry.      Capillary Refill: Capillary refill takes less than 2 seconds.      Comments: Left groin/left calf dressing CDI, Midsternal incision CDI   Neurological:      General: No focal deficit present.      Mental Status: He is alert and oriented to person, place, and time. Mental status is at baseline.         Objective:    I have reviewed all medications, laboratory results, and imaging pertinent for today's encounter    Assessment/Plan:    I am currently managing this critically ill patient for the following problems:    Neuro/Psych/Pain Ctrl/Sedation:  Post-op pain resolved  -Tylenol, OXY PRN, lidocaine patches,  zanaflex  -Delirium precautions    Respiratory/ENT:  Post-Op Respiratory Insufficiency   Titrate O2 to maintain SpO2 >92%  Continue IS, EzPAP, pulm hygiene  Daily CXR  CXR 2/23: Bilateral pleural effusions and LLL opacity  Mucinex BID  CT removed 2/23    Cardiovascular:  Triple Vessel CAD s/p CABG x 3 LIMA-LAD, SVG-PL, SVG-DIAG  Hx HTN HLD  Acute pericarditis  Afib RVR 2/24  ASA, statin, metoprolol  Daily EKG  Colchicine initiated, BID treatment for 10 days  Repeat Lasix 20mg IV today  Amio gtt continue through 2/25, switch to 200mg PO BID    GI:  GERD  Continue PPI   Tolerating Cardiac diet  Bowel regimen, no BM, +BS, passing flatus    Renal/Volume Status (Intra & Extravascular):  Hx BPH  Monitor I/O  Daily RFP, Mg  Replete electrolytes to maintain K >4.0 and Mg >2.0     Endocrine  Goal BG <180  POCT glucose  SSI as needed    Infectious Disease:  No active issues    Heme/Onc:  No active issues  Monitor for bleeding, H/H drop today suspected dilutional   Daily CBC    MSK:  Ambulate TID, Activity as tolerated    Ethics/Code Status:  Full Code    :  DVT Prophylaxis: Begin SQH tomorrow per CTS  GI Prophylaxis: PPI  Bowel Regimen: Scheduled/PRN  Diet: Cardiac  CVC:  RIMISSAEL Willis  [place]: No  [place]: No  Restraints: No  Dispo: ICU, POD #2    [facility] Cardiothoracic Surgery/ICU Quality Check      New Onset Organ Failure (VIMAL, Shock, ALI etc): No  >2 Vasopressors/Inotropes (Levophed > 0.1mcg/kg/min + Vasopressin) for more than 8 hours: No  Ongoing Blood Product Transfusion: No  Sepsis/New Infection: No  Delirium: No  Readmission to ICU: No  Family/Patient Concerns: No    Critical Care Time:  45 minutes spent in preparing to see patient (I.e. review of medical records), evaluation of diagnostics (I.e. labs, imaging, etc.), documentation, discussing plan of care with patient/ family/ caregiver, and/ or coordination of care with multidisciplinary team. Time does not include completion of procedure time.      This  note was prepared using voice recognition software. The details of this note are correct and have been reviewed, and corrected to the best of my ability. Some grammatical areas may persist related to the Dragon software.     Isha Herbert APRN-CNP CCRN  Pulmonology & Critical Care Medicine  [facility]

## 2024-02-24 NOTE — PROGRESS NOTES
Sylvester Carvalho is a 78 y.o. male on day 2 of admission presenting with CAD (coronary artery disease).    Subjective   POD#2: This morning ~8AM patient went into AFIB RVR with rates in 120s. He was asymptomatic denying palpitations, SOB, and dizziness. Remained HDS with /84. Given Amio bolus and then started on drip per protocol. Pt converted back to sinus rhythm ~9AM. At time of exam, patient is afebrile and HDS. Maintaining SR in 70s on Amio drip at 1mg/min. Will continue Amio drip per protocol today and plan to transition to PO Amio tomorrow. No indication for OAC at this time. Pt is maintaining adequate SpO2 on 3L NC; spirometry volumes improving. Pain well controlled. Tolerating diet without nausea and has moved bowels. Became hypotensive after diuresis yesterday requiring Albumin. LOS FB now +6L; will resume diuresis today. Pt is ambulating around unit with walker and tolerating well. Plan to downgrade to telemetry tomorrow if maintaining sinus on oral Amiodarone. There were no acute overnight events.     Objective     Physical Exam  Vitals and nursing note reviewed.   Constitutional:       Appearance: Normal appearance. He is normal weight.   HENT:      Head: Normocephalic and atraumatic.      Right Ear: External ear normal.      Left Ear: External ear normal.      Nose: Nose normal.      Mouth/Throat:      Mouth: Mucous membranes are moist.      Pharynx: Oropharynx is clear.   Eyes:      Extraocular Movements: Extraocular movements intact.      Pupils: Pupils are equal, round, and reactive to light.   Cardiovascular:      Rate and Rhythm: Normal rate and regular rhythm.      Pulses: Normal pulses.      Heart sounds: Normal heart sounds.      Comments: Brief episode of AFIB RVR this AM but has converted back to sinus with Amiodarone.   Pericardial rub is no longer appreciated  Ventricular epicardial wires intact; has not required pacing.   Pulmonary:      Effort: Pulmonary effort is normal.      Comments:  "Improving inspiratory volume  Diminished mid lung to base but no adventitious sounds appreciated  Chest tubes removed 2/23; repeat XR without evidence of PTX.   Abdominal:      Comments: Softly distended. Hypoactive BS but moving bowels.    Musculoskeletal:         General: Normal range of motion.      Cervical back: Normal range of motion and neck supple.      Comments: Generalized post op weakness. Ambulating around entire unit with walker and stand by assist.   Trivial lower extremity edema    Skin:     General: Skin is warm and dry.   Neurological:      Mental Status: He is alert.         Last Recorded Vitals  Blood pressure 103/65, pulse 62, temperature 36.5 °C (97.7 °F), temperature source Temporal, resp. rate 24, height 1.676 m (5' 6\"), weight 85.9 kg (189 lb 6 oz), SpO2 97 %.  Intake/Output last 3 Shifts:  I/O last 3 completed shifts:  In: 5491 (63.9 mL/kg) [P.O.:1450; I.V.:3240.7 (37.7 mL/kg); Blood:250; IV Piggyback:550.3]  Out: 2782 (32.4 mL/kg) [Urine:1770 (0.6 mL/kg/hr); Chest Tube:1012]  Weight: 85.9 kg     Relevant Results  Scheduled medications  acetaminophen, 650 mg, oral, q4h  [Held by provider] amLODIPine, 2.5 mg, oral, Daily  aspirin, 81 mg, oral, Daily  atorvastatin, 40 mg, oral, Daily  cholecalciferol, 2,000 Units, oral, Daily  colchicine, 0.6 mg, oral, BID  docusate sodium, 100 mg, oral, TID  guaiFENesin, 1,200 mg, oral, BID  insulin lispro, 0-15 Units, subcutaneous, Before meals & nightly  ipratropium-albuteroL, 3 mL, nebulization, q8h  iron polysaccharides, 150 mg, oral, Daily  lidocaine, 1 patch, transdermal, q24h  lidocaine, 1 patch, transdermal, Daily  magnesium oxide, 400 mg, oral, Daily  metoprolol tartrate, 12.5 mg, oral, BID  pantoprazole, 40 mg, oral, Daily before breakfast  polyethylene glycol, 17 g, oral, BID      Continuous medications  amiodarone, 1 mg/min, Last Rate: 1 mg/min (02/24/24 1217)      PRN medications  PRN medications: calcium gluconate, calcium gluconate, dextrose " **OR** glucagon, ipratropium-albuteroL, magnesium sulfate, magnesium sulfate, naloxone, oxyCODONE, oxygen, potassium chloride, potassium chloride, potassium chloride CR, potassium chloride CR, tiZANidine    Results for orders placed or performed during the hospital encounter of 02/22/24 (from the past 24 hour(s))   POCT GLUCOSE   Result Value Ref Range    POCT Glucose 110 (H) 74 - 99 mg/dL   POCT GLUCOSE   Result Value Ref Range    POCT Glucose 131 (H) 74 - 99 mg/dL   Calcium, Ionized   Result Value Ref Range    POCT Calcium, Ionized 1.22 1.1 - 1.33 mmol/L   Magnesium   Result Value Ref Range    Magnesium 2.34 1.60 - 2.40 mg/dL   CBC   Result Value Ref Range    WBC 9.1 4.4 - 11.3 x10*3/uL    nRBC 0.0 0.0 - 0.0 /100 WBCs    RBC 2.76 (L) 4.50 - 5.90 x10*6/uL    Hemoglobin 8.8 (L) 13.5 - 17.5 g/dL    Hematocrit 26.1 (L) 41.0 - 52.0 %    MCV 95 80 - 100 fL    MCH 31.9 26.0 - 34.0 pg    MCHC 33.7 32.0 - 36.0 g/dL    RDW 12.9 11.5 - 14.5 %    Platelets 83 (L) 150 - 450 x10*3/uL   Renal Function Panel   Result Value Ref Range    Glucose 119 (H) 74 - 99 mg/dL    Sodium 134 (L) 136 - 145 mmol/L    Potassium 4.1 3.5 - 5.3 mmol/L    Chloride 102 98 - 107 mmol/L    Bicarbonate 26 21 - 32 mmol/L    Anion Gap 10 10 - 20 mmol/L    Urea Nitrogen 27 (H) 6 - 23 mg/dL    Creatinine 1.00 0.50 - 1.30 mg/dL    eGFR 77 >60 mL/min/1.73m*2    Calcium 8.8 8.6 - 10.3 mg/dL    Phosphorus 2.6 2.5 - 4.9 mg/dL    Albumin 3.6 3.4 - 5.0 g/dL   POCT GLUCOSE   Result Value Ref Range    POCT Glucose 120 (H) 74 - 99 mg/dL   ECG 12 lead   Result Value Ref Range    Ventricular Rate 75 BPM    Atrial Rate 75 BPM    SC Interval 190 ms    QRS Duration 124 ms    QT Interval 394 ms    QTC Calculation(Bazett) 439 ms    P Axis 35 degrees    R Axis 7 degrees    T Axis 4 degrees    QRS Count 12 beats    Q Onset 225 ms    P Onset 130 ms    P Offset 181 ms    T Offset 422 ms    QTC Fredericia 424 ms   ECG 12 Lead   Result Value Ref Range    Ventricular Rate 105 BPM     Atrial Rate 91 BPM    QRS Duration 120 ms    QT Interval 310 ms    QTC Calculation(Bazett) 409 ms    R Axis 29 degrees    T Axis 0 degrees    QRS Count 17 beats    Q Onset 227 ms    T Offset 382 ms    QTC Fredericia 373 ms   POCT GLUCOSE   Result Value Ref Range    POCT Glucose 110 (H) 74 - 99 mg/dL     ECG 12 Lead    Result Date: 2/24/2024  Atrial fibrillation with rapid ventricular response Right bundle branch block Abnormal ECG When compared with ECG of 24-FEB-2024 07:15, (unconfirmed) Atrial fibrillation has replaced Sinus rhythm    ECG 12 lead    Result Date: 2/24/2024  Normal sinus rhythm RSR' or QR pattern in V1 suggests right ventricular conduction delay ST elevation, consider early repolarization, pericarditis, or injury Abnormal ECG When compared with ECG of 23-FEB-2024 06:45, (unconfirmed) ST more elevated in Lateral leads    XR chest 1 view    Result Date: 2/23/2024  Interpreted By:  Sohan Knight, STUDY: XR CHEST 1 VIEW  2/23/2024 7:21 pm   INDICATION: Signs/Symptoms:chest tubes removed   COMPARISON: 02/23/2024   ACCESSION NUMBER(S): [id number]   ORDERING CLINICIAN: JACINTO KIM   TECHNIQUE: A single AP portable radiograph of the chest was obtained.   FINDINGS: Multiple cardiac monitoring leads are seen over the chest.  Sternal wires and mediastinal surgical clips are present. A right internal jugular central venous catheter is present, with the tip overlying the superior vena cava. There has been interval removal of the bilateral chest tubes and mediastinal drains. No focal infiltrate, pleural effusion or pneumothorax is identified. The cardiac silhouette is enlarged, similar to prior studies.       No evidence of pneumothorax status post chest tube removal.   MACRO: None.   Signed by: Sohan Knight 2/23/2024 7:40 PM Dictation workstation:   [id number]    XR chest 1 view    Result Date: 2/23/2024  Interpreted By:  Lilian Ray, STUDY: XR CHEST 1 VIEW; 2/23/2024 9:24 am   INDICATION:  Signs/Symptoms:Post-op   COMPARISON: Radiographs 03/22/2024   ACCESSION NUMBER(S): [id number]   ORDERING CLINICIAN: RANDALL ROSAS   TECHNIQUE: Single frontal view of the chest performed.   FINDINGS: LINES AND DEVICES: Removed NG and endotracheal tubes. Stable right IJ PA catheter. Stable bilateral chest tubes and mediastinal drain.   LUNGS: Stable trace bilateral pleural effusions and left basilar airspace opacities. No new focal consolidation, pulmonary edema or pneumothorax.   CARDIOMEDIASTINAL SILHOUETTE: The cardiomediastinal silhouette is stable.       Removal of NG and endotracheal tubes. No significant change otherwise with trace bilateral pleural effusions and left lower lung airspace opacities. No pneumothorax.   MACRO None   Signed by: Lilian Ray 2/23/2024 10:59 AM Dictation workstation:   [id number]    ECG 12 lead    Result Date: 2/23/2024  Normal sinus rhythm RSR' or QR pattern in V1 suggests right ventricular conduction delay Cannot rule out Inferior infarct , age undetermined Abnormal ECG When compared with ECG of 22-FEB-2024 13:37, (unconfirmed) RSR' pattern in V1 has replaced Right bundle branch block    ECG 12 Lead    Result Date: 2/22/2024  Sinus rhythm with 1st degree AV block Right bundle branch block Abnormal ECG When compared with ECG of 02-FEB-2024 06:30, Right bundle branch block is now Present    XR chest 1 view    Result Date: 2/22/2024  Interpreted By:  Arnoldo Queen, STUDY: XR CHEST 1 VIEW;  2/22/2024 1:27 pm   INDICATION: Signs/Symptoms:Post-op.   COMPARISON: 7:49 a.m.   ACCESSION NUMBER(S): [id number]   ORDERING CLINICIAN: RANDALL ROSAS   TECHNIQUE: A portable radiograph of the chest is performed.   FINDINGS: Postoperative changes are now identified in the form midline sternotomy wires vascular clips. An endotracheal tube is in place with the tip 2.5 cm above the fercho. A Medina-Jun catheter is again noted with the tip of the catheter overlying the right pulmonary artery.  Bilateral chest tubes are identified in the lower thorax. A nasogastric tube projects into the stomach.   The heart is mildly enlarged. There is mild central vascular prominence. A small component of bibasilar atelectasis is suspected. There is no sizable pleural effusion or pneumothorax. The osseous structures are diffusely demineralized.       The tubes and lines are well positioned.   Mild cardiomegaly and central vascular prominence.   Small component of bibasilar atelectasis.   Continued clinical and radiographic follow-up is recommended.   Signed by: Arnoldo Queen 2/22/2024 1:43 PM Dictation workstation:   [id number]       This patient has a central line   Reason for the central line remaining today? Parenteral medication                Assessment/Plan   Principal Problem:    CAD (coronary artery disease)  Active Problems:    S/P coronary artery bypass graft x 3    CAD  CABGx3 with LIMA-LAD, SVG-PL, SVG-Diag; Endoscopic vein harvest; intraoperative LAUREN.  -Discussed with Dr. Chung  -appreciate assistance of ICU team  -Epicardial wires to remain in place today; may insulate as has not required pacing   -ASA and Statin daily  -Metoprolol 12.5mg BID. Titrate as hemodynamics allow  -gentle diuresis; Lasix 20mg IVP x1  -Keep K>4 and Mag>2  -Scheduled Tylenol x48H, Oxycodone 5mg Q4H PRN, IV Dilaudid for severe breakthrough  -Bowel regimen of Miralax and Colace  -Advance to cardiac diet; SSI TID-AC and HS; Goal BG<180  -Protonix for GI prophylaxis   -SCD's and ambulation for DVT prophylaxis; Hold sub-q heparin initiation until Platelets>100k  -Aggressive pulmonary hygiene; wean supplemental oxygen as able for SpO2>90%  -Increase activity as tolerated; therapy following   -CBC, BMP, Ca, Mag, CXR, and EKG in AM     Pericarditis  Acute post operative pericarditis as evidenced by signifant pericardial rub and mild diffuse ST elevation on EKG. Initiated on Colchicine.  -Colchicine BID x10 days    Atrial  Fibrillation  POD#2: Pt went into AFIB RVR with rates in 120s. Remained HDS with /84. Asymptomatic. Given Amiodarone bolus and started on drip per protocol. Pt converted back to sinus rhythm soon after Amio started. Episode lasted roughly an hour.   -IV Amiodarone per protocol x24H  -Will transition to PO tomorrow (Amiodarone 200mg BID)  -daily EKG; follow QT  -keep K>4 and Mag>2  -telemetry monitoring   -no indication for OAC at this time    HTN; HLD  Lipid panel: Chol 164, HDL 36.9, LDL 89,   -Statin therapy  -Metoprolol 12.5mg BID  -optimize medical management as hemodynamics allow     Anemia due to blood loss; Acquired thrombocytopenia  Acute post operative anemia   Acquired thrombocytopenia 2/2 consumption and dilution; Given platelets x1 intraoperatively.   POD#1: Hgb 10.1 and Platelets 114k  POD#2: Hgb 8.8 and Platelets 83k. Suspect dilutional component as no evidence of active bleeding noted and LOS FB +6L  -monitor for signs of active bleeding  -gentle diuresis; Lasix 20mg IVP x1. Will consider repeating this afternoon pending response and hemodynamics   -Ok for daily ASA  -Hold off on sub-q Heparin for DVT prophylaxis until Platelets>100k  -CBC in AM    I spent 45 minutes in the professional and overall care of this patient.      NATE Espinoza-CNP

## 2024-02-25 ENCOUNTER — APPOINTMENT (OUTPATIENT)
Dept: RADIOLOGY | Facility: HOSPITAL | Age: 79
DRG: 236 | End: 2024-02-25
Payer: MEDICARE

## 2024-02-25 ENCOUNTER — APPOINTMENT (OUTPATIENT)
Dept: CARDIOLOGY | Facility: HOSPITAL | Age: 79
DRG: 236 | End: 2024-02-25
Payer: MEDICARE

## 2024-02-25 LAB
ALBUMIN SERPL BCP-MCNC: 3 G/DL (ref 3.4–5)
ANION GAP SERPL CALC-SCNC: 13 MMOL/L (ref 10–20)
BUN SERPL-MCNC: 20 MG/DL (ref 6–23)
CA-I BLD-SCNC: 1.1 MMOL/L (ref 1.1–1.33)
CALCIUM SERPL-MCNC: 7.2 MG/DL (ref 8.6–10.3)
CHLORIDE SERPL-SCNC: 106 MMOL/L (ref 98–107)
CO2 SERPL-SCNC: 23 MMOL/L (ref 21–32)
CREAT SERPL-MCNC: 0.76 MG/DL (ref 0.5–1.3)
EGFRCR SERPLBLD CKD-EPI 2021: >90 ML/MIN/1.73M*2
ERYTHROCYTE [DISTWIDTH] IN BLOOD BY AUTOMATED COUNT: 12.8 % (ref 11.5–14.5)
GLUCOSE BLD MANUAL STRIP-MCNC: 103 MG/DL (ref 74–99)
GLUCOSE BLD MANUAL STRIP-MCNC: 111 MG/DL (ref 74–99)
GLUCOSE BLD MANUAL STRIP-MCNC: 111 MG/DL (ref 74–99)
GLUCOSE BLD MANUAL STRIP-MCNC: 112 MG/DL (ref 74–99)
GLUCOSE SERPL-MCNC: 209 MG/DL (ref 74–99)
HCT VFR BLD AUTO: 25.1 % (ref 41–52)
HGB BLD-MCNC: 8.5 G/DL (ref 13.5–17.5)
MAGNESIUM SERPL-MCNC: 1.89 MG/DL (ref 1.6–2.4)
MCH RBC QN AUTO: 32.4 PG (ref 26–34)
MCHC RBC AUTO-ENTMCNC: 33.9 G/DL (ref 32–36)
MCV RBC AUTO: 96 FL (ref 80–100)
NRBC BLD-RTO: 0.4 /100 WBCS (ref 0–0)
PHOSPHATE SERPL-MCNC: 2 MG/DL (ref 2.5–4.9)
PLATELET # BLD AUTO: 83 X10*3/UL (ref 150–450)
POTASSIUM SERPL-SCNC: 3.5 MMOL/L (ref 3.5–5.3)
RBC # BLD AUTO: 2.62 X10*6/UL (ref 4.5–5.9)
SODIUM SERPL-SCNC: 138 MMOL/L (ref 136–145)
WBC # BLD AUTO: 7.5 X10*3/UL (ref 4.4–11.3)

## 2024-02-25 PROCEDURE — 2500000004 HC RX 250 GENERAL PHARMACY W/ HCPCS (ALT 636 FOR OP/ED)

## 2024-02-25 PROCEDURE — 2500000004 HC RX 250 GENERAL PHARMACY W/ HCPCS (ALT 636 FOR OP/ED): Performed by: NURSE PRACTITIONER

## 2024-02-25 PROCEDURE — 2500000001 HC RX 250 WO HCPCS SELF ADMINISTERED DRUGS (ALT 637 FOR MEDICARE OP): Performed by: NURSE PRACTITIONER

## 2024-02-25 PROCEDURE — 97530 THERAPEUTIC ACTIVITIES: CPT | Mod: GP,CQ

## 2024-02-25 PROCEDURE — 2500000001 HC RX 250 WO HCPCS SELF ADMINISTERED DRUGS (ALT 637 FOR MEDICARE OP)

## 2024-02-25 PROCEDURE — 2500000005 HC RX 250 GENERAL PHARMACY W/O HCPCS: Performed by: NURSE PRACTITIONER

## 2024-02-25 PROCEDURE — 37799 UNLISTED PX VASCULAR SURGERY: CPT | Performed by: NURSE PRACTITIONER

## 2024-02-25 PROCEDURE — S4991 NICOTINE PATCH NONLEGEND: HCPCS | Performed by: SURGERY

## 2024-02-25 PROCEDURE — 2500000002 HC RX 250 W HCPCS SELF ADMINISTERED DRUGS (ALT 637 FOR MEDICARE OP, ALT 636 FOR OP/ED): Performed by: SURGERY

## 2024-02-25 PROCEDURE — 1100000001 HC PRIVATE ROOM DAILY

## 2024-02-25 PROCEDURE — 82947 ASSAY GLUCOSE BLOOD QUANT: CPT

## 2024-02-25 PROCEDURE — 99232 SBSQ HOSP IP/OBS MODERATE 35: CPT | Performed by: SURGERY

## 2024-02-25 PROCEDURE — 97116 GAIT TRAINING THERAPY: CPT | Mod: GP,CQ

## 2024-02-25 PROCEDURE — 71045 X-RAY EXAM CHEST 1 VIEW: CPT | Performed by: RADIOLOGY

## 2024-02-25 PROCEDURE — 82947 ASSAY GLUCOSE BLOOD QUANT: CPT | Mod: 91

## 2024-02-25 PROCEDURE — 93010 ELECTROCARDIOGRAM REPORT: CPT | Performed by: INTERNAL MEDICINE

## 2024-02-25 PROCEDURE — 85027 COMPLETE CBC AUTOMATED: CPT | Performed by: NURSE PRACTITIONER

## 2024-02-25 PROCEDURE — 82330 ASSAY OF CALCIUM: CPT | Performed by: NURSE PRACTITIONER

## 2024-02-25 PROCEDURE — 99232 SBSQ HOSP IP/OBS MODERATE 35: CPT | Performed by: NURSE PRACTITIONER

## 2024-02-25 PROCEDURE — 80069 RENAL FUNCTION PANEL: CPT | Performed by: NURSE PRACTITIONER

## 2024-02-25 PROCEDURE — 93005 ELECTROCARDIOGRAM TRACING: CPT

## 2024-02-25 PROCEDURE — 83735 ASSAY OF MAGNESIUM: CPT | Performed by: NURSE PRACTITIONER

## 2024-02-25 PROCEDURE — 71045 X-RAY EXAM CHEST 1 VIEW: CPT

## 2024-02-25 PROCEDURE — 2500000002 HC RX 250 W HCPCS SELF ADMINISTERED DRUGS (ALT 637 FOR MEDICARE OP, ALT 636 FOR OP/ED): Performed by: NURSE PRACTITIONER

## 2024-02-25 PROCEDURE — 94640 AIRWAY INHALATION TREATMENT: CPT

## 2024-02-25 PROCEDURE — 2500000002 HC RX 250 W HCPCS SELF ADMINISTERED DRUGS (ALT 637 FOR MEDICARE OP, ALT 636 FOR OP/ED): Mod: MUE | Performed by: NURSE PRACTITIONER

## 2024-02-25 RX ORDER — POTASSIUM PHOSPHATE, MONOBASIC AND POTASSIUM PHOSPHATE, DIBASIC 224; 236 MG/ML; MG/ML
15 INJECTION, SOLUTION INTRAVENOUS ONCE
Status: DISCONTINUED | OUTPATIENT
Start: 2024-02-25 | End: 2024-02-25 | Stop reason: ENTERED-IN-ERROR

## 2024-02-25 RX ORDER — IBUPROFEN 200 MG
1 TABLET ORAL DAILY PRN
Status: DISCONTINUED | OUTPATIENT
Start: 2024-02-25 | End: 2024-02-28 | Stop reason: HOSPADM

## 2024-02-25 RX ORDER — ACETAMINOPHEN 325 MG/1
650 TABLET ORAL EVERY 4 HOURS PRN
Status: DISCONTINUED | OUTPATIENT
Start: 2024-02-25 | End: 2024-02-28 | Stop reason: HOSPADM

## 2024-02-25 RX ORDER — POTASSIUM CHLORIDE 20 MEQ/1
20 TABLET, EXTENDED RELEASE ORAL
Status: DISCONTINUED | OUTPATIENT
Start: 2024-02-25 | End: 2024-02-28 | Stop reason: HOSPADM

## 2024-02-25 RX ORDER — FUROSEMIDE 10 MG/ML
40 INJECTION INTRAMUSCULAR; INTRAVENOUS DAILY
Status: DISCONTINUED | OUTPATIENT
Start: 2024-02-25 | End: 2024-02-28

## 2024-02-25 RX ORDER — IBUPROFEN 200 MG
1 TABLET ORAL DAILY
Status: DISCONTINUED | OUTPATIENT
Start: 2024-02-25 | End: 2024-02-25

## 2024-02-25 RX ORDER — AMIODARONE HYDROCHLORIDE 200 MG/1
200 TABLET ORAL 2 TIMES DAILY
Status: DISCONTINUED | OUTPATIENT
Start: 2024-02-25 | End: 2024-02-27

## 2024-02-25 RX ORDER — IPRATROPIUM BROMIDE AND ALBUTEROL SULFATE 2.5; .5 MG/3ML; MG/3ML
3 SOLUTION RESPIRATORY (INHALATION) 3 TIMES DAILY
Status: DISCONTINUED | OUTPATIENT
Start: 2024-02-26 | End: 2024-02-28

## 2024-02-25 RX ADMIN — MAGNESIUM SULFATE HEPTAHYDRATE 2 G: 40 INJECTION, SOLUTION INTRAVENOUS at 10:26

## 2024-02-25 RX ADMIN — AMIODARONE HYDROCHLORIDE 200 MG: 200 TABLET ORAL at 13:30

## 2024-02-25 RX ADMIN — POTASSIUM CHLORIDE 20 MEQ: 1500 TABLET, EXTENDED RELEASE ORAL at 17:59

## 2024-02-25 RX ADMIN — METOPROLOL TARTRATE 12.5 MG: 25 TABLET, FILM COATED ORAL at 08:16

## 2024-02-25 RX ADMIN — IPRATROPIUM BROMIDE AND ALBUTEROL SULFATE 3 ML: 2.5; .5 SOLUTION RESPIRATORY (INHALATION) at 06:33

## 2024-02-25 RX ADMIN — POTASSIUM CHLORIDE 40 MEQ: 29.8 INJECTION, SOLUTION INTRAVENOUS at 06:22

## 2024-02-25 RX ADMIN — POTASSIUM PHOSPHATE, MONOBASIC POTASSIUM PHOSPHATE, DIBASIC 15 MMOL: 224; 236 INJECTION, SOLUTION, CONCENTRATE INTRAVENOUS at 13:29

## 2024-02-25 RX ADMIN — ACETAMINOPHEN 650 MG: 325 TABLET ORAL at 13:30

## 2024-02-25 RX ADMIN — NICOTINE 1 PATCH: 14 PATCH, EXTENDED RELEASE TRANSDERMAL at 10:26

## 2024-02-25 RX ADMIN — ACETAMINOPHEN 650 MG: 325 TABLET ORAL at 05:36

## 2024-02-25 RX ADMIN — POLYSACCHARIDE-IRON COMPLEX 150 MG: 150 CAPSULE ORAL at 08:16

## 2024-02-25 RX ADMIN — AMIODARONE HYDROCHLORIDE 200 MG: 200 TABLET ORAL at 21:27

## 2024-02-25 RX ADMIN — ASPIRIN 81 MG: 81 TABLET, CHEWABLE ORAL at 08:16

## 2024-02-25 RX ADMIN — DOCUSATE SODIUM 100 MG: 100 CAPSULE, LIQUID FILLED ORAL at 08:16

## 2024-02-25 RX ADMIN — ACETAMINOPHEN 650 MG: 325 TABLET ORAL at 09:32

## 2024-02-25 RX ADMIN — GUAIFENESIN 1200 MG: 600 TABLET, EXTENDED RELEASE ORAL at 21:26

## 2024-02-25 RX ADMIN — GUAIFENESIN 1200 MG: 600 TABLET, EXTENDED RELEASE ORAL at 08:17

## 2024-02-25 RX ADMIN — FUROSEMIDE 40 MG: 10 INJECTION, SOLUTION INTRAMUSCULAR; INTRAVENOUS at 17:59

## 2024-02-25 RX ADMIN — COLCHICINE 0.6 MG: 0.6 TABLET, FILM COATED ORAL at 08:16

## 2024-02-25 RX ADMIN — ATORVASTATIN CALCIUM 40 MG: 20 TABLET ORAL at 21:27

## 2024-02-25 RX ADMIN — Medication 2000 UNITS: at 08:16

## 2024-02-25 RX ADMIN — MAGNESIUM OXIDE 400 MG (241.3 MG MAGNESIUM) TABLET 400 MG: TABLET at 08:16

## 2024-02-25 RX ADMIN — METOPROLOL TARTRATE 12.5 MG: 25 TABLET, FILM COATED ORAL at 21:32

## 2024-02-25 RX ADMIN — PANTOPRAZOLE SODIUM 40 MG: 40 TABLET, DELAYED RELEASE ORAL at 05:36

## 2024-02-25 ASSESSMENT — COGNITIVE AND FUNCTIONAL STATUS - GENERAL
MOVING TO AND FROM BED TO CHAIR: A LITTLE
STANDING UP FROM CHAIR USING ARMS: A LITTLE
CLIMB 3 TO 5 STEPS WITH RAILING: A LOT
MOBILITY SCORE: 18
TURNING FROM BACK TO SIDE WHILE IN FLAT BAD: A LITTLE
WALKING IN HOSPITAL ROOM: A LITTLE

## 2024-02-25 ASSESSMENT — PAIN SCALES - GENERAL
PAINLEVEL_OUTOF10: 0 - NO PAIN

## 2024-02-25 ASSESSMENT — PAIN - FUNCTIONAL ASSESSMENT
PAIN_FUNCTIONAL_ASSESSMENT: 0-10
PAIN_FUNCTIONAL_ASSESSMENT: 0-10

## 2024-02-25 NOTE — PROGRESS NOTES
Sylvester Carvalho is a 78 y.o. male on day 3 of admission presenting with CAD (coronary artery disease).    Subjective   POD#3: Pt continues to do very well. He is afebrile and HDS. Maintaining SR on IV Amiodarone and low dose BB with rates in 60s. No recurrence of AFIB noted on telemetry review. Will initiate oral today and DC IV after current bag is finished. Pt is maintaining adequate SpO2 on 3L NC. Pt reports minimal post op pain that is well controlled with Tylenol. He is tolerating diet without nausea and moving bowels regularly. Responded well to diuresis yesterday but remains 5.6L positive; continue diuresis. Pt is ambulating around entire unit with walker and standby assist; tolerating well. Will downgrade to telemetry today. There were no acute overnight events.     Objective     Physical Exam  Vitals and nursing note reviewed.   Constitutional:       General: He is not in acute distress.     Appearance: Normal appearance. He is normal weight.   HENT:      Head: Normocephalic and atraumatic.      Right Ear: External ear normal.      Left Ear: External ear normal.      Nose: Nose normal.      Mouth/Throat:      Mouth: Mucous membranes are moist.      Pharynx: Oropharynx is clear.   Eyes:      Extraocular Movements: Extraocular movements intact.      Pupils: Pupils are equal, round, and reactive to light.   Cardiovascular:      Rate and Rhythm: Normal rate and regular rhythm.      Pulses: Normal pulses.      Heart sounds: Normal heart sounds.      No friction rub.   Pulmonary:      Effort: Pulmonary effort is normal.      Comments: Improving inspiratory volume.   Slightly diminished at bases with bibasilar crackles (R>L).  Sternotomy is well approximated. Sternum stable.   Abdominal:      General: Bowel sounds are normal.      Comments: Protuberant but soft. Moving bowels regularly.    Musculoskeletal:         General: Normal range of motion.      Cervical back: Normal range of motion and neck supple.      Right  "lower leg: Edema present.      Left lower leg: Edema present.      Comments: Generalized post op weakness. Ambulating entire unit with walker and standby assist.   Trace bilateral lower extremity edema.    Skin:     General: Skin is warm and dry.   Neurological:      General: No focal deficit present.      Mental Status: He is alert and oriented to person, place, and time.   Psychiatric:         Mood and Affect: Mood normal.         Behavior: Behavior normal.         Last Recorded Vitals  Blood pressure 135/81, pulse 64, temperature 36.3 °C (97.3 °F), resp. rate 17, height 1.676 m (5' 6\"), weight 84.5 kg (186 lb 4.6 oz), SpO2 96 %.  Intake/Output last 3 Shifts:  I/O last 3 completed shifts:  In: 1425.2 (16.9 mL/kg) [P.O.:660; I.V.:464.9 (5.5 mL/kg); IV Piggyback:300.3]  Out: 2375 (28.1 mL/kg) [Urine:2375 (0.8 mL/kg/hr)]  Weight: 84.5 kg     Relevant Results  Scheduled medications  acetaminophen, 650 mg, oral, q4h  [Held by provider] amLODIPine, 2.5 mg, oral, Daily  aspirin, 81 mg, oral, Daily  atorvastatin, 40 mg, oral, Daily  cholecalciferol, 2,000 Units, oral, Daily  colchicine, 0.6 mg, oral, BID  docusate sodium, 100 mg, oral, TID  guaiFENesin, 1,200 mg, oral, BID  insulin lispro, 0-15 Units, subcutaneous, Before meals & nightly  ipratropium-albuteroL, 3 mL, nebulization, q8h  iron polysaccharides, 150 mg, oral, Daily  lidocaine, 1 patch, transdermal, q24h  lidocaine, 1 patch, transdermal, Daily  magnesium oxide, 400 mg, oral, Daily  metoprolol tartrate, 12.5 mg, oral, BID  nicotine, 1 patch, transdermal, Daily  pantoprazole, 40 mg, oral, Daily before breakfast  polyethylene glycol, 17 g, oral, BID  potassium phosphate, 15 mmol, intravenous, Once      Continuous medications  amiodarone, 0.5 mg/min, Last Rate: 0.5 mg/min (02/25/24 0635)      PRN medications  PRN medications: calcium gluconate, calcium gluconate, dextrose **OR** glucagon, ipratropium-albuteroL, magnesium sulfate, magnesium sulfate, naloxone, " oxyCODONE, oxygen, potassium chloride, potassium chloride, potassium chloride CR, potassium chloride CR, tiZANidine    Results for orders placed or performed during the hospital encounter of 02/22/24 (from the past 24 hour(s))   POCT GLUCOSE   Result Value Ref Range    POCT Glucose 105 (H) 74 - 99 mg/dL   POCT GLUCOSE   Result Value Ref Range    POCT Glucose 130 (H) 74 - 99 mg/dL   Calcium, Ionized   Result Value Ref Range    POCT Calcium, Ionized 1.10 1.1 - 1.33 mmol/L   CBC   Result Value Ref Range    WBC 7.5 4.4 - 11.3 x10*3/uL    nRBC 0.4 (H) 0.0 - 0.0 /100 WBCs    RBC 2.62 (L) 4.50 - 5.90 x10*6/uL    Hemoglobin 8.5 (L) 13.5 - 17.5 g/dL    Hematocrit 25.1 (L) 41.0 - 52.0 %    MCV 96 80 - 100 fL    MCH 32.4 26.0 - 34.0 pg    MCHC 33.9 32.0 - 36.0 g/dL    RDW 12.8 11.5 - 14.5 %    Platelets 83 (L) 150 - 450 x10*3/uL   Magnesium   Result Value Ref Range    Magnesium 1.89 1.60 - 2.40 mg/dL   Renal Function Panel   Result Value Ref Range    Glucose 209 (H) 74 - 99 mg/dL    Sodium 138 136 - 145 mmol/L    Potassium 3.5 3.5 - 5.3 mmol/L    Chloride 106 98 - 107 mmol/L    Bicarbonate 23 21 - 32 mmol/L    Anion Gap 13 10 - 20 mmol/L    Urea Nitrogen 20 6 - 23 mg/dL    Creatinine 0.76 0.50 - 1.30 mg/dL    eGFR >90 >60 mL/min/1.73m*2    Calcium 7.2 (L) 8.6 - 10.3 mg/dL    Phosphorus 2.0 (L) 2.5 - 4.9 mg/dL    Albumin 3.0 (L) 3.4 - 5.0 g/dL   POCT GLUCOSE   Result Value Ref Range    POCT Glucose 111 (H) 74 - 99 mg/dL     XR chest 1 view    Result Date: 2/25/2024  Interpreted By:  Марина Juan, STUDY: XR CHEST 1 VIEW;  2/25/2024 5:20 am   INDICATION: Signs/Symptoms:CABG.   COMPARISON: 02/24/2024   ACCESSION NUMBER(S): [id number]   ORDERING CLINICIAN: RANDALL ROSAS   FINDINGS: The cardiac silhouette is enlarged.   There is bilateral parenchymal infiltration. There appear to be calcified pleural plaques on the left.   A venous catheter is present on right with tip in the region of the superior vena cava. Sternal wires and  clips are present.   COMPARISON OF FINDING: The chest is similar.       Pleural and parenchymal disease. Enlarged cardiac silhouette.   MACRO: none   Signed by: Марина Juan 2/25/2024 8:34 AM Dictation workstation:   [id number]    XR chest 1 view    Result Date: 2/24/2024  Interpreted By:  Oscar Loredo, STUDY: XR CHEST 1 VIEW;  2/24/2024 5:54 am   INDICATION: Signs/Symptoms:CABG.   COMPARISON: 02/23/2024.   ACCESSION NUMBER(S): [id number]   ORDERING CLINICIAN: RANDALL ROSAS   FINDINGS: CARDIOMEDIASTINAL SILHOUETTE: Right jugular line remains in place with tip at the SVC level. Cardiomegaly, aortic calcifications and postoperative changes of the mediastinum are similar to prior.   LUNGS: Bibasilar irregular atelectasis and/or small infiltrates are more prominent from prior. Small pleural effusions not excluded. No appreciable pneumothorax.   ABDOMEN: No remarkable upper abdominal findings.   BONES: Multilevel endplate spurring of the spine is again seen as well as partially visualized degenerative changes of the shoulders.       1.  Bibasilar irregular atelectasis and/or small infiltrates more prominent from prior.       MACRO: None.   Signed by: Oscar Loredo 2/24/2024 12:52 PM Dictation workstation:   [id number]    ECG 12 Lead    Result Date: 2/24/2024  Atrial fibrillation with rapid ventricular response Right bundle branch block Abnormal ECG When compared with ECG of 24-FEB-2024 07:15, (unconfirmed) Atrial fibrillation has replaced Sinus rhythm    ECG 12 lead    Result Date: 2/24/2024  Normal sinus rhythm RSR' or QR pattern in V1 suggests right ventricular conduction delay ST elevation, consider early repolarization, pericarditis, or injury Abnormal ECG When compared with ECG of 23-FEB-2024 06:45, (unconfirmed) ST more elevated in Lateral leads    XR chest 1 view    Result Date: 2/23/2024  Interpreted By:  Sohan Knight, STUDY: XR CHEST 1 VIEW  2/23/2024 7:21 pm   INDICATION: Signs/Symptoms:chest tubes  removed   COMPARISON: 02/23/2024   ACCESSION NUMBER(S): [id number]   ORDERING CLINICIAN: JACINTO KIM   TECHNIQUE: A single AP portable radiograph of the chest was obtained.   FINDINGS: Multiple cardiac monitoring leads are seen over the chest.  Sternal wires and mediastinal surgical clips are present. A right internal jugular central venous catheter is present, with the tip overlying the superior vena cava. There has been interval removal of the bilateral chest tubes and mediastinal drains. No focal infiltrate, pleural effusion or pneumothorax is identified. The cardiac silhouette is enlarged, similar to prior studies.       No evidence of pneumothorax status post chest tube removal.   MACRO: None.   Signed by: Sohan Knight 2/23/2024 7:40 PM Dictation workstation:   [id number]         Assessment/Plan   Principal Problem:    CAD (coronary artery disease)  Active Problems:    S/P coronary artery bypass graft x 3    CAD  CABGx3 with LIMA-LAD, SVG-PL, SVG-Diag; Endoscopic vein harvest; intraoperative LAUREN.  -Discussed with Dr. Chung  -downgrade to telemetry   -ASA and Statin daily  -Metoprolol 12.5mg BID. Titrate as hemodynamics allow  -continue diuresis; Lasix 40mg IVP x1  -Keep K>4 and Mag>2  -PRN Tylenol, Oxycodone, and Tizanidine as ordered for pain   -Bowel regimen of Miralax and Colace  -cardiac diet; SSI TID-AC and HS; Goal BG<180  -Protonix for GI prophylaxis   -SCD's and ambulation for DVT prophylaxis; Hold sub-q heparin initiation until Platelets>100k  -Aggressive pulmonary hygiene; wean supplemental oxygen as able for SpO2>90%  -Increase activity as tolerated; therapy following   -CBC, BMP, Mag, CXR, and EKG in AM     Pericarditis  Acute post operative pericarditis as evidenced by signifant pericardial rub and mild diffuse ST elevation on EKG. Initiated on Colchicine.  -Colchicine discontinued as rub and ST elevation have resolved.      Atrial Fibrillation  POD#2: Pt went into AFIB RVR with rates in 120s.  Remained HDS with /84. Asymptomatic. Given Amiodarone bolus and started on drip per protocol. Pt converted back to sinus rhythm soon after Amio started. Episode lasted roughly an hour.   -DC IV Amiodarone once current bag is finished   -transition to PO Amiodarone 200mg BID  -daily EKG; follow QT  -keep K>4 and Mag>2  -telemetry monitoring   -no indication for OAC at this time  -Colchicine discontinued as interacts with Amiodarone      HTN; HLD  Lipid panel: Chol 164, HDL 36.9, LDL 89,   -Statin therapy  -Metoprolol 12.5mg BID  -optimize medical management as hemodynamics allow  -Cardiology consulted; appreciate assistance      Anemia due to blood loss; Acquired thrombocytopenia  Acute post operative anemia   Acquired thrombocytopenia 2/2 consumption and dilution; Given platelets x1 intraoperatively.   POD#1: Hgb 10.1 and Platelets 114k  POD#2: Hgb 8.8 and Platelets 83k. Suspect dilutional component as no evidence of active bleeding noted and LOS FB +6L  POD#3: Hgb 8.5 and Platelets 83k. No signs of active bleeding. FB + 5.6L   -monitor for signs of active bleeding  -continue diuresis; Lasix 40mg IVP x1. Will consider repeating this afternoon pending response and hemodynamics   -Ok for daily ASA  -Hold off on sub-q Heparin for DVT prophylaxis until Platelets>100k  -CBC in AM    I spent 45 minutes in the professional and overall care of this patient.      NATE Espinoza-CNP

## 2024-02-25 NOTE — PROGRESS NOTES
[facility] Critical Care Medicine       Date:  2/25/2024  Patient:  Sylvester Carvalho  YOB: 1945  MRN:  [id number]   Admit Date:  2/22/2024  ========================================================================================================    No chief complaint on file.        History of Present Illness:  Sylvester Carvalho is a 78 y.o. year old male patient with Past Medical History of  GERD, BPH, HLD, HTN, CAD who underwent CABG x3 with Dr. Chung on 2/22/2024. He was admitted to ICU post-operatively for recovery, intubated, on low dose norepinephrine, CTx3, RIJ PAC.       Interval ICU Events:  2/22: POD #0, admitted to SICU 12   2/23: POD #1, no overnight events. Adequate pain control, CT x3 intact and draining, OOB in the chair. No infusions, VSS, on 2L NC.  2/24: EKG confirmed Afib RVR, 123 bpm. Amiodarone gtt initiated, asymptomatic HDS.  2/25:  Today NSR, transitioned from IV amio to PO, otherwise no issues    Medical History:  Past Medical History:   Diagnosis Date    Arthritis     BPH (benign prostatic hyperplasia)     Cataract     Coronary artery disease     COVID-19     VACCINATED    Diverticulosis     Exudative age-related macular degeneration, left eye, stage unspecified (CMS/[facility]) 01/31/2018    Age-related macular degeneration, wet, left eye    GERD (gastroesophageal reflux disease)     History of squamous cell carcinoma of skin     Hyperlipidemia     Hypertension     Personal history of other endocrine, nutritional and metabolic disease 01/24/2018    History of high cholesterol    Wears glasses      Past Surgical History:   Procedure Laterality Date    CARDIAC CATHETERIZATION N/A 02/02/2024    Procedure: Left Heart Cath, With LV;  Surgeon: Pedro Zelaya MD;  Location: [facility];  Service: Cardiovascular;  Laterality: N/A;  Ranexa 500 mg on admit    COLONOSCOPY      TOTAL HIP ARTHROPLASTY Left 01/24/2018    Hip Replacement     Medications Prior to Admission   Medication Sig  Dispense Refill Last Dose    amLODIPine (Norvasc) 2.5 mg tablet Take 1 tablet (2.5 mg) by mouth once daily. 90 tablet 1 Past Week    aspirin 81 mg chewable tablet Chew 1 tablet (81 mg) once daily. 30 tablet 11 2024 at 0800    atorvastatin (Lipitor) 40 mg tablet Take 1 tablet (40 mg) by mouth once daily. 90 tablet 3 2024 at 2100    benazepriL-hydrochlorothiazide (Lotensin HCT) 20-25 mg tablet Take 1 tablet by mouth once daily. 90 tablet 1 2024 at 0800    cholecalciferol (Vitamin D-3) 50 MCG (2000 UT) tablet Take 1 tablet (2,000 Units) by mouth once daily.   2024 at 08/00    mupirocin (Bactroban) 2 % ointment Apply topically 2 times a day. TWICE ADAY  SINCE 24 at 2100    mv-min/FA/vit K/lutein/zeaxant (PRESERVISION AREDS 2 PLUS MV ORAL) Take by mouth.   2024 at 0800    pantoprazole (ProtoNix) 40 mg EC tablet Take 1 tablet (40 mg) by mouth once daily in the morning. Take before meals. Do not crush, chew, or split. 90 tablet 1 2024 at 0800    nitroglycerin (Nitrostat) 0.4 mg SL tablet Place 1 tablet (0.4 mg) under the tongue every 5 minutes if needed for chest pain. May repeat every 5 minutes for up to 3 doses. (Patient not taking: Reported on 2024) 25 tablet 3 Not Taking     Penicillins  Social History     Tobacco Use    Smoking status: Former     Packs/day: 1.00     Years: 36.00     Additional pack years: 0.00     Total pack years: 36.00     Types: Cigarettes     Start date: 1964     Quit date: 2000     Years since quittin.1    Smokeless tobacco: Never    Tobacco comments:     I quit smoking many many times over the years so i didnt smoke continually for 36 years notes above   Vaping Use    Vaping Use: Never used   Substance Use Topics    Alcohol use: Yes     Alcohol/week: 2.0 standard drinks of alcohol     Types: 2 Standard drinks or equivalent per week     Comment: Occasional and always socially    Drug use: Never     Family History   Problem  Relation Name Age of Onset    Other (stroke syndrome) Mother Marcia     Stroke Mother Marcia     Lung cancer Father      No Known Problems Other         Review of Systems:  14 point review of systems was completed and negative except for those specially mention in my HPI    Physical Exam:    Heart Rate:  [60-76]   Temp:  [36.3 °C (97.3 °F)-36.7 °C (98.1 °F)]   Resp:  [16-22]   BP: ()/(52-86)   Weight:  [84.5 kg (186 lb 4.6 oz)]   SpO2:  [91 %-99 %]     Physical Exam  Gen: NAD, Aax3  Heart: RRR  Lungs; CTAB  Abd; Soft, mildly distended, NTTP  Objective:    I have reviewed all medications, laboratory results, and imaging pertinent for today's encounter    Component      Latest Ref Rng 2/24/2024 2/25/2024   GLUCOSE      74 - 99 mg/dL 119 (H)  209 (H)    SODIUM      136 - 145 mmol/L 134 (L)  138    POTASSIUM      3.5 - 5.3 mmol/L 4.1  3.5    CHLORIDE      98 - 107 mmol/L 102  106    Bicarbonate      21 - 32 mmol/L 26  23    Anion Gap      10 - 20 mmol/L 10  13    Blood Urea Nitrogen      6 - 23 mg/dL 27 (H)  20    Creatinine      0.50 - 1.30 mg/dL 1.00  0.76    EGFR      >60 mL/min/1.73m*2 77  >90    Calcium      8.6 - 10.3 mg/dL 8.8  7.2 (L)    PHOSPHORUS      2.5 - 4.9 mg/dL 2.6  2.0 (L)    Albumin      3.4 - 5.0 g/dL 3.6  3.0 (L)    WBC      4.4 - 11.3 x10*3/uL 9.1  7.5    nRBC      0.0 - 0.0 /100 WBCs 0.0  0.4 (H)    RBC      4.50 - 5.90 x10*6/uL 2.76 (L)  2.62 (L)    HEMOGLOBIN      13.5 - 17.5 g/dL 8.8 (L)  8.5 (L)    HEMATOCRIT      41.0 - 52.0 % 26.1 (L)  25.1 (L)    MCV      80 - 100 fL 95  96    MCH      26.0 - 34.0 pg 31.9  32.4    MCHC      32.0 - 36.0 g/dL 33.7  33.9    RED CELL DISTRIBUTION WIDTH      11.5 - 14.5 % 12.9  12.8    Platelets      150 - 450 x10*3/uL 83 (L)  83 (L)    POCT Calcium, Ionized      1.1 - 1.33 mmol/L 1.22  1.10    MAGNESIUM      1.60 - 2.40 mg/dL 2.34  1.89    POCT Glucose      74 - 99 mg/dL 130 (H)  103 (H)    POCT Glucose       105 (H)  111 (H)    POCT Glucose       110 (H)      POCT Glucose       120 (H)        Legend:  (H) High  (L) Low    Assessment/Plan:    I am currently managing this critically ill patient for the following problems:    Neuro/Psych/Pain Ctrl/Sedation:  Post-op pain resolved  -Tylenol, OXY PRN, lidocaine patches, zanaflex  -Delirium precautions     Respiratory/ENT:  Post-Op Respiratory Insufficiency   Titrate O2 to maintain SpO2 >92%  Continue IS, EzPAP, pulm hygiene  Daily CXR  Mucinex BID     Cardiovascular:  Triple Vessel CAD s/p CABG x 3 LIMA-LAD, SVG-PL, SVG-DIAG  Hx HTN HLD  Acute pericarditis  Afib RVR 2/24  ASA, statin, metoprolol  Daily EKG  Colchicine initiated, BID treatment for 10 days  Amio gtt continue through today, switch to 200mg PO BID     GI:  GERD  Continue PPI   Tolerating Cardiac diet  Bowel regimen, no BM, +BS, passing flatus     Renal/Volume Status (Intra & Extravascular):  Hx BPH  Monitor I/O  Daily RFP, Mg  Replete electrolytes to maintain K >4.0 and Mg >2.0     Endocrine  Goal BG <180  POCT glucose  SSI as needed     Infectious Disease:  No active issues     Heme/Onc:  No active issues  Monitor for bleeding, H/H drop today suspected dilutional   Daily CBC     MSK:  Ambulate TID, Activity as tolerated     Ethics/Code Status:  Full Code     :  DVT Prophylaxis: Subcu heparin  GI Prophylaxis: PPI  Bowel Regimen: Scheduled/PRN  Diet: Cardiac  CVC:  RIJ Cordis - DC per CT surgery  [place]: No  [place]: No  Restraints: No  Dispo:  [facility]    Critical Care Time:  42 minutes    Ronald Dobbins MD  02/25/24  12:54 PM

## 2024-02-25 NOTE — CARE PLAN
The patient's goals for the shift include Control pain    The clinical goals for the shift include Patient's vital signs will remain WNL and will remain in sinus rhythm throughout shift    Over the shift, the patient did make progress toward the following goals.  Recommendations to address these barriers include continuing medications that are ordered by physicians.

## 2024-02-25 NOTE — PROGRESS NOTES
Physical Therapy    Physical Therapy Treatment    Patient Name: Sylvester Carvalho  MRN: [id number]  Today's Date: 2/25/2024  Time Calculation  Start Time: 0930  Stop Time: 0954  Time Calculation (min): 24 min       Assessment/Plan   PT Assessment  PT Assessment Results: Decreased strength, Decreased endurance, Impaired balance, Decreased mobility, Pain  Rehab Prognosis: Good  End of Session Communication: Bedside nurse  Assessment Comment: Pt reports feeling better today and eager to participate with PT. He does report mild fatigue at end of session and some dizziness, vitals stable. Pt will benefit from continued skilled PT services to further progress strength and functional mobility. (Family states bed moved downstairs and pt will stay on the first floor.)  End of Session Patient Position: Up in chair, Alarm off, not on at start of session  PT Plan  Inpatient/Swing Bed or Outpatient: Inpatient  PT Plan  Treatment/Interventions: Bed mobility, Transfer training, Gait training, Strengthening, Therapeutic exercise  PT Plan: Skilled PT  PT Frequency: 4 times per week  PT Discharge Recommendations: Low intensity level of continued care  PT Recommended Transfer Status: Assist x1    General Visit Information:   PT  Visit  PT Received On: 02/25/24  General  Reason for Referral: Impaired mobility  Referred By: OT/PT Marcio 2/22  Past Medical History Relevant to Rehab: Macular degeneration, BPH, CAD, GERD, HTN, HLD, OA  Prior to Session Communication: Bedside nurse  Patient Position Received: Bed, 3 rail up, Alarm off, not on at start of session  General Comment: Pt is pleasant and cooperative, eager to participate with PT. Pt's family entering the room during the session. (C/O mild dizziness during session all vitals WNL, nursing aware.)    Subjective   Precautions:  Precautions  Medical Precautions: Fall precautions  Post-Surgical Precautions: Move in the Tube  Precautions Comment: Pt asking for clarification of MITT precautions  as pertains to getting OOB, seems satisfied with education. Spouse has MITT packet and family states a bed was moved to the 1st floor for the pt.  Vital Signs:  Vital Signs  Heart Rate: 67  SpO2: 96 % (On 2L oxygen)  BP: 133/82    Objective   Pain:  Pain Assessment  Pain Assessment: 0-10  Pain Score: 0 - No pain  Cognition:  Cognition  Orientation Level: Oriented X4  Postural Control:     Extremity/Trunk Assessments:        Activity Tolerance:  Activity Tolerance  Endurance: Tolerates less than 10 min exercise, no significant change in vital signs  Treatments:  Therapeutic Exercise  Therapeutic Exercise Performed: Yes (Seated AP, LAQ, marches x 10ea BLE. Pt eager to visit with family, distracted.)    Bed Mobility  Bed Mobility: Yes (Pt asking for additional education and seemingly satisfied with education provided. Bed mobility with verbal cues and SBA.)    Ambulation/Gait Training  Ambulation/Gait Training Performed: Yes (Pt ambulating with a WW for 100' and 2L oxygen at SBA level with PTA managing lines. Pt demos a slow, reciprocal gait with fair step height/length. Pt c/o mild dizziness with nursing aware.)  Transfers  Transfer: Yes (Multiple trials of sit<>stand transfers from EOB with use of pillow and with hands placed on knees after pt initially attempts to stand by reaching for WW.)    Outcome Measures:  [facility] Basic Mobility  Turning from your back to your side while in a flat bed without using bedrails: None  Moving from lying on your back to sitting on the side of a flat bed without using bedrails: A little  Moving to and from bed to chair (including a wheelchair): A little  Standing up from a chair using your arms (e.g. wheelchair or bedside chair): A little  To walk in hospital room: A little  Climbing 3-5 steps with railing: A lot  Basic Mobility - Total Score: 18    Education Documentation  Precautions, taught by Brittany Tillman PTA at 2/25/2024  1:10 PM.  Learner: Family, Significant Other,  Patient  Readiness: Acceptance  Method: Explanation  Response: Verbalizes Understanding    Mobility Training, taught by Brittany Tillman PTA at 2/25/2024  1:10 PM.  Learner: Family, Significant Other, Patient  Readiness: Acceptance  Method: Explanation  Response: Verbalizes Understanding    Education Comments  No comments found.        EDUCATION:  Outpatient Education  Individual(s) Educated: Patient, Spouse  Education Provided: Body Mechanics, Home Exercise Program, Fall Risk  Education Comment: Pt educated in gait, transfers and ther ex. Pt requesting MITT packet but spouse arrives and has it. Pt reports having a WW at home.    Encounter Problems       Encounter Problems (Active)       PT Problem       Pt will demonstrate mod I  with bed mobility to edge of bed.   (Progressing)       Start:  02/23/24    Expected End:  03/08/24            Pt will demonstrate mod I  with sit to stand/chair transfers with no A/D    (Progressing)       Start:  02/23/24    Expected End:  03/08/24            Pt will ambulate 200 feet with mod I  no A/D .   (Progressing)       Start:  02/23/24    Expected End:  03/08/24            Pt to demo 14 steps with  rails with SBA  (Progressing)       Start:  02/23/24    Expected End:  03/08/24               Pain - Adult

## 2024-02-26 ENCOUNTER — APPOINTMENT (OUTPATIENT)
Dept: CARDIOLOGY | Facility: HOSPITAL | Age: 79
DRG: 236 | End: 2024-02-26
Payer: MEDICARE

## 2024-02-26 DIAGNOSIS — Z95.1 S/P CABG X 3: Primary | ICD-10-CM

## 2024-02-26 LAB
ALBUMIN SERPL BCP-MCNC: 3.3 G/DL (ref 3.4–5)
ANION GAP SERPL CALC-SCNC: 10 MMOL/L (ref 10–20)
BUN SERPL-MCNC: 21 MG/DL (ref 6–23)
CALCIUM SERPL-MCNC: 8.7 MG/DL (ref 8.6–10.3)
CHLORIDE SERPL-SCNC: 105 MMOL/L (ref 98–107)
CO2 SERPL-SCNC: 28 MMOL/L (ref 21–32)
CREAT SERPL-MCNC: 0.92 MG/DL (ref 0.5–1.3)
EGFRCR SERPLBLD CKD-EPI 2021: 85 ML/MIN/1.73M*2
ERYTHROCYTE [DISTWIDTH] IN BLOOD BY AUTOMATED COUNT: 13 % (ref 11.5–14.5)
GLUCOSE BLD MANUAL STRIP-MCNC: 101 MG/DL (ref 74–99)
GLUCOSE BLD MANUAL STRIP-MCNC: 102 MG/DL (ref 74–99)
GLUCOSE BLD MANUAL STRIP-MCNC: 105 MG/DL (ref 74–99)
GLUCOSE BLD MANUAL STRIP-MCNC: 98 MG/DL (ref 74–99)
GLUCOSE SERPL-MCNC: 100 MG/DL (ref 74–99)
HCT VFR BLD AUTO: 26.9 % (ref 41–52)
HGB BLD-MCNC: 8.9 G/DL (ref 13.5–17.5)
MAGNESIUM SERPL-MCNC: 2.2 MG/DL (ref 1.6–2.4)
MCH RBC QN AUTO: 31.6 PG (ref 26–34)
MCHC RBC AUTO-ENTMCNC: 33.1 G/DL (ref 32–36)
MCV RBC AUTO: 95 FL (ref 80–100)
NRBC BLD-RTO: 0 /100 WBCS (ref 0–0)
PHOSPHATE SERPL-MCNC: 2.9 MG/DL (ref 2.5–4.9)
PLATELET # BLD AUTO: 129 X10*3/UL (ref 150–450)
POTASSIUM SERPL-SCNC: 3.9 MMOL/L (ref 3.5–5.3)
RBC # BLD AUTO: 2.82 X10*6/UL (ref 4.5–5.9)
SODIUM SERPL-SCNC: 139 MMOL/L (ref 136–145)
WBC # BLD AUTO: 6.7 X10*3/UL (ref 4.4–11.3)

## 2024-02-26 PROCEDURE — 1100000001 HC PRIVATE ROOM DAILY

## 2024-02-26 PROCEDURE — 2500000001 HC RX 250 WO HCPCS SELF ADMINISTERED DRUGS (ALT 637 FOR MEDICARE OP): Performed by: NURSE PRACTITIONER

## 2024-02-26 PROCEDURE — 93010 ELECTROCARDIOGRAM REPORT: CPT | Performed by: INTERNAL MEDICINE

## 2024-02-26 PROCEDURE — 85027 COMPLETE CBC AUTOMATED: CPT | Performed by: NURSE PRACTITIONER

## 2024-02-26 PROCEDURE — 2500000005 HC RX 250 GENERAL PHARMACY W/O HCPCS: Performed by: NURSE PRACTITIONER

## 2024-02-26 PROCEDURE — 97530 THERAPEUTIC ACTIVITIES: CPT | Mod: GP,CQ

## 2024-02-26 PROCEDURE — 37799 UNLISTED PX VASCULAR SURGERY: CPT | Performed by: NURSE PRACTITIONER

## 2024-02-26 PROCEDURE — 83735 ASSAY OF MAGNESIUM: CPT | Performed by: NURSE PRACTITIONER

## 2024-02-26 PROCEDURE — 80069 RENAL FUNCTION PANEL: CPT | Performed by: NURSE PRACTITIONER

## 2024-02-26 PROCEDURE — 82947 ASSAY GLUCOSE BLOOD QUANT: CPT

## 2024-02-26 PROCEDURE — 99233 SBSQ HOSP IP/OBS HIGH 50: CPT | Performed by: NURSE PRACTITIONER

## 2024-02-26 PROCEDURE — 2500000002 HC RX 250 W HCPCS SELF ADMINISTERED DRUGS (ALT 637 FOR MEDICARE OP, ALT 636 FOR OP/ED): Mod: MUE | Performed by: THORACIC SURGERY (CARDIOTHORACIC VASCULAR SURGERY)

## 2024-02-26 PROCEDURE — 2500000002 HC RX 250 W HCPCS SELF ADMINISTERED DRUGS (ALT 637 FOR MEDICARE OP, ALT 636 FOR OP/ED): Mod: MUE | Performed by: NURSE PRACTITIONER

## 2024-02-26 PROCEDURE — 97116 GAIT TRAINING THERAPY: CPT | Mod: GP,CQ

## 2024-02-26 PROCEDURE — 2500000004 HC RX 250 GENERAL PHARMACY W/ HCPCS (ALT 636 FOR OP/ED): Performed by: NURSE PRACTITIONER

## 2024-02-26 PROCEDURE — 97535 SELF CARE MNGMENT TRAINING: CPT | Mod: GO,CO

## 2024-02-26 PROCEDURE — 99223 1ST HOSP IP/OBS HIGH 75: CPT | Performed by: INTERNAL MEDICINE

## 2024-02-26 PROCEDURE — 2500000002 HC RX 250 W HCPCS SELF ADMINISTERED DRUGS (ALT 637 FOR MEDICARE OP, ALT 636 FOR OP/ED): Performed by: NURSE PRACTITIONER

## 2024-02-26 PROCEDURE — 94640 AIRWAY INHALATION TREATMENT: CPT | Mod: MUE

## 2024-02-26 PROCEDURE — 93005 ELECTROCARDIOGRAM TRACING: CPT

## 2024-02-26 PROCEDURE — 97530 THERAPEUTIC ACTIVITIES: CPT | Mod: GO,CO

## 2024-02-26 RX ORDER — TRAMADOL HYDROCHLORIDE 50 MG/1
50 TABLET ORAL EVERY 6 HOURS PRN
Status: DISCONTINUED | OUTPATIENT
Start: 2024-02-26 | End: 2024-02-28 | Stop reason: HOSPADM

## 2024-02-26 RX ORDER — AMIODARONE HYDROCHLORIDE 200 MG/1
400 TABLET ORAL ONCE
Status: COMPLETED | OUTPATIENT
Start: 2024-02-26 | End: 2024-02-26

## 2024-02-26 RX ORDER — OXYCODONE HYDROCHLORIDE 5 MG/1
5 TABLET ORAL EVERY 4 HOURS PRN
Status: DISCONTINUED | OUTPATIENT
Start: 2024-02-26 | End: 2024-02-28 | Stop reason: HOSPADM

## 2024-02-26 RX ADMIN — Medication 2000 UNITS: at 08:22

## 2024-02-26 RX ADMIN — METOPROLOL TARTRATE 12.5 MG: 25 TABLET, FILM COATED ORAL at 08:23

## 2024-02-26 RX ADMIN — AMIODARONE HYDROCHLORIDE 200 MG: 200 TABLET ORAL at 08:23

## 2024-02-26 RX ADMIN — ATORVASTATIN CALCIUM 40 MG: 20 TABLET ORAL at 20:07

## 2024-02-26 RX ADMIN — FUROSEMIDE 40 MG: 10 INJECTION, SOLUTION INTRAMUSCULAR; INTRAVENOUS at 08:29

## 2024-02-26 RX ADMIN — Medication 2 L/MIN: at 07:11

## 2024-02-26 RX ADMIN — POTASSIUM CHLORIDE 20 MEQ: 1500 TABLET, EXTENDED RELEASE ORAL at 16:23

## 2024-02-26 RX ADMIN — AMIODARONE HYDROCHLORIDE 200 MG: 200 TABLET ORAL at 20:07

## 2024-02-26 RX ADMIN — AMIODARONE HYDROCHLORIDE 400 MG: 200 TABLET ORAL at 12:47

## 2024-02-26 RX ADMIN — ASPIRIN 81 MG: 81 TABLET, CHEWABLE ORAL at 08:22

## 2024-02-26 RX ADMIN — GUAIFENESIN 1200 MG: 600 TABLET, EXTENDED RELEASE ORAL at 20:07

## 2024-02-26 RX ADMIN — MAGNESIUM OXIDE 400 MG (241.3 MG MAGNESIUM) TABLET 400 MG: TABLET at 08:23

## 2024-02-26 RX ADMIN — IPRATROPIUM BROMIDE AND ALBUTEROL SULFATE 3 ML: 2.5; .5 SOLUTION RESPIRATORY (INHALATION) at 14:02

## 2024-02-26 RX ADMIN — IPRATROPIUM BROMIDE AND ALBUTEROL SULFATE 3 ML: 2.5; .5 SOLUTION RESPIRATORY (INHALATION) at 07:11

## 2024-02-26 RX ADMIN — GUAIFENESIN 1200 MG: 600 TABLET, EXTENDED RELEASE ORAL at 08:23

## 2024-02-26 RX ADMIN — POTASSIUM CHLORIDE 20 MEQ: 1500 TABLET, EXTENDED RELEASE ORAL at 08:23

## 2024-02-26 RX ADMIN — POTASSIUM CHLORIDE 20 MEQ: 1500 TABLET, EXTENDED RELEASE ORAL at 05:50

## 2024-02-26 RX ADMIN — PANTOPRAZOLE SODIUM 40 MG: 40 TABLET, DELAYED RELEASE ORAL at 05:50

## 2024-02-26 RX ADMIN — METOPROLOL TARTRATE 12.5 MG: 25 TABLET, FILM COATED ORAL at 20:07

## 2024-02-26 RX ADMIN — DOCUSATE SODIUM 100 MG: 100 CAPSULE, LIQUID FILLED ORAL at 08:22

## 2024-02-26 RX ADMIN — IPRATROPIUM BROMIDE AND ALBUTEROL SULFATE 3 ML: 2.5; .5 SOLUTION RESPIRATORY (INHALATION) at 20:26

## 2024-02-26 RX ADMIN — POLYSACCHARIDE-IRON COMPLEX 150 MG: 150 CAPSULE ORAL at 08:22

## 2024-02-26 ASSESSMENT — COGNITIVE AND FUNCTIONAL STATUS - GENERAL
MOVING TO AND FROM BED TO CHAIR: A LITTLE
DAILY ACTIVITIY SCORE: 20
DRESSING REGULAR LOWER BODY CLOTHING: A LITTLE
TURNING FROM BACK TO SIDE WHILE IN FLAT BAD: A LITTLE
DAILY ACTIVITIY SCORE: 20
TOILETING: A LITTLE
WALKING IN HOSPITAL ROOM: A LITTLE
TOILETING: A LITTLE
CLIMB 3 TO 5 STEPS WITH RAILING: A LITTLE
DRESSING REGULAR UPPER BODY CLOTHING: A LITTLE
HELP NEEDED FOR BATHING: A LITTLE
DRESSING REGULAR LOWER BODY CLOTHING: A LITTLE
MOVING TO AND FROM BED TO CHAIR: A LITTLE
DRESSING REGULAR UPPER BODY CLOTHING: A LITTLE
MOBILITY SCORE: 20
WALKING IN HOSPITAL ROOM: A LITTLE
CLIMB 3 TO 5 STEPS WITH RAILING: A LITTLE
HELP NEEDED FOR BATHING: A LITTLE
TURNING FROM BACK TO SIDE WHILE IN FLAT BAD: A LITTLE
MOBILITY SCORE: 20

## 2024-02-26 ASSESSMENT — PAIN - FUNCTIONAL ASSESSMENT
PAIN_FUNCTIONAL_ASSESSMENT: 0-10

## 2024-02-26 ASSESSMENT — PAIN SCALES - GENERAL
PAINLEVEL_OUTOF10: 0 - NO PAIN

## 2024-02-26 ASSESSMENT — ACTIVITIES OF DAILY LIVING (ADL): HOME_MANAGEMENT_TIME_ENTRY: 23

## 2024-02-26 NOTE — PROGRESS NOTES
Physical Therapy    Physical Therapy Treatment    Patient Name: Sylvester Carvalho  MRN: [id number]  Today's Date: 2/26/2024  Time Calculation  Start Time: 0908  Stop Time: 0934  Time Calculation (min): 26 min       Assessment/Plan   PT Assessment  PT Assessment Results: Decreased strength, Decreased endurance, Impaired balance, Decreased mobility, Pain  Rehab Prognosis: Good  End of Session Communication: Bedside nurse  Assessment Comment: Pt continues to make progress toward all goals with increased gait distance and stair training. Pt needing only minimal cues for hand placement with sit>stand. Pt has family assist at discharge.  End of Session Patient Position: Up in chair, Alarm off, not on at start of session  PT Plan  Inpatient/Swing Bed or Outpatient: Inpatient  PT Plan  Treatment/Interventions: Bed mobility, Transfer training, Gait training, Stair training, Strengthening  PT Plan: Skilled PT  PT Frequency: 4 times per week  PT Discharge Recommendations: Low intensity level of continued care  PT Recommended Transfer Status: Assist x1      General Visit Information:   PT  Visit  PT Received On: 02/26/24  General  Reason for Referral: Impaired mobility  Referred By: OT/PT Marcio 2/22  Past Medical History Relevant to Rehab: Macular degeneration, BPH, CAD, GERD, HTN, HLD, OA  Prior to Session Communication: Bedside nurse  Patient Position Received: Up in chair, Alarm off, not on at start of session  General Comment: Pt exiting RR upon arrival and pleasant. Eager to participate with PT.    Subjective   Precautions:  Precautions  Medical Precautions: Fall precautions  Post-Surgical Precautions: Move in the Tube  Vital Signs:  Vital Signs  SpO2:  (91-96% on RA trial fluctuating throughout session. Dropping slightly to 91 after stair climbing.)    Objective   Pain:  Pain Assessment  Pain Assessment: 0-10  Pain Score: 0 - No pain  Cognition:  Cognition  Orientation Level: Oriented X4  Postural Control:     Extremity/Trunk  Assessments:        Activity Tolerance:  Activity Tolerance  Endurance: Endurance does not limit participation in activity  Treatments:  Therapeutic Exercise  Therapeutic Exercise Performed: Yes (Seated AP, LAQ and marches x 10ea BLE.)    Bed Mobility  Bed Mobility: Yes (Pt wanting additional training with sup<>sit transfers. Performed from flat surface with good return demo with log roll, ability to maintain MITT precautions throughout.)    Ambulation/Gait Training  Ambulation/Gait Training Performed: Yes (Pt ambulating 200' with WW and SBA. Pt reports feeling mild fatigue at end of session. SPO2 maintained in 90's throughout. Pt demos a slow, reciprocal gait.)  Transfers  Transfer: Yes (Multiple sit<>stand transfers from EOB and chair. Pt continues to need cues for hand placement, tends to reach out or attempt to push up but does very well with hands placed on thighs. Pt using a WW this session and given supervision.)    Stairs  Stairs: Yes (Pt reports he will be staying on the 1st floor in bed. Pt does have 3 steps with handrail into home and completes stair training x 5 steps with one hand rail and reciprocal gait with SBA.)    Outcome Measures:  [facility] Basic Mobility  Turning from your back to your side while in a flat bed without using bedrails: None  Moving from lying on your back to sitting on the side of a flat bed without using bedrails: A little  Moving to and from bed to chair (including a wheelchair): A little  Standing up from a chair using your arms (e.g. wheelchair or bedside chair): None  To walk in hospital room: A little  Climbing 3-5 steps with railing: A little  Basic Mobility - Total Score: 20    Education Documentation  Precautions, taught by Brittany Tillman PTA at 2/26/2024 10:23 AM.  Learner: Patient  Readiness: Acceptance  Method: Explanation  Response: Verbalizes Understanding    Mobility Training, taught by Brittany Tillman PTA at 2/26/2024 10:23 AM.  Learner: Patient  Readiness:  Acceptance  Method: Explanation  Response: Verbalizes Understanding    Education Comments  No comments found.        EDUCATION:  Outpatient Education  Individual(s) Educated: Patient  Education Provided: Body Mechanics, Home Exercise Program, Post-Op Precautions  Education Comment: Pt educated in gait, transfers, ther ex, stair training and precautions.    Encounter Problems       Encounter Problems (Active)       PT Problem       Pt will demonstrate mod I  with bed mobility to edge of bed.   (Progressing)       Start:  02/23/24    Expected End:  02/28/24            Pt will demonstrate mod I  with sit to stand/chair transfers with no A/D    (Progressing)       Start:  02/23/24    Expected End:  02/28/24            Pt will ambulate 200 feet with mod I  no A/D .   (Progressing)       Start:  02/23/24    Expected End:  02/28/24            Pt to demo 14 steps with  rails with SBA  (Progressing)       Start:  02/23/24    Expected End:  02/28/24               Pain - Adult

## 2024-02-26 NOTE — PROGRESS NOTES
Occupational Therapy    OT Treatment    Patient Name: Sylvester Carvalho  MRN: [id number]  Today's Date: 2/26/2024  Time Calculation  Start Time: 0937  Stop Time: 1438  Time Calculation (min): 301 min         Assessment:  OT Assessment: Pt demo'd improved activity tolerance and balance this date.  Pt performed adl's and transfers/mobility with SBA  Prognosis: Excellent  End of Session Communication: Bedside nurse  End of Session Patient Position: Alarm off, not on at start of session (1st tx session, pt up in chair. 2nd tx session, pt seated at EOB with spouse and PCT present.  All needs met, call light within reach.)  Prognosis: Excellent  Plan:  Treatment Interventions: ADL retraining, Functional transfer training, Compensatory technique education, Patient/family training  OT Frequency: 2 times per week  OT Discharge Recommendations: Low intensity level of continued care    Treatment Interventions: ADL retraining, Functional transfer training, Compensatory technique education, Patient/family training    Subjective   Previous Visit Info:  OT Last Visit  OT Received On: 02/26/24  General:  General  Prior to Session Communication: Bedside nurse  Patient Position Received: Up in chair, Alarm off, not on at start of session  General Comment: Pt agreeable to OT, pleasant and cooperative. (**Split tx performed  (9:37-10:00 and 14:28-14:38 d/t pt requesting therapist to return once wife arrived for bed mobility training and review of questions and concerns.)  Precautions:  Medical Precautions: Fall precautions  Post-Surgical Precautions: Move in the Tube  Vital Signs:  Vital Signs  SpO2:  (94-96% on RA)  Pain:  Pain Assessment  Pain Assessment: 0-10  Pain Score: 0 - No pain    Objective    Cognition:  Cognition  Overall Cognitive Status: Within Functional Limits  Orientation Level: Oriented X4       Activities of Daily Living:      UE Dressing  UE Dressing Comments: Reviewed MITT precautions with UB dressing. Pt verbalized  understanding and reports wife will assist.    LE Dressing  LE Dressing:  (Pt donned LB clothing from chair level with SBA.  Min verbal cues provided on adherence to MITT precautions.  Pt demo'd good carryover and understanding.)          Bed Mobility/Transfers: Bed Mobility  Bed Mobility: Yes  Bed Mobility 1  Bed Mobility 1: Supine to sitting, Sitting to supine, Log roll  Bed Mobility Comments 1: Pt and spouse educated on log roll technique for supine<>sit transfer.  Intermittent min assist to elevate trunk to upright seated position.  x 2 trials, spouse assisted with second trial    Transfers  Transfer: Yes  Transfer 1  Technique 1: Sit to stand, Stand to sit  Transfer Level of Assistance 1: Distant supervision  Trials/Comments 1: Multiple sit to stand transfers from various surfaces with SBA (Pt demo'd good adherence to MITT precautions with STS transfer.)    Toilet Transfers  Toilet Transfer From: Chair  Toilet Transfer Type: To and from  Toilet Transfer to: Raised toilet seat without rails  Toilet Transfer Technique: Ambulating  Toilet Transfers:  (SBA)         Ambulation/Gait Training:  Ambulation/Gait Training  Ambulation/Gait Training Performed:  (Functional/ADL ambulation in room without AD between ADL tasks (ambulation between bed, chair, toilet, sink) with SBA)    Outcome Measures:[facility] Daily Activity  Putting on and taking off regular lower body clothing: A little  Bathing (including washing, rinsing, drying): A little  Putting on and taking off regular upper body clothing: A little  Toileting, which includes using toilet, bedpan or urinal: A little  Taking care of personal grooming such as brushing teeth: None  Eating Meals: None  Daily Activity - Total Score: 20        Education Documentation  Body Mechanics, taught by Jennifer L Felty, OTA at 2/26/2024  3:40 PM.  Learner: Significant Other, Patient  Readiness: Eager  Method: Explanation, Demonstration, Teach-back  Response: Verbalizes  Understanding  Comment: Educated on safety and fall prevention with completion of adl's    Precautions, taught by Jennifer L Felty, OTA at 2/26/2024  3:40 PM.  Learner: Significant Other, Patient  Readiness: Eager  Method: Explanation, Demonstration, Teach-back  Response: Verbalizes Understanding  Comment: Educated on safety and fall prevention with completion of adl's    ADL Training, taught by Jennifer L Felty, OTA at 2/26/2024  3:40 PM.  Learner: Significant Other, Patient  Readiness: Eager  Method: Explanation, Demonstration, Teach-back  Response: Verbalizes Understanding  Comment: Educated on safety and fall prevention with completion of adl's    Education Comments  Pt able to independently verbalize MITT precautions.  Spouse also aware of MITT precautions.       EDUCATION: Pt educated on MITT precautions; safe transfer techniques including hand placement and positioning; techniques/strategies for balance improvement; compensatory adl techniques; safe body mechanics; energy conservation techniques.            Goals:  Encounter Problems       Encounter Problems (Active)       OT Goals       Mod I vs. indep for all functional transfers  (Progressing)       Start:  02/23/24    Expected End:  02/28/24            Indep verbal and physical carryover of MITT during ADLs and functional activities (Progressing)       Start:  02/23/24    Expected End:  02/28/24            Indep with LB dressing.  (Progressing)       Start:  02/23/24    Expected End:  02/28/24            Mod I vs. indep functional mobility household distances for ADLs/IADLs  (Progressing)       Start:  02/23/24    Expected End:  02/28/24

## 2024-02-26 NOTE — CONSULTS
Inpatient consult to Cardiology  Consult performed by: Arnoldo Lin DO  Consult ordered by: NATE Espinoza-CNP  Reason for consult: cabg      Cardiology Consult Note      Date:   2/26/2024  Patient name:  Sylvester Carvalho  Date of admission:  2/22/2024  5:10 AM  MRN:   [id number]  YOB: 1945  Time of Consult:  11:36 AM    Consulting Cardiologist: Dr. Arnoldo Ho, NATE, CNP  Primary Cardiologist:  Dr. Pedro Zelaya    Referring Provider: Dr Chung      Admission Diagnosis:     CAD (coronary artery disease)      History of Present Illness:      Sylvester Carvalho is a 78 y.o.  male patient who is being at the request of Dr. Chung for inpatient consultation of  s/p CABG . He was admitted on 2/22/2024.  Previous [facility] and [facility] records have been reviewed in detail.    Patient with a history of CAD, hypertension, dyslipidemia, EF 50%.  Patient came in on 2/22/2024 had triple-vessel CABG done on.  LIMA to the LAD  Saphenous vein graft to the PL  Saphenous vein graft to the diagonal  Recovered well in the ICU he is currently on telemetry he denies having any chest pain he is very thankful for having the procedure.  He denies chest pain, shortness of breath, nausea, vomiting, PND, orthopnea, claudications  Telemetry is normal sinus rhythm occ PVC  EKG is normal sinus rhythm with occasional PVC no acute changes    Cardiac history  2/1/24  1. Distal Left Main: 10-30% stenosis.   2. Proximal LAD Lesion: The percent stenosis is 80%.   3. Mid LAD Lesion: The percent stenosis is 40%.   4. Entire CX Lesion: The percent stenosis is <10%.   5. Distal and proximal RCA Lesion: The percent stenosis is 10-30%.   6. Prox PLVB RCA Lesion: The percent stenosis is 90 %.   7. The Left Ventricular Ejection Fraction is 60%.      CONCLUSIONS:   1. Left ventricular systolic function is normal with a 60-65% estimated ejection fraction.   2. Spectral Doppler shows an impaired relaxation pattern of  "left ventricular diastolic filling.   3. Mild tricuspid regurgitation is visualized.     QUANTITATIVE DATA SUMMARY:      Allergies:     Allergies   Allergen Reactions    Penicillins Swelling     \"Whole body swelled up\"         Past Medical History:     Past Medical History:   Diagnosis Date    Arthritis     BPH (benign prostatic hyperplasia)     Cataract     Coronary artery disease     COVID-19     VACCINATED    Diverticulosis     Exudative age-related macular degeneration, left eye, stage unspecified (CMS/[facility]) 2018    Age-related macular degeneration, wet, left eye    GERD (gastroesophageal reflux disease)     History of squamous cell carcinoma of skin     Hyperlipidemia     Hypertension     Personal history of other endocrine, nutritional and metabolic disease 2018    History of high cholesterol    Wears glasses        Past Surgical History:     Past Surgical History:   Procedure Laterality Date    CARDIAC CATHETERIZATION N/A 2024    Procedure: Left Heart Cath, With LV;  Surgeon: Pedro Zelaya MD;  Location: [facility];  Service: Cardiovascular;  Laterality: N/A;  Ranexa 500 mg on admit    COLONOSCOPY      TOTAL HIP ARTHROPLASTY Left 2018    Hip Replacement       Family History:     Family History   Problem Relation Name Age of Onset    Other (stroke syndrome) Mother Marcia     Stroke Mother Marcia     Lung cancer Father      No Known Problems Other         Social History:     Social History     Tobacco Use    Smoking status: Former     Packs/day: 1.00     Years: 36.00     Additional pack years: 0.00     Total pack years: 36.00     Types: Cigarettes     Start date: 1964     Quit date: 2000     Years since quittin.1    Smokeless tobacco: Never    Tobacco comments:     I quit smoking many many times over the years so i didnt smoke continually for 36 years notes above   Vaping Use    Vaping Use: Never used   Substance Use Topics    Alcohol use: Yes     " Alcohol/week: 2.0 standard drinks of alcohol     Types: 2 Standard drinks or equivalent per week     Comment: Occasional and always socially    Drug use: Never       CURRENT INPATIENT MEDICATIONS    amiodarone, 200 mg, oral, BID  amiodarone, 400 mg, oral, Once  [Held by provider] amLODIPine, 2.5 mg, oral, Daily  aspirin, 81 mg, oral, Daily  atorvastatin, 40 mg, oral, Daily  cholecalciferol, 2,000 Units, oral, Daily  docusate sodium, 100 mg, oral, TID  furosemide, 40 mg, intravenous, Daily  guaiFENesin, 1,200 mg, oral, BID  insulin lispro, 0-15 Units, subcutaneous, Before meals & nightly  ipratropium-albuteroL, 3 mL, nebulization, TID  iron polysaccharides, 150 mg, oral, Daily  lidocaine, 1 patch, transdermal, q24h  lidocaine, 1 patch, transdermal, Daily  magnesium oxide, 400 mg, oral, Daily  metoprolol tartrate, 12.5 mg, oral, BID  pantoprazole, 40 mg, oral, Daily before breakfast  polyethylene glycol, 17 g, oral, BID  potassium chloride CR, 20 mEq, oral, BID with meals         Current Outpatient Medications   Medication Instructions    amLODIPine (NORVASC) 2.5 mg, oral, Daily    aspirin 81 mg, oral, Daily    atorvastatin (LIPITOR) 40 mg, oral, Daily    benazepriL-hydrochlorothiazide (Lotensin HCT) 20-25 mg tablet 1 tablet, oral, Daily    cholecalciferol (Vitamin D-3) 50 MCG (2000 UT) tablet 1 tablet, oral, Daily    mupirocin (Bactroban) 2 % ointment Topical, 2 times daily, TWICE ADAY  SINCE SUNDAY 2/18/24    mv-min/FA/vit K/lutein/zeaxant (PRESERVISION AREDS 2 PLUS MV ORAL) oral    nitroglycerin (NITROSTAT) 0.4 mg, sublingual, Every 5 min PRN, May repeat every 5 minutes for up to 3 doses.    pantoprazole (PROTONIX) 40 mg, oral, Daily before breakfast, Do not crush, chew, or split.        Review of Systems:      12 point review of systems was obtained in detail and is negative other than that detailed above.    Vital Signs:     Vitals:    02/26/24 0435 02/26/24 0642 02/26/24 0711 02/26/24 0819   BP: 101/64   110/59    BP Location:       Patient Position:       Pulse: 66   70   Resp:       Temp: 36.3 °C (97.3 °F)   36.2 °C (97.2 °F)   TempSrc:       SpO2: 93%  93% 92%   Weight:  83 kg (182 lb 15.7 oz)     Height:           Intake/Output Summary (Last 24 hours) at 2/26/2024 1136  Last data filed at 2/25/2024 1930  Gross per 24 hour   Intake 591.69 ml   Output 900 ml   Net -308.31 ml       Wt Readings from Last 4 Encounters:   02/26/24 83 kg (182 lb 15.7 oz)   02/08/24 82.6 kg (182 lb)   02/02/24 83 kg (182 lb 15.7 oz)   01/31/24 82.7 kg (182 lb 6.4 oz)       Physical Examination:     GENERAL APPEARANCE: Well developed, well nourished, in no acute distress.  CHEST: Chest incisions dressings clean and dry  INTEGUMENT: Skin warm and dry, without gross excoriationis or lesions.  HEENT: No gross abnormalities of conjunctiva, teeth, gums, oral mucosa  NECK: Supple, no JVD, no bruit. Thyroid not palpable. Carotid upstrokes normal.  NEURO/PSHCY: Alert and oriented x3; appropriate behavior and responses and responses, grossly normal cerebellar function with normal balance and coordination  LUNGS: Clear to auscultation bilaterally; normal respiratory effort.  HEART: Rate and rhythm regular with no evident murmur; no gallop appreciated. There are no rubs, clicks or heaves. PMI nondisplaced.  ABDOMEN: Soft, nontender, no palpable hepatosplenomegaly, no mases, no bruits. Abdominal aorta not noted to be enlarged.  MUSCULOSKELETAL: Ambulatory with normal tandem gait.  EXTREMITIES: Warm with good color, no clubbing or cyanois. There is no edema noted.  PERIPHERAL VASCULAR: Pulses present and equally palpable; 2+ throughout. No femoral bruits.  Left leg dressings clean and dry      Lab:     CBC:   Results from last 7 days   Lab Units 02/26/24  0420 02/25/24  0333 02/24/24  0407   WBC AUTO x10*3/uL 6.7 7.5 9.1   RBC AUTO x10*6/uL 2.82* 2.62* 2.76*   HEMOGLOBIN g/dL 8.9* 8.5* 8.8*   HEMATOCRIT % 26.9* 25.1* 26.1*   MCV fL 95 96 95   MCH pg 31.6  32.4 31.9   MCHC g/dL 33.1 33.9 33.7   RDW % 13.0 12.8 12.9   PLATELETS AUTO x10*3/uL 129* 83* 83*     CMP:    Results from last 7 days   Lab Units 02/26/24  0420 02/25/24  0333 02/24/24  0407 02/22/24  1324 02/20/24  0923   SODIUM mmol/L 139 138 134*   < > 141   POTASSIUM mmol/L 3.9 3.5 4.1   < > 3.5   CHLORIDE mmol/L 105 106 102   < > 101   CO2 mmol/L 28 23 26   < > 30   BUN mg/dL 21 20 27*   < > 14   CREATININE mg/dL 0.92 0.76 1.00   < > 0.89   GLUCOSE mg/dL 100* 209* 119*   < > 101*   PROTEIN TOTAL g/dL  --   --   --   --  7.0   CALCIUM mg/dL 8.7 7.2* 8.8   < > 9.6   BILIRUBIN TOTAL mg/dL  --   --   --   --  0.9   ALK PHOS U/L  --   --   --   --  60   AST U/L  --   --   --   --  19   ALT U/L  --   --   --   --  18    < > = values in this interval not displayed.     BMP:    Results from last 7 days   Lab Units 02/26/24 0420 02/25/24  0333 02/24/24  0407   SODIUM mmol/L 139 138 134*   POTASSIUM mmol/L 3.9 3.5 4.1   CHLORIDE mmol/L 105 106 102   CO2 mmol/L 28 23 26   BUN mg/dL 21 20 27*   CREATININE mg/dL 0.92 0.76 1.00   CALCIUM mg/dL 8.7 7.2* 8.8   GLUCOSE mg/dL 100* 209* 119*     Magnesium:  Results from last 7 days   Lab Units 02/26/24  0420 02/25/24  0333 02/24/24  0407   MAGNESIUM mg/dL 2.20 1.89 2.34     Troponin:      BNP:     Lipid Panel:         Diagnostic Studies:   @No results found for this or any previous visit.    XR chest 1 view    Result Date: 2/23/2024  Interpreted By:  Sohan Knight, STUDY: XR CHEST 1 VIEW  2/23/2024 7:21 pm   INDICATION: Signs/Symptoms:chest tubes removed   COMPARISON: 02/23/2024   ACCESSION NUMBER(S): [id number]   ORDERING CLINICIAN: JACINTO KIM   TECHNIQUE: A single AP portable radiograph of the chest was obtained.   FINDINGS: Multiple cardiac monitoring leads are seen over the chest.  Sternal wires and mediastinal surgical clips are present. A right internal jugular central venous catheter is present, with the tip overlying the superior vena cava. There has been interval  removal of the bilateral chest tubes and mediastinal drains. No focal infiltrate, pleural effusion or pneumothorax is identified. The cardiac silhouette is enlarged, similar to prior studies.       No evidence of pneumothorax status post chest tube removal.   MACRO: None.   Signed by: Sohan Knight 2/23/2024 7:40 PM Dictation workstation:   [id number]    XR chest 1 view    Result Date: 2/23/2024  Interpreted By:  Lilian Ray, STUDY: XR CHEST 1 VIEW; 2/23/2024 9:24 am   INDICATION: Signs/Symptoms:Post-op   COMPARISON: Radiographs 03/22/2024   ACCESSION NUMBER(S): [id number]   ORDERING CLINICIAN: RANDALL ROSAS   TECHNIQUE: Single frontal view of the chest performed.   FINDINGS: LINES AND DEVICES: Removed NG and endotracheal tubes. Stable right IJ PA catheter. Stable bilateral chest tubes and mediastinal drain.   LUNGS: Stable trace bilateral pleural effusions and left basilar airspace opacities. No new focal consolidation, pulmonary edema or pneumothorax.   CARDIOMEDIASTINAL SILHOUETTE: The cardiomediastinal silhouette is stable.       Removal of NG and endotracheal tubes. No significant change otherwise with trace bilateral pleural effusions and left lower lung airspace opacities. No pneumothorax.   MACRO None   Signed by: Lilian Ray 2/23/2024 10:59 AM Dictation workstation:   [id number]    ECG 12 lead    Result Date: 2/23/2024  Normal sinus rhythm RSR' or QR pattern in V1 suggests right ventricular conduction delay Cannot rule out Inferior infarct , age undetermined Abnormal ECG When compared with ECG of 22-FEB-2024 13:37, (unconfirmed) RSR' pattern in V1 has replaced Right bundle branch block    ECG 12 Lead    Result Date: 2/22/2024  Sinus rhythm with 1st degree AV block Right bundle branch block Abnormal ECG When compared with ECG of 02-FEB-2024 06:30, Right bundle branch block is now Present    XR chest 1 view    Result Date: 2/22/2024  Interpreted By:  Arnoldo Queen, STUDY: XR CHEST 1 VIEW;   2/22/2024 1:27 pm   INDICATION: Signs/Symptoms:Post-op.   COMPARISON: 7:49 a.m.   ACCESSION NUMBER(S): [id number]   ORDERING CLINICIAN: RANDALL ROSAS   TECHNIQUE: A portable radiograph of the chest is performed.   FINDINGS: Postoperative changes are now identified in the form midline sternotomy wires vascular clips. An endotracheal tube is in place with the tip 2.5 cm above the fercho. A Traer-Jun catheter is again noted with the tip of the catheter overlying the right pulmonary artery. Bilateral chest tubes are identified in the lower thorax. A nasogastric tube projects into the stomach.   The heart is mildly enlarged. There is mild central vascular prominence. A small component of bibasilar atelectasis is suspected. There is no sizable pleural effusion or pneumothorax. The osseous structures are diffusely demineralized.       The tubes and lines are well positioned.   Mild cardiomegaly and central vascular prominence.   Small component of bibasilar atelectasis.   Continued clinical and radiographic follow-up is recommended.   Signed by: Arnoldo Queen 2/22/2024 1:43 PM Dictation workstation:   [id number]    XR chest 1 view    Result Date: 2/22/2024  Interpreted By:  Frederic Patel, STUDY: XR CHEST 1 VIEW;  2/22/2024 7:55 am   INDICATION: Signs/Symptoms:post lines cabg.   COMPARISON: None.   ACCESSION NUMBER(S): [id number]   ORDERING CLINICIAN: CLIFFORD MATA   FINDINGS: CARDIOMEDIASTINAL SILHOUETTE AND VASCULATURE:   Cardiac size:  Within normal limits.   Aortic shadow:  Within normal limits.   Mediastinal contours: Within normal limits.   Pulmonary vasculature:  The central vasculature is unremarkable   LUNGS: Several calcified pleural plaques best seen the left mid lung and hemidiaphragms probably from asbestosis exposure. Considering this lungs otherwise appear clear without discrete infiltrate.   ABDOMEN AND OTHER FINDINGS: A swans Jun catheter extends to the right main pulmonary artery. No complication  such as pneumothorax.   BONES: No acute osseous changes.       1.  No active cardiopulmonary disease.   Signed by: Frederic Patel 2/22/2024 10:08 AM Dictation workstation:   [id number]      No echocardiogram results found for the past 14 days    Radiology:     Cardiac Cath Post Procedure Notes:  Post Procedure Diagnosis: Double vessel disease.  Blood Loss:               Estimated blood loss during the procedure was 0 mls.  Specimens Removed:        Number of specimen(s) removed: none.     ____________________________________________________________________________________  CONCLUSIONS:   1. Distal Left Main: 10-30% stenosis.   2. Proximal LAD Lesion: The percent stenosis is 80%.   3. Mid LAD Lesion: The percent stenosis is 40%.   4. Entire CX Lesion: The percent stenosis is <10%.   5. Distal and proximal RCA Lesion: The percent stenosis is 10-30%.   6. Prox PLVB RCA Lesion: The percent stenosis is 90 %.   7. The Left Ventricular Ejection Fraction is 60%.     ICD 10 Codes:  Atherosclerotic heart disease of native coronary artery without angina pectoris-I25.10     CPT Codes:  Left Heart Cath (visualization of coronaries) and LV-50438; Moderate Sedation Services initial 15 minutes patient >5 years-[id number]     [id number] Pedro Cuellar MD  Performing Physician  Electronically signed by [id number] Pedro Cuellar MD on 2/2/2024 at 8:35:08 AM        [place] [address]   Tel [phone] Fax [phone]     TRANSTHORACIC ECHOCARDIOGRAM REPORT        Patient Name:     TANIA Suazo Physician:  [id number] Gennaro Watson MD  Study Date:       2/2/2024            Ordering Provider:  [id number] JACINTO KIM  MRN/PID:          [id number]            Fellow:  Accession#:       [id number]        Nurse:              Pietro Roa RN  Date of           1945 / 78      Sonographer:        Nneka CERDA  Birth/Age:         years  Gender:           M                   Additional Staff:  Height:           167.64 cm           Admit Date:         2/2/2024  Weight:           82.56 kg            Admission Status:   Outpatient  BSA:              1.92 m2             Department          [facility] Echo                                        Location:           Lab  Blood Pressure: 138 /79 mmHg     Study Type:    TRANSTHORACIC ECHO (TTE) COMPLETE  Diagnosis/ICD: Atherosclerotic heart disease of native coronary artery without                 angina pectoris-I25.10; Abnormal findings on diagnostic imaging                 of heart and coronary circulation-R93.1; Encounter for                 preprocedural cardiovascular examination-Z01.810  Indication:    PRE-OP CABG  CPT Codes:     Echo Complete w Full Doppler-24027     Patient History:  Valve Disorders:   Mitral Regurgitation and Tricuspid Regurgitation.  Pertinent History: CAD, Hyperlipidemia and HTN.     Study Detail: The following Echo studies were performed: 2D, M-Mode, Doppler and                color flow. Definity used as a contrast agent for endocardial                border definition. Total contrast used for this procedure was 1 mL                via IV push. The patient was awake.        PHYSICIAN INTERPRETATION:  Left Ventricle: Left ventricular systolic function is normal, with an estimated ejection fraction of 60-65%. There are no regional wall motion abnormalities. The left ventricular cavity size is normal. There is mild concentric left ventricular hypertrophy. Spectral Doppler shows an impaired relaxation pattern of left ventricular diastolic filling.  Left Atrium: The left atrium is upper limits of normal in size.  Right Ventricle: The right ventricle is mildly enlarged. There is normal right ventricular global systolic function.  Right Atrium: The right atrium is normal in size.  Aortic Valve: The aortic valve is trileaflet. There is trace to mild aortic valve  regurgitation. The peak instantaneous gradient of the aortic valve is 6.5 mmHg. The mean gradient of the aortic valve is 3.0 mmHg.  Mitral Valve: The mitral valve is normal in structure. There is no evidence of mitral valve regurgitation.  Tricuspid Valve: The tricuspid valve is structurally normal. There is mild tricuspid regurgitation.  Pulmonic Valve: The pulmonic valve is not well visualized. There is no indication of pulmonic valve regurgitation.  Pericardium: There is no pericardial effusion noted.  Aorta: The aortic root is normal.  Systemic Veins: The inferior vena cava appears to be of normal size. There is IVC inspiratory collapse greater than 50%.        CONCLUSIONS:   1. Left ventricular systolic function is normal with a 60-65% estimated ejection fraction.   2. Spectral Doppler shows an impaired relaxation pattern of left ventricular diastolic filling.   3. Mild tricuspid regurgitation is visualized.  Problem List:     Patient Active Problem List   Diagnosis    History of squamous cell carcinoma    Allergic rhinitis    Atypical nevus    Benign hypertension    Cataract, nuclear sclerotic, both eyes    Diverticulosis of colon    Enlarged prostate with lower urinary tract symptoms (LUTS)    Eustachian tube dysfunction    Exudative age-related macular degeneration of both eyes with active choroidal neovascularization (CMS/HCC)    GERD (gastroesophageal reflux disease)    HLD (hyperlipidemia)    Hordeolum externum (stye)    Hyperbilirubinemia    Inflamed skin tag    Internal hordeolum of left eye    Macular degeneration    Both eyes affected by degenerative myopia with choroidal neovascularization    Osteoarthrosis    BMI 28.0-28.9,adult    Vitamin D deficiency    Pencilling of stools    Encounter for screening for malignant neoplasm of colon    Cyst of bone of right hand    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, bilateral    Elevated coronary artery calcium score    CAD (coronary artery  disease)    Family history of ischemic heart disease    Former cigarette smoker    S/P coronary artery bypass graft x 3     Creation Bypass Graft Coronary ArteryX3(LIMA-LAD, SVG-PL, SVG-DIAG); EVH, LAUREN, Operative Note     Date: 2024                      OR Location: [facility] OR     Name: Sylvester Carvalho, : 1945, Age: 78 y.o., MRN: [id number], Sex: male     Diagnosis  Pre-op Diagnosis     * Coronary artery disease involving native coronary artery of native heart, unspecified whether angina present [I25.10] Post-op Diagnosis     * Coronary artery disease involving native coronary artery of native heart, unspecified whether angina present [I25.10]      Procedures  Creation Bypass Graft Coronary ArteryX3(LIMA-LAD, SVG-PL, SVG-DIAG); EVH, LAUREN,  [id number] - SD CABG W/ARTERIAL GRAFT TWO ARTERIAL GRAFTS        Surgeons      * Amarilys Chung - Primary     Resident/Fellow/Other Assistant:  Surgeon(s) and Role:     * Hugo Johnston MD - Assisting     Procedure Summary  Anesthesia: General               ASA: IV  Anesthesia Staff: Anesthesiologist: Venkata Hagen MD  CRNA: NATE Crews-CRNA  Perfusionist: Tabatha Laird  Estimated Blood Loss: 250 mL  Intra-op Medications:       Administrations occurring from 0730 to 1225 on 24:   Medication Name Total Dose   papaverine injection 60 mg   sodium chloride 0.9 % irrigation solution 4,000 mL   vancomycin (Vancocin) vial for injection 4 g   heparin 10,000 Units in sodium chloride 0.9 % 1,000 mL irrigation 2,000 mL   aminocaproic acid (Amicar) infusion 10.42 g   heparin 1,000 unit/mL injection 32,000 Units 32,000 Units   protamine 420 mg in sodium chloride 0.9% 50 mL  mg   vancomycin (Vancocin) in dextrose 5 % water (D5W) 250 mL IV 1,250 mg 1.25 g                   Anesthesia Record                   Intraprocedure I/O Totals                    Intake     Transfuse Platelets 284.00 mL     LR bolus 1500.00 mL     NaCl 0.9 % bolus 1000.00 mL      Norepinephrine Drip 0.00 mL     The total shown is the total volume documented since Anesthesia Start was filed.     Aminocaproic Acid Drip 0.00 mL     The total shown is the total volume documented since Anesthesia Start was filed.     Phenylephrine Drip 0.00 mL     The total shown is the total volume documented since Anesthesia Start was filed.     protamine 420 mg in sodium chloride 0.9% 50 mL IV 92.00 mL     Cell Saver 372 mL     Total Intake 3248 mL           Output     Urine 575 mL     Total Output 575 mL           Net     Net Volume 2673 mL             Specimen: No specimens collected      Staff:   Circulator: Pearl Alva RN  Relief Circulator: Camron Ho RN  Scrub Person: TAMIA Reyes        Tourniquet Times:          Implants:  Implants         Type Name Action Serial No.       Other Cardiac Implant CARDIOPLEGIA SET - [id number] Implanted                       Indications: Sylvester Carvalho is an 78 y.o. male who is having surgery for coronary artery disease  The patient was seen in the preoperative area. The risks, benefits, complications, treatment options, non-operative alternatives, expected recovery and outcomes were discussed with the patient. The possibilities of reaction to medication, pulmonary aspiration, injury to surrounding structures, bleeding, recurrent infection, the need for additional procedures, failure to diagnose a condition, and creating a complication requiring transfusion or operation were discussed with the patient. The patient concurred with the proposed plan, giving informed consent.  The site of surgery was properly noted/marked if necessary per policy. The patient has been actively warmed in preoperative area. Preoperative antibiotics have been ordered and given within 1 hours of incision. Venous thrombosis prophylaxis are not indicated.     Procedure Details: This is a PREOPERATIVE DIAGNOSIS:  Pre-Op Diagnosis Codes:     * Coronary artery disease involving  native coronary artery of native heart, unspecified whether angina present [I25.10]     POSTOPERATIVE DIAGNOSIS:  same     OPERATION/PROCEDURE:  1. Creation Bypass Graft Coronary ArteryX3(LIMA-LAD, SVG-PL, SVG-DIAG); EVH, LAUREN,      SURGEON:     * Amarilys Chung - Primary     * Hugo Johnston - Assisting   There was no qualified resident available so Dr. Dumont was asked to assist, he performed the sternotomy harvested the mammary artery assisted through the whole case and did the proximal anastomosis of the vein to the posterolateral artery on the aorta        ANESTHESIA TYPE:  General     ANESTHESIOLOGIST:  Procedure(s) and Anesthesia Type:     * Creation Bypass Graft Coronary ArteryX3(LIMA-LAD, SVG-PL, SVG-DIAG); EVH, LAUREN, - General     ANESTHESIA STAFF  Anesthesiologist: Venkata Hagen MD  CRNA: NATE Crews-CRNA  Perfusionist: Tabatha [place]      BYPASS:  78 minutes     CROSSCLAMP:  70     EBL:     250 mL      OPERATIVE INDICATIONS:  The patient is a 78-year-old with symptomatic coronary artery disease.  Coronary angiography demonstrated 3 vessels coronary artery disease.  Echocardiography demonstrated normal left ventricular function and no significant valvular heart disease.  The patient was referred for coronary artery bypass grafting.     OPERATIVE FINDINGS:  There were no pericardial adhesions or effusions.  The ascending aorta was soft without evidence of atherosclerosis. The patient had severe diffuse coronary artery disease, however we were able to find locations on the coronary arteries that was suitable for grafting.     OPERATIVE PROCEDURE:  The patient was brought to the operating room, placed on the operating table in supine position.  General endotracheal anesthesia and hemodynamic monitoring were established.  The patient was prepped and draped in standard fashion. A median sternotomy was performed.  The LIMA was harvested in a usual manner.   The vein was harvested  endoscopically from the left lower limb.  The thymus was divided and the pericardium was opened.  The ascending aorta was palpated and it was without evidence of atherosclerosis.  The patient was heparinized.  The ascending aorta and right atrium were cannulated for cardiopulmonary bypass and antegrade and retrograde cardioplegia cannulas were placed.  The patient was placed on cardiopulmonary bypass, the ascending aorta was crossclamped, and the heart was arrested and protected with cold blood cardioplegia.  During the remainder of the cross-clamp time, cold blood cardioplegia was administered every 15 to 20 minutes.       GRAFTS:   LIMA- LAD  SVG- D1  SVG-PL of RCA        Graft 1, PL:  This vessel was opened proximally, which was 1.5 mm in diameter, free from disease at the point of entry.  A length of reverse saphenous vein was anastomosed end-to-side using continuous 7-0 Prolene.      Graft 2, D1:    This vessel was opened in its mid course which was 1.5 mm in diameter, free from disease at the point of entry. The length of reverse saphenous vein was anastomosed end-to-side using continuous 7-0 Prolene.      Graft 3, LAD:    This vessel was opened in its mid to distal third, which was 1.5 mm in diameter, free from disease at the point of entry. The LIMA was anastomosed end-to-side using continuous 8-0 Prolene.      The proximal end of the vein grafts was anastomosed to punch holes into the ascending aorta using continuous 6-0 Prolene sutures.      Assessment of graft flows demonstrated good flows in all the grafts.     Cardiopulmonary bypass was weaned uneventfully.  One right ventricular pacing wires were applied. Protamine was administered to reverse systemic anticoagulation. Hemostasis was secured.      Two drains were inserted, 1 in the left pleural space and 1 in the mediastinum.  The sternum was closed with interrupted stainless steel wires and subcutaneous layers were closed using Vicryl and skin was  closed using subcuticular Vicryl.       Complications:  None; patient tolerated the procedure well.    Disposition: ICU - intubated and hemodynamically stable.  Condition: stable         Attending Attestation: I was present and scrubbed for the entire procedure.     Amarilys Chung  Phone Number: [phone]  Assessment:   78-year-old gentleman seen evaluated bedside in the telemetry unit in conjunction with Jannette Coy RN, CNP.    Bedside examination evaluation performed by me    Chart review detail discussed the patient and his family and the staff.    Impression:  CAD status post CABG postop day 4  Hypertension  Dyslipidemia  EF 50%  Paroxysmal atrial fibrillation    Plan:   Recommendation:        Ef 50 %  Aspirin 81 daily  Lopressor 12.5 mg p.o. twice daily  Mag oxide mg 400 daily  Lipitor 40 mg daily  Daily EKGs  Continue amiodarone  Continue telemetry  Potential discharge in 48 hours  Follow-up with [facility] office in 4 weeks    Arnoldo Lin DO,[facility]     Discussion:  This 78-year-old gentleman underwent successful revascularization with LIMA to the LAD and vein graft to the posterolateral branch and vein graft to the diagonal.  He had some transient A-fib which is being treated.  In discussions with the patient and family he is doing quite well.  Also discussed with the thoracic team who plans to discharge within 24 to 48 hours.  He will be followed up in our office in 3 to 4 weeks after discharge.  Will continue current meds which are as listed.  Will continue to follow while hospitalized.    Brooks Ho CNP  [facility]      Of note, this documentation is completed using the Dragon Dictation system (voice recognition software). There may be spelling and/or grammatical errors that were not corrected prior to final submission.      Electronically signed by Arnoldo Lin DO, on 2/26/2024 at 11:36 AM

## 2024-02-26 NOTE — NURSING NOTE
Pt having frequent PVC's with occasional bigeminy through the night.  RN had lab draw levels at 0400.  Potassium a little low at 3.9.  20 mEq tablet given per protocol.  Will have a recheck in 6 hours.  Pt asymptomatic.  Continue to monitor.  Call light within reach.  Bed alarm on.

## 2024-02-26 NOTE — PROGRESS NOTES
Sylvester Carvalho is a 78 y.o. male on day 4 of admission presenting with CAD (coronary artery disease).    Subjective   No acute overnight events.  This morning is afebrile, normotensive, maintaining sinus rhythm with no recurring atrial arrhythmias, maintaining adequate saturation on 2 L nasal cannula.  Chest x-ray showing diminished inspiratory volumes bibasilar atelectasis, left more prominent than right.  Spirometry volumes 750 to 1000 mL.  Responding to diuresis, weight trending down, nearing baseline preoperative weight, 24-hour fluid balance -300 mL.  Will continue diuresis.  Postoperative pain is well-controlled with use of Tylenol, patient is reluctant to use narcotics for pain, tramadol added as alternative.  Tolerating diet, moving bowels.  Ambulating with spot assistance and use of walker.  Plan for discharge 24 to 48 hours depending on response to continue diuresis and pain control.  Anticipate home with home physical therapy, Occupational Therapy and skilled nursing services.       Objective     Physical Exam  Constitutional:       General: He is not in acute distress.     Appearance: Normal appearance.   HENT:      Head: Normocephalic.      Nose: Nose normal.      Mouth/Throat:      Mouth: Mucous membranes are moist.   Eyes:      Pupils: Pupils are equal, round, and reactive to light.   Cardiovascular:      Rate and Rhythm: Normal rate and regular rhythm.      Pulses: Normal pulses.      Heart sounds: No murmur heard.     No friction rub.      Comments: Epicardial wires clipped at skin  Pulmonary:      Comments: Sternum stable  Sternotomy well-approximated without erythema  Chest tube sites without erythema or drainage  Diminished inspiratory effort spirometry volumes 750 mL  Bilateral breath sounds with faint crackles at bases otherwise clear to auscultation  Abdominal:      General: Bowel sounds are normal.      Comments: LBM 2/26   Genitourinary:     Comments: Voiding clear yellow  Musculoskeletal:     "     General: Normal range of motion.      Cervical back: Normal range of motion.      Right lower leg: No edema.      Left lower leg: No edema.   Skin:     General: Skin is warm and dry.      Capillary Refill: Capillary refill takes less than 2 seconds.      Comments: Left lower extremity EVH incisions well-approximated without erythema or drainage   Neurological:      General: No focal deficit present.      Mental Status: He is alert and oriented to person, place, and time.   Psychiatric:         Mood and Affect: Mood normal.         Last Recorded Vitals  Blood pressure 110/59, pulse 70, temperature 36.2 °C (97.2 °F), resp. rate 17, height 1.676 m (5' 6\"), weight 83 kg (182 lb 15.7 oz), SpO2 92 %.  Intake/Output last 3 Shifts:  I/O last 3 completed shifts:  In: 1222.9 (14.7 mL/kg) [P.O.:360; I.V.:407.9 (4.9 mL/kg); IV Piggyback:455]  Out: 2075 (25 mL/kg) [Urine:2075 (0.7 mL/kg/hr)]  Weight: 83 kg     Relevant Results  Scheduled medications  amiodarone, 200 mg, oral, BID  [Held by provider] amLODIPine, 2.5 mg, oral, Daily  aspirin, 81 mg, oral, Daily  atorvastatin, 40 mg, oral, Daily  cholecalciferol, 2,000 Units, oral, Daily  docusate sodium, 100 mg, oral, TID  furosemide, 40 mg, intravenous, Daily  guaiFENesin, 1,200 mg, oral, BID  insulin lispro, 0-15 Units, subcutaneous, Before meals & nightly  ipratropium-albuteroL, 3 mL, nebulization, TID  iron polysaccharides, 150 mg, oral, Daily  lidocaine, 1 patch, transdermal, q24h  lidocaine, 1 patch, transdermal, Daily  magnesium oxide, 400 mg, oral, Daily  metoprolol tartrate, 12.5 mg, oral, BID  pantoprazole, 40 mg, oral, Daily before breakfast  polyethylene glycol, 17 g, oral, BID  potassium chloride CR, 20 mEq, oral, BID with meals      Continuous medications     PRN medications  PRN medications: acetaminophen, dextrose **OR** glucagon, ipratropium-albuteroL, magnesium sulfate, magnesium sulfate, naloxone, nicotine, oxyCODONE, oxygen, potassium chloride CR, " potassium chloride CR, tiZANidine, traMADol   Results for orders placed or performed during the hospital encounter of 02/22/24 (from the past 24 hour(s))   POCT GLUCOSE   Result Value Ref Range    POCT Glucose 103 (H) 74 - 99 mg/dL   POCT GLUCOSE   Result Value Ref Range    POCT Glucose 112 (H) 74 - 99 mg/dL   ECG 12 lead   Result Value Ref Range    Ventricular Rate 66 BPM    Atrial Rate 66 BPM    OK Interval 176 ms    QRS Duration 86 ms    QT Interval 424 ms    QTC Calculation(Bazett) 444 ms    P Axis 36 degrees    R Axis 34 degrees    T Axis 18 degrees    QRS Count 11 beats    Q Onset 228 ms    P Onset 140 ms    P Offset 188 ms    T Offset 440 ms    QTC Fredericia 438 ms   POCT GLUCOSE   Result Value Ref Range    POCT Glucose 111 (H) 74 - 99 mg/dL   CBC   Result Value Ref Range    WBC 6.7 4.4 - 11.3 x10*3/uL    nRBC 0.0 0.0 - 0.0 /100 WBCs    RBC 2.82 (L) 4.50 - 5.90 x10*6/uL    Hemoglobin 8.9 (L) 13.5 - 17.5 g/dL    Hematocrit 26.9 (L) 41.0 - 52.0 %    MCV 95 80 - 100 fL    MCH 31.6 26.0 - 34.0 pg    MCHC 33.1 32.0 - 36.0 g/dL    RDW 13.0 11.5 - 14.5 %    Platelets 129 (L) 150 - 450 x10*3/uL   Magnesium   Result Value Ref Range    Magnesium 2.20 1.60 - 2.40 mg/dL   Renal Function Panel   Result Value Ref Range    Glucose 100 (H) 74 - 99 mg/dL    Sodium 139 136 - 145 mmol/L    Potassium 3.9 3.5 - 5.3 mmol/L    Chloride 105 98 - 107 mmol/L    Bicarbonate 28 21 - 32 mmol/L    Anion Gap 10 10 - 20 mmol/L    Urea Nitrogen 21 6 - 23 mg/dL    Creatinine 0.92 0.50 - 1.30 mg/dL    eGFR 85 >60 mL/min/1.73m*2    Calcium 8.7 8.6 - 10.3 mg/dL    Phosphorus 2.9 2.5 - 4.9 mg/dL    Albumin 3.3 (L) 3.4 - 5.0 g/dL   ECG 12 lead   Result Value Ref Range    Ventricular Rate 61 BPM    Atrial Rate 61 BPM    OK Interval 182 ms    QRS Duration 82 ms    QT Interval 432 ms    QTC Calculation(Bazett) 434 ms    P Axis 38 degrees    R Axis 30 degrees    T Axis 39 degrees    QRS Count 11 beats    Q Onset 229 ms    P Onset 138 ms    P Offset  189 ms    T Offset 445 ms    QTC Fredericia 434 ms   POCT GLUCOSE   Result Value Ref Range    POCT Glucose 98 74 - 99 mg/dL    ECG 12 lead    Result Date: 2/26/2024  Sinus rhythm with occasional Premature ventricular complexes Nonspecific ST abnormality Abnormal ECG When compared with ECG of 25-FEB-2024 17:53, (unconfirmed) Premature ventricular complexes are now Present    ECG 12 lead    Result Date: 2/25/2024  Normal sinus rhythm Normal ECG When compared with ECG of 25-FEB-2024 17:52, (unconfirmed) No significant change was found    XR chest 1 view    Result Date: 2/25/2024  Interpreted By:  Марина Juan, STUDY: XR CHEST 1 VIEW;  2/25/2024 5:20 am   INDICATION: Signs/Symptoms:CABG.   COMPARISON: 02/24/2024   ACCESSION NUMBER(S): [id number]   ORDERING CLINICIAN: RANDALL ROSAS   FINDINGS: The cardiac silhouette is enlarged.   There is bilateral parenchymal infiltration. There appear to be calcified pleural plaques on the left.   A venous catheter is present on right with tip in the region of the superior vena cava. Sternal wires and clips are present.   COMPARISON OF FINDING: The chest is similar.       Pleural and parenchymal disease. Enlarged cardiac silhouette.   MACRO: none   Signed by: Марина Juan 2/25/2024 8:34 AM Dictation workstation:   [id number]    ECG 12 Lead    Result Date: 2/24/2024  Atrial fibrillation with rapid ventricular response Right bundle branch block Abnormal ECG When compared with ECG of 24-FEB-2024 07:15, (unconfirmed) Atrial fibrillation has replaced Sinus rhythm                      Assessment/Plan   Principal Problem:    CAD (coronary artery disease)  Active Problems:    S/P coronary artery bypass graft x 3    CAD  CABGx3 with LIMA-LAD, SVG-PL, SVG-Diag; Endoscopic vein harvest; intraoperative LAUREN.  -Discussed with Dr. Chung  -epicardial wires clipped at skin  -ASA and Statin daily  -Metoprolol 12.5mg BID  -continue diuresis; Lasix 40mg IVP x1  -Keep K>4 and Mag>2  -PRN Tylenol,  tramadol added as alternative to oxycodone  -Bowel regimen of Miralax and Colace  -cardiac diet; SSI TID-AC and HS; Goal BG<180  -Protonix for GI prophylaxis   -SCD's and ambulation for DVT prophylaxis; subcutaneous heparin on hold secondary to thrombocytopenia, now mobile and will not require pharmacological prophylaxis  -Aggressive pulmonary hygiene; wean supplemental oxygen as able for SpO2>90%  -Increase activity as tolerated; therapy following   -CBC, BMP, Mag, CXR, and EKG in AM     Pericarditis  Acute post operative pericarditis as evidenced by signifant pericardial rub and mild diffuse ST elevation on EKG. Initiated on Colchicine.  -Colchicine discontinued as rub and ST elevation have resolved.      Atrial Fibrillation  POD#2: Pt went into AFIB RVR with rates in 120s. Remained HDS with /84. Asymptomatic. Given Amiodarone bolus and started on drip per protocol. Pt converted back to sinus rhythm soon after Amio started. Episode lasted roughly an hour.   -PO Amiodarone 200mg BID  -daily EKG; follow QT  -keep K>4 and Mag>2  -telemetry monitoring   -no indication for OAC at this time  -Colchicine discontinued as interacts with Amiodarone      HTN; HLD  Lipid panel: Chol 164, HDL 36.9, LDL 89,   -Statin therapy  -Metoprolol 12.5mg BID  -optimize medical management as hemodynamics allow  -Cardiology consulted; appreciate assistance      Anemia due to blood loss; Acquired thrombocytopenia  Acute post operative anemia   Acquired thrombocytopenia 2/2 consumption and dilution; Given platelets x1 intraoperatively.   POD#1: Hgb 10.1 and Platelets 114k  POD#2: Hgb 8.8 and Platelets 83k. Suspect dilutional component as no evidence of active bleeding noted and LOS FB +6L  POD#3: Hgb 8.5 and Platelets 83k. No signs of active bleeding. FB + 5.6L   POD #4: Hemoglobin 8.9, platelets 129, no signs active bleeding  -monitor for signs of active bleeding  -continue diuresis; Lasix 40mg IVP x1. Will consider repeating  this afternoon pending response and hemodynamics   -Ok for daily ASA  -CBC in AM       I spent 45 minutes in the professional and overall care of this patient.      Sunni Storey, NATE-CNP

## 2024-02-26 NOTE — PROGRESS NOTES
02/26/24 1259   Discharge Planning   Home or Post Acute Services In home services   Type of Home Care Services Home OT;Home PT   Patient expects to be discharged to: Home with [facility]     Pt is s/p CABG x 3 on 2/22/24, PT/OT al [facility] scores PT (18) OT (19) and recommend continued therapy at low level/intensity. Pt DC preference remains home with [facility]. Per rounding report with CNP, ADOD is Tuesday or Wednesday. CT team will continue to monitor case for progression and DC planning.

## 2024-02-27 ENCOUNTER — APPOINTMENT (OUTPATIENT)
Dept: CARDIOLOGY | Facility: HOSPITAL | Age: 79
DRG: 236 | End: 2024-02-27
Payer: MEDICARE

## 2024-02-27 ENCOUNTER — HOSPITAL ENCOUNTER (INPATIENT)
Dept: CARDIOLOGY | Facility: HOSPITAL | Age: 79
Discharge: HOME | DRG: 236 | End: 2024-02-27
Payer: MEDICARE

## 2024-02-27 ENCOUNTER — APPOINTMENT (OUTPATIENT)
Dept: RADIOLOGY | Facility: HOSPITAL | Age: 79
DRG: 236 | End: 2024-02-27
Payer: MEDICARE

## 2024-02-27 LAB
ALBUMIN SERPL BCP-MCNC: 3.4 G/DL (ref 3.4–5)
ANION GAP SERPL CALC-SCNC: 13 MMOL/L (ref 10–20)
ATRIAL RATE: 61 BPM
ATRIAL RATE: 66 BPM
ATRIAL RATE: 67 BPM
ATRIAL RATE: 70 BPM
ATRIAL RATE: 72 BPM
ATRIAL RATE: 75 BPM
ATRIAL RATE: 91 BPM
BUN SERPL-MCNC: 26 MG/DL (ref 6–23)
CALCIUM SERPL-MCNC: 9.1 MG/DL (ref 8.6–10.3)
CHLORIDE SERPL-SCNC: 102 MMOL/L (ref 98–107)
CO2 SERPL-SCNC: 26 MMOL/L (ref 21–32)
CREAT SERPL-MCNC: 1.08 MG/DL (ref 0.5–1.3)
EGFRCR SERPLBLD CKD-EPI 2021: 70 ML/MIN/1.73M*2
ERYTHROCYTE [DISTWIDTH] IN BLOOD BY AUTOMATED COUNT: 12.9 % (ref 11.5–14.5)
GLUCOSE BLD MANUAL STRIP-MCNC: 106 MG/DL (ref 74–99)
GLUCOSE BLD MANUAL STRIP-MCNC: 106 MG/DL (ref 74–99)
GLUCOSE BLD MANUAL STRIP-MCNC: 119 MG/DL (ref 74–99)
GLUCOSE BLD MANUAL STRIP-MCNC: 125 MG/DL (ref 74–99)
GLUCOSE SERPL-MCNC: 106 MG/DL (ref 74–99)
HCT VFR BLD AUTO: 29.2 % (ref 41–52)
HGB BLD-MCNC: 9.8 G/DL (ref 13.5–17.5)
MAGNESIUM SERPL-MCNC: 2.14 MG/DL (ref 1.6–2.4)
MCH RBC QN AUTO: 32 PG (ref 26–34)
MCHC RBC AUTO-ENTMCNC: 33.6 G/DL (ref 32–36)
MCV RBC AUTO: 95 FL (ref 80–100)
NRBC BLD-RTO: 0 /100 WBCS (ref 0–0)
P AXIS: 34 DEGREES
P AXIS: 35 DEGREES
P AXIS: 36 DEGREES
P AXIS: 38 DEGREES
P AXIS: 44 DEGREES
P AXIS: 49 DEGREES
P AXIS: 56 DEGREES
P OFFSET: 174 MS
P OFFSET: 181 MS
P OFFSET: 182 MS
P OFFSET: 185 MS
P OFFSET: 188 MS
P OFFSET: 189 MS
P OFFSET: 192 MS
P ONSET: 118 MS
P ONSET: 130 MS
P ONSET: 130 MS
P ONSET: 133 MS
P ONSET: 138 MS
P ONSET: 139 MS
P ONSET: 140 MS
PHOSPHATE SERPL-MCNC: 4.4 MG/DL (ref 2.5–4.9)
PLATELET # BLD AUTO: 184 X10*3/UL (ref 150–450)
POTASSIUM SERPL-SCNC: 3.5 MMOL/L (ref 3.5–5.3)
PR INTERVAL: 176 MS
PR INTERVAL: 180 MS
PR INTERVAL: 182 MS
PR INTERVAL: 186 MS
PR INTERVAL: 190 MS
PR INTERVAL: 192 MS
PR INTERVAL: 218 MS
Q ONSET: 220 MS
Q ONSET: 223 MS
Q ONSET: 225 MS
Q ONSET: 227 MS
Q ONSET: 227 MS
Q ONSET: 228 MS
Q ONSET: 229 MS
QRS COUNT: 11 BEATS
QRS COUNT: 12 BEATS
QRS COUNT: 12 BEATS
QRS COUNT: 17 BEATS
QRS COUNT: 17 BEATS
QRS DURATION: 120 MS
QRS DURATION: 120 MS
QRS DURATION: 124 MS
QRS DURATION: 128 MS
QRS DURATION: 76 MS
QRS DURATION: 80 MS
QRS DURATION: 82 MS
QRS DURATION: 84 MS
QRS DURATION: 86 MS
QT INTERVAL: 310 MS
QT INTERVAL: 376 MS
QT INTERVAL: 394 MS
QT INTERVAL: 406 MS
QT INTERVAL: 420 MS
QT INTERVAL: 424 MS
QT INTERVAL: 432 MS
QT INTERVAL: 452 MS
QT INTERVAL: 460 MS
QTC CALCULATION(BAZETT): 409 MS
QTC CALCULATION(BAZETT): 434 MS
QTC CALCULATION(BAZETT): 439 MS
QTC CALCULATION(BAZETT): 444 MS
QTC CALCULATION(BAZETT): 444 MS
QTC CALCULATION(BAZETT): 453 MS
QTC CALCULATION(BAZETT): 477 MS
QTC CALCULATION(BAZETT): 482 MS
QTC CALCULATION(BAZETT): 501 MS
QTC FREDERICIA: 373 MS
QTC FREDERICIA: 424 MS
QTC FREDERICIA: 431 MS
QTC FREDERICIA: 434 MS
QTC FREDERICIA: 438 MS
QTC FREDERICIA: 442 MS
QTC FREDERICIA: 456 MS
QTC FREDERICIA: 469 MS
QTC FREDERICIA: 475 MS
R AXIS: -1 DEGREES
R AXIS: 29 DEGREES
R AXIS: 30 DEGREES
R AXIS: 34 DEGREES
R AXIS: 45 DEGREES
R AXIS: 47 DEGREES
R AXIS: 48 DEGREES
R AXIS: 7 DEGREES
R AXIS: 70 DEGREES
RBC # BLD AUTO: 3.06 X10*6/UL (ref 4.5–5.9)
SODIUM SERPL-SCNC: 137 MMOL/L (ref 136–145)
T AXIS: -2 DEGREES
T AXIS: 0 DEGREES
T AXIS: 12 DEGREES
T AXIS: 16 DEGREES
T AXIS: 18 DEGREES
T AXIS: 39 DEGREES
T AXIS: 4 DEGREES
T AXIS: 48 DEGREES
T AXIS: 52 DEGREES
T OFFSET: 382 MS
T OFFSET: 408 MS
T OFFSET: 422 MS
T OFFSET: 432 MS
T OFFSET: 439 MS
T OFFSET: 440 MS
T OFFSET: 445 MS
T OFFSET: 453 MS
T OFFSET: 453 MS
VENTRICULAR RATE: 105 BPM
VENTRICULAR RATE: 107 BPM
VENTRICULAR RATE: 61 BPM
VENTRICULAR RATE: 66 BPM
VENTRICULAR RATE: 66 BPM
VENTRICULAR RATE: 67 BPM
VENTRICULAR RATE: 70 BPM
VENTRICULAR RATE: 72 BPM
VENTRICULAR RATE: 75 BPM
WBC # BLD AUTO: 7.4 X10*3/UL (ref 4.4–11.3)

## 2024-02-27 PROCEDURE — 71045 X-RAY EXAM CHEST 1 VIEW: CPT | Performed by: RADIOLOGY

## 2024-02-27 PROCEDURE — 83735 ASSAY OF MAGNESIUM: CPT | Performed by: NURSE PRACTITIONER

## 2024-02-27 PROCEDURE — 93005 ELECTROCARDIOGRAM TRACING: CPT

## 2024-02-27 PROCEDURE — 2500000001 HC RX 250 WO HCPCS SELF ADMINISTERED DRUGS (ALT 637 FOR MEDICARE OP): Performed by: NURSE PRACTITIONER

## 2024-02-27 PROCEDURE — 82947 ASSAY GLUCOSE BLOOD QUANT: CPT

## 2024-02-27 PROCEDURE — 36415 COLL VENOUS BLD VENIPUNCTURE: CPT | Performed by: NURSE PRACTITIONER

## 2024-02-27 PROCEDURE — 99233 SBSQ HOSP IP/OBS HIGH 50: CPT | Performed by: NURSE PRACTITIONER

## 2024-02-27 PROCEDURE — 2500000002 HC RX 250 W HCPCS SELF ADMINISTERED DRUGS (ALT 637 FOR MEDICARE OP, ALT 636 FOR OP/ED): Performed by: NURSE PRACTITIONER

## 2024-02-27 PROCEDURE — 97530 THERAPEUTIC ACTIVITIES: CPT | Mod: GP,CQ

## 2024-02-27 PROCEDURE — 85027 COMPLETE CBC AUTOMATED: CPT | Performed by: NURSE PRACTITIONER

## 2024-02-27 PROCEDURE — 71045 X-RAY EXAM CHEST 1 VIEW: CPT

## 2024-02-27 PROCEDURE — 80069 RENAL FUNCTION PANEL: CPT | Performed by: NURSE PRACTITIONER

## 2024-02-27 PROCEDURE — 2500000004 HC RX 250 GENERAL PHARMACY W/ HCPCS (ALT 636 FOR OP/ED): Performed by: NURSE PRACTITIONER

## 2024-02-27 PROCEDURE — 1100000001 HC PRIVATE ROOM DAILY

## 2024-02-27 PROCEDURE — 93010 ELECTROCARDIOGRAM REPORT: CPT | Performed by: INTERNAL MEDICINE

## 2024-02-27 PROCEDURE — 94640 AIRWAY INHALATION TREATMENT: CPT | Mod: MUE

## 2024-02-27 PROCEDURE — 99233 SBSQ HOSP IP/OBS HIGH 50: CPT | Performed by: INTERNAL MEDICINE

## 2024-02-27 PROCEDURE — 2500000002 HC RX 250 W HCPCS SELF ADMINISTERED DRUGS (ALT 637 FOR MEDICARE OP, ALT 636 FOR OP/ED): Performed by: THORACIC SURGERY (CARDIOTHORACIC VASCULAR SURGERY)

## 2024-02-27 RX ORDER — METOPROLOL TARTRATE 25 MG/1
25 TABLET, FILM COATED ORAL 2 TIMES DAILY
Status: DISCONTINUED | OUTPATIENT
Start: 2024-02-27 | End: 2024-02-28 | Stop reason: HOSPADM

## 2024-02-27 RX ORDER — AMIODARONE HYDROCHLORIDE 200 MG/1
200 TABLET ORAL ONCE
Status: COMPLETED | OUTPATIENT
Start: 2024-02-27 | End: 2024-02-27

## 2024-02-27 RX ORDER — METOPROLOL TARTRATE 25 MG/1
12.5 TABLET, FILM COATED ORAL ONCE
Status: COMPLETED | OUTPATIENT
Start: 2024-02-27 | End: 2024-02-27

## 2024-02-27 RX ORDER — AMIODARONE HYDROCHLORIDE 200 MG/1
400 TABLET ORAL 2 TIMES DAILY
Status: DISCONTINUED | OUTPATIENT
Start: 2024-02-27 | End: 2024-02-28 | Stop reason: HOSPADM

## 2024-02-27 RX ORDER — METOPROLOL TARTRATE 1 MG/ML
2.5 INJECTION, SOLUTION INTRAVENOUS EVERY 6 HOURS
Status: DISCONTINUED | OUTPATIENT
Start: 2024-02-27 | End: 2024-02-27

## 2024-02-27 RX ADMIN — PANTOPRAZOLE SODIUM 40 MG: 40 TABLET, DELAYED RELEASE ORAL at 04:48

## 2024-02-27 RX ADMIN — AMIODARONE HYDROCHLORIDE 400 MG: 200 TABLET ORAL at 22:01

## 2024-02-27 RX ADMIN — APIXABAN 5 MG: 5 TABLET, FILM COATED ORAL at 22:01

## 2024-02-27 RX ADMIN — AMIODARONE HYDROCHLORIDE 200 MG: 200 TABLET ORAL at 08:12

## 2024-02-27 RX ADMIN — METOPROLOL TARTRATE 12.5 MG: 25 TABLET, FILM COATED ORAL at 08:12

## 2024-02-27 RX ADMIN — AMIODARONE HYDROCHLORIDE 150 MG: 1.5 INJECTION, SOLUTION INTRAVENOUS at 10:44

## 2024-02-27 RX ADMIN — GUAIFENESIN 1200 MG: 600 TABLET, EXTENDED RELEASE ORAL at 22:00

## 2024-02-27 RX ADMIN — ATORVASTATIN CALCIUM 40 MG: 20 TABLET ORAL at 22:00

## 2024-02-27 RX ADMIN — POTASSIUM CHLORIDE 40 MEQ: 1500 TABLET, EXTENDED RELEASE ORAL at 08:59

## 2024-02-27 RX ADMIN — IPRATROPIUM BROMIDE AND ALBUTEROL SULFATE 3 ML: 2.5; .5 SOLUTION RESPIRATORY (INHALATION) at 07:32

## 2024-02-27 RX ADMIN — METOPROLOL TARTRATE 25 MG: 25 TABLET, FILM COATED ORAL at 22:00

## 2024-02-27 RX ADMIN — ASPIRIN 81 MG: 81 TABLET, CHEWABLE ORAL at 08:12

## 2024-02-27 RX ADMIN — POTASSIUM CHLORIDE 20 MEQ: 1500 TABLET, EXTENDED RELEASE ORAL at 16:34

## 2024-02-27 RX ADMIN — FUROSEMIDE 40 MG: 10 INJECTION, SOLUTION INTRAMUSCULAR; INTRAVENOUS at 08:12

## 2024-02-27 RX ADMIN — METOPROLOL TARTRATE 12.5 MG: 25 TABLET, FILM COATED ORAL at 08:59

## 2024-02-27 RX ADMIN — MAGNESIUM OXIDE 400 MG (241.3 MG MAGNESIUM) TABLET 400 MG: TABLET at 08:12

## 2024-02-27 RX ADMIN — AMIODARONE HYDROCHLORIDE 200 MG: 200 TABLET ORAL at 09:31

## 2024-02-27 RX ADMIN — Medication 2000 UNITS: at 08:12

## 2024-02-27 RX ADMIN — APIXABAN 5 MG: 5 TABLET, FILM COATED ORAL at 09:31

## 2024-02-27 RX ADMIN — POLYSACCHARIDE-IRON COMPLEX 150 MG: 150 CAPSULE ORAL at 08:12

## 2024-02-27 RX ADMIN — GUAIFENESIN 1200 MG: 600 TABLET, EXTENDED RELEASE ORAL at 08:22

## 2024-02-27 RX ADMIN — IPRATROPIUM BROMIDE AND ALBUTEROL SULFATE 3 ML: 2.5; .5 SOLUTION RESPIRATORY (INHALATION) at 13:29

## 2024-02-27 RX ADMIN — POTASSIUM CHLORIDE 20 MEQ: 1500 TABLET, EXTENDED RELEASE ORAL at 08:12

## 2024-02-27 ASSESSMENT — PAIN - FUNCTIONAL ASSESSMENT
PAIN_FUNCTIONAL_ASSESSMENT: 0-10

## 2024-02-27 ASSESSMENT — COGNITIVE AND FUNCTIONAL STATUS - GENERAL
MOBILITY SCORE: 18
MOVING TO AND FROM BED TO CHAIR: A LITTLE
MOVING FROM LYING ON BACK TO SITTING ON SIDE OF FLAT BED WITH BEDRAILS: A LITTLE
STANDING UP FROM CHAIR USING ARMS: A LITTLE
TURNING FROM BACK TO SIDE WHILE IN FLAT BAD: A LITTLE
CLIMB 3 TO 5 STEPS WITH RAILING: A LITTLE
WALKING IN HOSPITAL ROOM: A LITTLE

## 2024-02-27 ASSESSMENT — PAIN SCALES - GENERAL
PAINLEVEL_OUTOF10: 0 - NO PAIN

## 2024-02-27 NOTE — PROGRESS NOTES
Sylvester Carvalho is a 78 y.o. male on day 5 of admission presenting with CAD (coronary artery disease).    Subjective   No acute overnight events.  This morning is afebrile, initially maintained sinus rhythm but developed recurrent A-fib with RVR this morning, rates 130s to 140s, maintaining hemodynamic stability.  Reports mild fatigue while in A-fib and occasional palpitations.  Given amiodarone IV bolus 150 mg, daily maintenance dose increased to 400 mg twice daily for 5 days.  Due to recurrence of A-fib has been initiated on Eliquis.  Metoprolol increased to 25 mg twice daily.  ECGs reviewed, QTCs remain stable.  Spirometry volumes continue to improve, now maintaining adequate saturations on room air.  Weight is trending near baseline, has trivial amount of lower extremity edema, will continue diuresis 1 additional day.  Postoperative pain is well-controlled with use of Tylenol.  Tolerating diet, moving bowels daily.  Ambulating with minimal spot assistance.  Hopeful for discharge tomorrow with home health care if arrhythmia issues have resolved.       Objective     Physical Exam  Constitutional:       General: He is not in acute distress.     Appearance: Normal appearance.   HENT:      Head: Normocephalic.      Nose: Nose normal.      Mouth/Throat:      Mouth: Mucous membranes are moist.   Eyes:      Pupils: Pupils are equal, round, and reactive to light.   Cardiovascular:      Rate and Rhythm: Tachycardia present. Rhythm irregular.      Pulses: Normal pulses.      Heart sounds: Normal heart sounds.   Pulmonary:      Comments: Sternum stable, sternotomy well-approximated without erythema or drainage  No drainage from chest tube sites  Improving inspiratory effort, spirometry volumes over 1000 mL  Breath sounds diminished at bases otherwise clear to auscultation  Abdominal:      General: Bowel sounds are normal.      Comments: Moving bowels daily   Genitourinary:     Comments: Voiding clear yellow  Musculoskeletal:    "      General: Normal range of motion.      Cervical back: Normal range of motion.      Comments: 1+ left lower extremity edema   Skin:     General: Skin is warm and dry.      Capillary Refill: Capillary refill takes less than 2 seconds.      Comments: Left lower extremity EVH incisions well-approximated, mild ecchymosis no erythema   Neurological:      General: No focal deficit present.      Mental Status: He is alert and oriented to person, place, and time.   Psychiatric:         Mood and Affect: Mood normal.         Last Recorded Vitals  Blood pressure 143/78, pulse 68, temperature 36.2 °C (97.2 °F), resp. rate 18, height 1.676 m (5' 6\"), weight 81.2 kg (179 lb 0.2 oz), SpO2 92 %.  Intake/Output last 3 Shifts:  I/O last 3 completed shifts:  In: 410 (4.9 mL/kg) [P.O.:360; I.V.:50 (0.6 mL/kg)]  Out: 1480 (17.8 mL/kg) [Urine:1480 (0.5 mL/kg/hr)]  Weight: 83 kg     Relevant Results  Scheduled medications  amiodarone, 150 mg, intravenous, Once  amiodarone, 400 mg, oral, BID  [Held by provider] amLODIPine, 2.5 mg, oral, Daily  apixaban, 5 mg, oral, q12h  aspirin, 81 mg, oral, Daily  atorvastatin, 40 mg, oral, Daily  cholecalciferol, 2,000 Units, oral, Daily  docusate sodium, 100 mg, oral, TID  furosemide, 40 mg, intravenous, Daily  guaiFENesin, 1,200 mg, oral, BID  insulin lispro, 0-15 Units, subcutaneous, Before meals & nightly  ipratropium-albuteroL, 3 mL, nebulization, TID  iron polysaccharides, 150 mg, oral, Daily  lidocaine, 1 patch, transdermal, q24h  lidocaine, 1 patch, transdermal, Daily  magnesium oxide, 400 mg, oral, Daily  metoprolol tartrate, 25 mg, oral, BID  pantoprazole, 40 mg, oral, Daily before breakfast  polyethylene glycol, 17 g, oral, BID  potassium chloride CR, 20 mEq, oral, BID with meals      Continuous medications     PRN medications  PRN medications: acetaminophen, dextrose **OR** glucagon, ipratropium-albuteroL, magnesium sulfate, magnesium sulfate, naloxone, nicotine, oxyCODONE, oxygen, " potassium chloride CR, potassium chloride CR, traMADol         Results for orders placed or performed during the hospital encounter of 02/22/24 (from the past 24 hour(s))   POCT GLUCOSE   Result Value Ref Range    POCT Glucose 101 (H) 74 - 99 mg/dL   POCT GLUCOSE   Result Value Ref Range    POCT Glucose 102 (H) 74 - 99 mg/dL   POCT GLUCOSE   Result Value Ref Range    POCT Glucose 105 (H) 74 - 99 mg/dL   CBC   Result Value Ref Range    WBC 7.4 4.4 - 11.3 x10*3/uL    nRBC 0.0 0.0 - 0.0 /100 WBCs    RBC 3.06 (L) 4.50 - 5.90 x10*6/uL    Hemoglobin 9.8 (L) 13.5 - 17.5 g/dL    Hematocrit 29.2 (L) 41.0 - 52.0 %    MCV 95 80 - 100 fL    MCH 32.0 26.0 - 34.0 pg    MCHC 33.6 32.0 - 36.0 g/dL    RDW 12.9 11.5 - 14.5 %    Platelets 184 150 - 450 x10*3/uL   Magnesium   Result Value Ref Range    Magnesium 2.14 1.60 - 2.40 mg/dL   Renal Function Panel   Result Value Ref Range    Glucose 106 (H) 74 - 99 mg/dL    Sodium 137 136 - 145 mmol/L    Potassium 3.5 3.5 - 5.3 mmol/L    Chloride 102 98 - 107 mmol/L    Bicarbonate 26 21 - 32 mmol/L    Anion Gap 13 10 - 20 mmol/L    Urea Nitrogen 26 (H) 6 - 23 mg/dL    Creatinine 1.08 0.50 - 1.30 mg/dL    eGFR 70 >60 mL/min/1.73m*2    Calcium 9.1 8.6 - 10.3 mg/dL    Phosphorus 4.4 2.5 - 4.9 mg/dL    Albumin 3.4 3.4 - 5.0 g/dL   POCT GLUCOSE   Result Value Ref Range    POCT Glucose 106 (H) 74 - 99 mg/dL   ECG 12 Lead   Result Value Ref Range    Ventricular Rate 70 BPM    Atrial Rate 70 BPM    MS Interval 180 ms    QRS Duration 80 ms    QT Interval 420 ms    QTC Calculation(Bazett) 453 ms    P Axis 44 degrees    R Axis 47 degrees    T Axis 48 degrees    QRS Count 12 beats    Q Onset 229 ms    P Onset 139 ms    P Offset 192 ms    T Offset 439 ms    QTC Fredericia 442 ms   ECG 12 lead   Result Value Ref Range    Ventricular Rate 107 BPM    Atrial Rate 66 BPM    QRS Duration 84 ms    QT Interval 376 ms    QTC Calculation(Bazett) 501 ms    R Axis 70 degrees    T Axis 16 degrees    QRS Count 17 beats     Q Onset 220 ms    T Offset 408 ms    QTC Fredericia 456 ms    ECG 12 lead    Result Date: 2/27/2024  Atrial fibrillation with rapid ventricular response with premature ventricular or aberrantly conducted complexes Nonspecific T wave abnormality Abnormal ECG When compared with ECG of 27-FEB-2024 06:40, (unconfirmed) Atrial fibrillation has replaced Sinus rhythm Vent. rate has increased BY  37 BPM    XR chest 1 view    Result Date: 2/27/2024  Interpreted By:  Jerel Malagon, STUDY: XR CHEST 1 VIEW;  2/27/2024 5:40 am   INDICATION: Signs/Symptoms:post op cardiac surgery.   COMPARISON: Portable chest, 25 February 2024   ACCESSION NUMBER(S): [id number]   ORDERING CLINICIAN: MIL PRETTY   TECHNIQUE: Single frontal view of the chest; Portable technique   FINDINGS: Right central line has been removed   No other interval change   The cardiomediastinal silhouette is unchanged   Wedge-shaped focal small area of atelectasis in the periphery of the left midlung where chest tube previously resided, not an unexpected radiographic finding   No acute airspace disease   No large pleural effusion or demonstrable pneumothorax       No acute unexpected radiographic findings after cardiac surgery and more recent removal of the right IJ central line   MACRO: None   Signed by: Jerel Malagon 2/27/2024 7:57 AM Dictation workstation:   [id number]    ECG 12 Lead    Result Date: 2/27/2024  Normal sinus rhythm Nonspecific ST and T wave abnormality Abnormal ECG When compared with ECG of 26-FEB-2024 06:28, (unconfirmed) Premature ventricular complexes are no longer Present    ECG 12 lead    Result Date: 2/26/2024  Sinus rhythm with occasional Premature ventricular complexes Nonspecific ST abnormality Abnormal ECG When compared with ECG of 25-FEB-2024 17:53, (unconfirmed) Premature ventricular complexes are now Present    ECG 12 lead    Result Date: 2/25/2024  Normal sinus rhythm Normal ECG When compared with ECG of 25-FEB-2024 17:52,  (unconfirmed) No significant change was found                   Assessment/Plan   Principal Problem:    CAD (coronary artery disease)  Active Problems:    S/P coronary artery bypass graft x 3    CAD  CABGx3 with LIMA-LAD, SVG-PL, SVG-Diag; Endoscopic vein harvest; intraoperative LAUREN.  -Discussed with Dr. Chung  -epicardial wires clipped at skin 2/26  -ASA and Statin daily  -continue diuresis; Lasix 40mg IVP x1  -Keep K>4 and Mag>2  -PRN Tylenol, tramadol added as alternative to oxycodone  -Bowel regimen of Miralax and Colace  -cardiac diet; SSI TID-AC and HS; Goal BG<180  -Protonix for GI prophylaxis   -SCD's and ambulation for DVT prophylaxis; subcutaneous heparin on hold secondary to thrombocytopenia, now mobile and will not require pharmacological prophylaxis  -Aggressive pulmonary hygiene; wean supplemental oxygen as able for SpO2>90%  -Increase activity as tolerated; therapy following   -CBC, BMP, Mag, CXR, and EKG in AM     Pericarditis  Acute post operative pericarditis as evidenced by signifant pericardial rub and mild diffuse ST elevation on EKG. Initiated on Colchicine.  -Colchicine discontinued as rub and ST elevation have resolved.      Atrial Fibrillation  POD#2: Pt went into AFIB RVR with rates in 120s. Remained HDS with /84. Asymptomatic. Given Amiodarone bolus and started on drip per protocol. Pt converted back to sinus rhythm soon after Amio started. Episode lasted roughly an hour.   POD #5: Developed recurrent A-fib with -140s  -Metoprolol increased to 25 mg daily  -Amiodarone 150 mg IV bolus x 1, maintenance dosing increased to 400 mg twice daily x 5 days, then reduce to 200mg daily  -Initiated Eliquis 5 mg twice daily     HTN; HLD  Lipid panel: Chol 164, HDL 36.9, LDL 89,   -Statin therapy  -Metoprolol 12.5mg BID  -optimize medical management as hemodynamics allow  -Cardiology consulted; appreciate assistance      Anemia due to blood loss; Acquired thrombocytopenia  Acute post  operative anemia   Acquired thrombocytopenia 2/2 consumption and dilution; Given platelets x1 intraoperatively.   POD#1: Hgb 10.1 and Platelets 114k  POD#2: Hgb 8.8 and Platelets 83k. Suspect dilutional component as no evidence of active bleeding noted and LOS FB +6L  POD#3: Hgb 8.5 and Platelets 83k. No signs of active bleeding. FB + 5.6L   POD #4: Hemoglobin 8.9, platelets 129, no signs active bleeding  POD #5: Hemoglobin 9.8, platelets 184,000, no signs of active bleeding  -monitor for signs of active bleeding  -continue diuresis  -Ok for daily ASA, Eliquis  -CBC in AM          I spent 45 minutes in the professional and overall care of this patient.      NATE Dalton-CNP

## 2024-02-27 NOTE — NURSING NOTE
Around 0840 patient went into atrial fibrillation with 's-140's. Stat EKG obtained which confirmed atrial fibrillation with RVR. Secure chat sent to Rashad Storey and Brooks Ho. New orders were placed.

## 2024-02-27 NOTE — CARE PLAN
Problem: Knowledge Deficit  Goal: Patient/family/caregiver demonstrates understanding of disease process, treatment plan, medications, and discharge instructions  Outcome: Progressing     Problem: Skin  Goal: Decreased wound size/increased tissue granulation at next dressing change  Outcome: Progressing  Goal: Participates in plan/prevention/treatment measures  Outcome: Progressing  Goal: Prevent/manage excess moisture  Outcome: Progressing  Goal: Prevent/minimize sheer/friction injuries  Outcome: Progressing  Goal: Promote skin healing  Outcome: Progressing     Problem: Pain  Goal: Takes deep breaths with improved pain control throughout the shift  Outcome: Progressing  Goal: Turns in bed with improved pain control throughout the shift  Outcome: Progressing  Goal: Walks with improved pain control throughout the shift  Outcome: Progressing  Goal: Performs ADL's with improved pain control throughout shift  Outcome: Progressing  Goal: Participates in PT with improved pain control throughout the shift  Outcome: Progressing  Goal: Free from opioid side effects throughout the shift  Outcome: Progressing     Problem: Fall/Injury  Goal: Not fall by end of shift  Outcome: Progressing  Goal: Be free from injury by end of the shift  Outcome: Progressing  Goal: Verbalize understanding of personal risk factors for fall in the hospital  Outcome: Progressing  Goal: Verbalize understanding of risk factor reduction measures to prevent injury from fall in the home  Outcome: Progressing  Goal: Use assistive devices by end of the shift  Outcome: Progressing  Goal: Pace activities to prevent fatigue by end of the shift  Outcome: Progressing     Problem: Pain - Adult  Goal: Verbalizes/displays adequate comfort level or baseline comfort level  Outcome: Progressing     Problem: Safety - Adult  Goal: Free from fall injury  Outcome: Progressing     Problem: Discharge Planning  Goal: Discharge to home or other facility with appropriate  resources  Outcome: Progressing     Problem: Chronic Conditions and Co-morbidities  Goal: Patient's chronic conditions and co-morbidity symptoms are monitored and maintained or improved  Outcome: Progressing

## 2024-02-27 NOTE — PROGRESS NOTES
Physical Therapy    Physical Therapy Treatment    Patient Name: Sylvester Carvalho  MRN: [id number]  Today's Date: 2/27/2024  Time Calculation  Start Time: 1313  Stop Time: 1335  Time Calculation (min): 22 min       Assessment/Plan   End of Session Communication: Bedside nurse  Assessment Comment: Patient presents with good effort throughout todays session. Demonstrates safety awareness throughout and receptive to cues. Maintains MITT precautions. Call light and all needs in reach.  End of Session Patient Position: Up in chair, Alarm off, caregiver present      General Visit Information:   PT  Visit  PT Received On: 02/27/24  General  Prior to Session Communication: Bedside nurse  Patient Position Received: Up in chair, Alarm off, caregiver present  General Comment: Pt agreeable to therapy this date.  Subjective     Precautions:  Precautions  Medical Precautions: Fall precautions, Cardiac precautions (MITT)  Post-Surgical Precautions: Move in the Tube       Objective     Pain:  Pain Assessment  Pain Assessment: 0-10  Pain Score: 0 - No pain         Treatments:  Therapeutic Exercise  Therapeutic Exercise Performed: Yes  Therapeutic Exercise Activity 1: seated, BLE LAQ, marches 15x VC for slow performance           Ambulation/Gait Training  Ambulation/Gait Training Performed: Yes  Ambulation/Gait Training 1  Surface 1: Level tile  Device 1: Rolling walker (FWW)  Assistance 1: Close supervision  Comments/Distance (ft) 1: Pt ambulated 200' with FWW, SUP. Pt demonstrates normalized gait pattern with two standing rest breaks due to fatigue. Pt ambulates slowly with ability to navigate around objects. Good AD management with directional changes.  Transfers  Transfer: Yes  Transfers 2  Transfer From 2: Chair with arms to  Transfer to 2: Stand  Technique 2: Stand to sit, Sit to stand  Transfer Device 2: Walker (FWW)  Transfer Level of Assistance 2: Close supervision  Trials/Comments 2: Pt performed STS 5x from recliner<>FWW SUP to  improve LE strength and endurance. No VC required for sequencing, maintained MITT precautions throughout.  Stairs  Stairs: Yes  Stairs  Rails 1: Bilateral  Curb Step 1: No  Device 1: No device  Assistance 1: Close supervision  Comment/Number of Steps 1: Pt ascended/descended three stairs 4'' and two stairs 6'' SUP in a reciprocal pattern with use of BHR for UE support. Pt performed slow and controlled without incident. PLB encouraged throughout.       Outcome Measures:  [facility] Basic Mobility  Turning from your back to your side while in a flat bed without using bedrails: A little  Moving from lying on your back to sitting on the side of a flat bed without using bedrails: A little  Moving to and from bed to chair (including a wheelchair): A little  Standing up from a chair using your arms (e.g. wheelchair or bedside chair): A little  To walk in hospital room: A little  Climbing 3-5 steps with railing: A little  Basic Mobility - Total Score: 18  Education Documentation  Precautions, taught by Haley Ndiaye PTA at 2/27/2024  2:12 PM.  Learner: Patient  Readiness: Acceptance  Method: Explanation, Demonstration  Response: Verbalizes Understanding, Needs Reinforcement    Mobility Training, taught by Haley Ndiaye PTA at 2/27/2024  2:12 PM.  Learner: Patient  Readiness: Acceptance  Method: Explanation, Demonstration  Response: Verbalizes Understanding, Needs Reinforcement    Education Comments  No comments found.        EDUCATION:  Outpatient Education  Individual(s) Educated: Patient  Education Provided: Body Mechanics, Home Exercise Program, Post-Op Precautions  Education Comment: Pt educated in gait, transfers, ther ex, stair training and precautions.  Encounter Problems       Encounter Problems (Active)       PT Problem       Pt will demonstrate mod I  with bed mobility to edge of bed.   (Progressing)       Start:  02/23/24    Expected End:  02/28/24            Pt will demonstrate mod I  with sit to stand/chair  transfers with no A/D    (Progressing)       Start:  02/23/24    Expected End:  02/28/24            Pt will ambulate 200 feet with mod I  no A/D .   (Progressing)       Start:  02/23/24    Expected End:  02/28/24            Pt to demo 14 steps with  rails with SBA  (Progressing)       Start:  02/23/24    Expected End:  02/28/24               Pain - Adult

## 2024-02-27 NOTE — PROGRESS NOTES
[facility] Heart Progress Note           Rounding THUY/Cardiologist:  Arnoldo Lin DO, Dr. Arnoldo Lin  Primary Cardiologist: Dr. Pedro Zelaya    Date:  2/27/2024  Patient:  ySlvester Carvalho  YOB: 1945  MRN:  [id number]   Admit Date:  2/22/2024      SUBJECTIVE:    2/27/24  Patient sitting up in chair eating breakfast at this time he states he feels pretty good occasionally he feels some palpitations in his chest.  He is well-informed of plan of care  Telemetry is A-fib rate 104  EKG is atrial fibrillation    2/26/24  Sylvester Carvalho is a 78 y.o.  male patient who is being at the request of Dr. Chung for inpatient consultation of  s/p CABG . He was admitted on 2/22/2024.  Previous [facility] and [facility] records have been reviewed in detail.    Patient with a history of CAD, hypertension, dyslipidemia, EF 50%.  Patient came in on 2/22/2024 had triple-vessel CABG done on.  LIMA to the LAD  Saphenous vein graft to the PL  Saphenous vein graft to the diagonal  Recovered well in the ICU he is currently on telemetry he denies having any chest pain he is very thankful for having the procedure.  He denies chest pain, shortness of breath, nausea, vomiting, PND, orthopnea, claudications  Telemetry is normal sinus rhythm occ PVC  EKG is normal sinus rhythm with occasional PVC no acute changes     Cardiac history  2/1/24  1. Distal Left Main: 10-30% stenosis.   2. Proximal LAD Lesion: The percent stenosis is 80%.   3. Mid LAD Lesion: The percent stenosis is 40%.   4. Entire CX Lesion: The percent stenosis is <10%.   5. Distal and proximal RCA Lesion: The percent stenosis is 10-30%.   6. Prox PLVB RCA Lesion: The percent stenosis is 90 %.   7. The Left Ventricular Ejection Fraction is 60%.        CONCLUSIONS:   1. Left ventricular systolic function is normal with a 60-65% estimated ejection fraction.   2. Spectral Doppler shows an impaired relaxation pattern of left ventricular diastolic filling.   3.  Mild tricuspid regurgitation is visualized.     QUANTITATIVE DATA SUMMARY:         VITALS:     Vitals:    02/26/24 2329 02/27/24 0449 02/27/24 0732 02/27/24 0939   BP: 115/63 143/78     BP Location:       Patient Position:       Pulse: 68      Resp:       Temp: 36.4 °C (97.5 °F) 36.2 °C (97.2 °F)     TempSrc:       SpO2: 92% 93% 92%    Weight:    81.2 kg (179 lb 0.2 oz)   Height:           Intake/Output Summary (Last 24 hours) at 2/27/2024 1035  Last data filed at 2/26/2024 2124  Gross per 24 hour   Intake 410 ml   Output 580 ml   Net -170 ml       Wt Readings from Last 4 Encounters:   02/27/24 81.2 kg (179 lb 0.2 oz)   02/08/24 82.6 kg (182 lb)   02/02/24 83 kg (182 lb 15.7 oz)   01/31/24 82.7 kg (182 lb 6.4 oz)       CURRENT HOSPITAL MEDICATIONS:   amiodarone, 150 mg, intravenous, Once  amiodarone, 400 mg, oral, BID  [Held by provider] amLODIPine, 2.5 mg, oral, Daily  apixaban, 5 mg, oral, q12h  aspirin, 81 mg, oral, Daily  atorvastatin, 40 mg, oral, Daily  cholecalciferol, 2,000 Units, oral, Daily  docusate sodium, 100 mg, oral, TID  furosemide, 40 mg, intravenous, Daily  guaiFENesin, 1,200 mg, oral, BID  insulin lispro, 0-15 Units, subcutaneous, Before meals & nightly  ipratropium-albuteroL, 3 mL, nebulization, TID  iron polysaccharides, 150 mg, oral, Daily  lidocaine, 1 patch, transdermal, q24h  lidocaine, 1 patch, transdermal, Daily  magnesium oxide, 400 mg, oral, Daily  metoprolol tartrate, 25 mg, oral, BID  pantoprazole, 40 mg, oral, Daily before breakfast  polyethylene glycol, 17 g, oral, BID  potassium chloride CR, 20 mEq, oral, BID with meals         Current Outpatient Medications   Medication Instructions    amLODIPine (NORVASC) 2.5 mg, oral, Daily    aspirin 81 mg, oral, Daily    atorvastatin (LIPITOR) 40 mg, oral, Daily    benazepriL-hydrochlorothiazide (Lotensin HCT) 20-25 mg tablet 1 tablet, oral, Daily    cholecalciferol (Vitamin D-3) 50 MCG (2000 UT) tablet 1 tablet, oral, Daily    mupirocin  (Bactroban) 2 % ointment Topical, 2 times daily, TWICE ADAY  SINCE SUNDAY 2/18/24    mv-min/FA/vit K/lutein/zeaxant (PRESERVISION AREDS 2 PLUS MV ORAL) oral    nitroglycerin (NITROSTAT) 0.4 mg, sublingual, Every 5 min PRN, May repeat every 5 minutes for up to 3 doses.    pantoprazole (PROTONIX) 40 mg, oral, Daily before breakfast, Do not crush, chew, or split.        PHYSICAL EXAMINATION:   GENERAL:  Well developed, well nourished, in no acute distress.  CHEST:  Symmetric and nontender.  NEURO/PSYCH:  Alert and oriented times three with approppriate behavior and responses.  NECK:  Supple, no JVD, no bruit.  LUNGS:  Clear to auscultation bilaterally, normal respiratory effort.  HEART: s1, s2 irregular        There are no rubs, clicks or heaves.  EXTREMITIES:  Warm with good color, no clubbing or cyanosis.  There is no edema noted.  PERIPHERAL VASCULAR:  Pulses present and equally palpable; 2+ throughout.      LAB DATA:     CBC:   Results from last 7 days   Lab Units 02/27/24  0450 02/26/24  0420 02/25/24  0333   WBC AUTO x10*3/uL 7.4 6.7 7.5   RBC AUTO x10*6/uL 3.06* 2.82* 2.62*   HEMOGLOBIN g/dL 9.8* 8.9* 8.5*   HEMATOCRIT % 29.2* 26.9* 25.1*   MCV fL 95 95 96   MCH pg 32.0 31.6 32.4   MCHC g/dL 33.6 33.1 33.9   RDW % 12.9 13.0 12.8   PLATELETS AUTO x10*3/uL 184 129* 83*     CMP:    Results from last 7 days   Lab Units 02/27/24  0450 02/26/24  0420 02/25/24  0333   SODIUM mmol/L 137 139 138   POTASSIUM mmol/L 3.5 3.9 3.5   CHLORIDE mmol/L 102 105 106   CO2 mmol/L 26 28 23   BUN mg/dL 26* 21 20   CREATININE mg/dL 1.08 0.92 0.76   GLUCOSE mg/dL 106* 100* 209*   CALCIUM mg/dL 9.1 8.7 7.2*     BMP:    Results from last 7 days   Lab Units 02/27/24  0450 02/26/24  0420 02/25/24  0333   SODIUM mmol/L 137 139 138   POTASSIUM mmol/L 3.5 3.9 3.5   CHLORIDE mmol/L 102 105 106   CO2 mmol/L 26 28 23   BUN mg/dL 26* 21 20   CREATININE mg/dL 1.08 0.92 0.76   CALCIUM mg/dL 9.1 8.7 7.2*   GLUCOSE mg/dL 106* 100* 209*      Magnesium:  Results from last 7 days   Lab Units 02/27/24  0450 02/26/24  0420 02/25/24  0333   MAGNESIUM mg/dL 2.14 2.20 1.89     Troponin:      BNP:     Lipid Panel:         DIAGNOSTIC TESTING:   @No results found for this or any previous visit.    ECG 12 lead    Result Date: 2/27/2024  Atrial fibrillation with rapid ventricular response with premature ventricular or aberrantly conducted complexes Nonspecific T wave abnormality Abnormal ECG When compared with ECG of 27-FEB-2024 06:40, (unconfirmed) Atrial fibrillation has replaced Sinus rhythm Vent. rate has increased BY  37 BPM    XR chest 1 view    Result Date: 2/27/2024  Interpreted By:  Jerel Malagon, STUDY: XR CHEST 1 VIEW;  2/27/2024 5:40 am   INDICATION: Signs/Symptoms:post op cardiac surgery.   COMPARISON: Portable chest, 25 February 2024   ACCESSION NUMBER(S): [id number]   ORDERING CLINICIAN: MIL PRETTY   TECHNIQUE: Single frontal view of the chest; Portable technique   FINDINGS: Right central line has been removed   No other interval change   The cardiomediastinal silhouette is unchanged   Wedge-shaped focal small area of atelectasis in the periphery of the left midlung where chest tube previously resided, not an unexpected radiographic finding   No acute airspace disease   No large pleural effusion or demonstrable pneumothorax       No acute unexpected radiographic findings after cardiac surgery and more recent removal of the right IJ central line   MACRO: None   Signed by: Jerel Malagon 2/27/2024 7:57 AM Dictation workstation:   [id number]    ECG 12 Lead    Result Date: 2/27/2024  Normal sinus rhythm Nonspecific ST and T wave abnormality Abnormal ECG When compared with ECG of 26-FEB-2024 06:28, (unconfirmed) Premature ventricular complexes are no longer Present       XR chest 1 view   Final Result   No acute unexpected radiographic findings after cardiac surgery and   more recent removal of the right IJ central line        MACRO:   None         Signed by: Jerel Malagon 2/27/2024 7:57 AM   Dictation workstation:   [id number]      XR chest 1 view   Final Result   Pleural and parenchymal disease. Enlarged cardiac silhouette.        MACRO:   none        Signed by: Марина Juan 2/25/2024 8:34 AM   Dictation workstation:   [id number]      XR chest 1 view   Final Result   1.  Bibasilar irregular atelectasis and/or small infiltrates more   prominent from prior.                  MACRO:   None.        Signed by: Oscar Loredo 2/24/2024 12:52 PM   Dictation workstation:   [id number]      XR chest 1 view   Final Result   No evidence of pneumothorax status post chest tube removal.        MACRO:   None.        Signed by: Sohan Knight 2/23/2024 7:40 PM   Dictation workstation:   [id number]      XR chest 1 view   Final Result   Removal of NG and endotracheal tubes. No significant change otherwise   with trace bilateral pleural effusions and left lower lung airspace   opacities. No pneumothorax.        MACRO   None        Signed by: Lilian Ray 2/23/2024 10:59 AM   Dictation workstation:   [id number]      XR chest 1 view   Final Result   The tubes and lines are well positioned.        Mild cardiomegaly and central vascular prominence.        Small component of bibasilar atelectasis.        Continued clinical and radiographic follow-up is recommended.        Signed by: Arnoldo Queen 2/22/2024 1:43 PM   Dictation workstation:   [id number]      XR chest 1 view   Final Result   1.  No active cardiopulmonary disease.        Signed by: Frederic Patel 2/22/2024 10:08 AM   Dictation workstation:   [id number]      Anesthesia Intraoperative Transesophageal Echocardiogram    (Results Pending)       No echocardiogram results found for the past 14 days    RADIOLOGY:     XR chest 1 view   Final Result   No acute unexpected radiographic findings after cardiac surgery and   more recent removal of the right IJ central line        MACRO:   None        Signed by: Jerel  Manas 2/27/2024 7:57 AM   Dictation workstation:   [id number]      XR chest 1 view   Final Result   Pleural and parenchymal disease. Enlarged cardiac silhouette.        MACRO:   none        Signed by: Марина Juan 2/25/2024 8:34 AM   Dictation workstation:   [id number]      XR chest 1 view   Final Result   1.  Bibasilar irregular atelectasis and/or small infiltrates more   prominent from prior.                  MACRO:   None.        Signed by: Oscar Loredo 2/24/2024 12:52 PM   Dictation workstation:   [id number]      XR chest 1 view   Final Result   No evidence of pneumothorax status post chest tube removal.        MACRO:   None.        Signed by: Sohan Knight 2/23/2024 7:40 PM   Dictation workstation:   [id number]      XR chest 1 view   Final Result   Removal of NG and endotracheal tubes. No significant change otherwise   with trace bilateral pleural effusions and left lower lung airspace   opacities. No pneumothorax.        MACRO   None        Signed by: Lilian Ray 2/23/2024 10:59 AM   Dictation workstation:   [id number]      XR chest 1 view   Final Result   The tubes and lines are well positioned.        Mild cardiomegaly and central vascular prominence.        Small component of bibasilar atelectasis.        Continued clinical and radiographic follow-up is recommended.        Signed by: Arnoldo Queen 2/22/2024 1:43 PM   Dictation workstation:   [id number]      XR chest 1 view   Final Result   1.  No active cardiopulmonary disease.        Signed by: Frederic Patle 2/22/2024 10:08 AM   Dictation workstation:   [id number]      Anesthesia Intraoperative Transesophageal Echocardiogram    (Results Pending)       PROBLEM LIST     Patient Active Problem List   Diagnosis    History of squamous cell carcinoma    Allergic rhinitis    Atypical nevus    Benign hypertension    Cataract, nuclear sclerotic, both eyes    Diverticulosis of colon    Enlarged prostate with lower urinary tract symptoms (LUTS)     Eustachian tube dysfunction    Exudative age-related macular degeneration of both eyes with active choroidal neovascularization (CMS/HCC)    GERD (gastroesophageal reflux disease)    HLD (hyperlipidemia)    Hordeolum externum (stye)    Hyperbilirubinemia    Inflamed skin tag    Internal hordeolum of left eye    Macular degeneration    Both eyes affected by degenerative myopia with choroidal neovascularization    Osteoarthrosis    BMI 28.0-28.9,adult    Vitamin D deficiency    Pencilling of stools    Encounter for screening for malignant neoplasm of colon    Cyst of bone of right hand    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     Bedside examination evaluation performed by me     Chart review detail discussed the patient and his family and the staff.     Impression:  CAD status post CABG postop day 5  Hypertension  Dyslipidemia  EF 50%  Paroxysmal atrial fibrillation          PLAN:   Recommendation:    Aspirin 81 daily  Lopressor 25 mg p.o. twice daily  Mag oxide mg 400 daily  Lipitor 40 mg daily  Amiodarone with adjusted dose  Daily EKGs  Lopressor 2.5 mg IV push x 1 now  Eliquis 5 mg p.o. twice daily for oral anticoagulation  Will discuss case with CT surgery  Probable discharge tomorrow    Arnoldo Lin DO,FACC      Brooks Ho CNP  [facility]      Of note, this documentation is completed using the Dragon Dictation system (voice recognition software). There may be spelling and/or grammatical errors that were not corrected prior to final submission.    Please do not hesitate to call with questions.  Electronically  signed by Arnoldo Lin DO, on 2/27/2024 at 10:36 AM

## 2024-02-28 ENCOUNTER — DOCUMENTATION (OUTPATIENT)
Dept: HOME HEALTH SERVICES | Facility: HOME HEALTH | Age: 79
End: 2024-02-28
Payer: MEDICARE

## 2024-02-28 ENCOUNTER — HOME HEALTH ADMISSION (OUTPATIENT)
Dept: HOME HEALTH SERVICES | Facility: HOME HEALTH | Age: 79
End: 2024-02-28
Payer: MEDICARE

## 2024-02-28 ENCOUNTER — APPOINTMENT (OUTPATIENT)
Dept: CARDIOLOGY | Facility: HOSPITAL | Age: 79
DRG: 236 | End: 2024-02-28
Payer: MEDICARE

## 2024-02-28 VITALS
SYSTOLIC BLOOD PRESSURE: 120 MMHG | WEIGHT: 179.01 LBS | RESPIRATION RATE: 18 BRPM | BODY MASS INDEX: 28.77 KG/M2 | HEART RATE: 64 BPM | OXYGEN SATURATION: 94 % | TEMPERATURE: 96.8 F | HEIGHT: 66 IN | DIASTOLIC BLOOD PRESSURE: 73 MMHG

## 2024-02-28 VITALS
DIASTOLIC BLOOD PRESSURE: 67 MMHG | TEMPERATURE: 98.2 F | SYSTOLIC BLOOD PRESSURE: 125 MMHG | OXYGEN SATURATION: 98 % | HEART RATE: 78 BPM

## 2024-02-28 LAB
ANION GAP SERPL CALC-SCNC: 14 MMOL/L (ref 10–20)
ATRIAL RATE: 241 BPM
BLOOD EXPIRATION DATE: NORMAL
BLOOD EXPIRATION DATE: NORMAL
BUN SERPL-MCNC: 27 MG/DL (ref 6–23)
CALCIUM SERPL-MCNC: 8.9 MG/DL (ref 8.6–10.3)
CHLORIDE SERPL-SCNC: 105 MMOL/L (ref 98–107)
CO2 SERPL-SCNC: 23 MMOL/L (ref 21–32)
CREAT SERPL-MCNC: 0.98 MG/DL (ref 0.5–1.3)
DISPENSE STATUS: NORMAL
DISPENSE STATUS: NORMAL
EGFRCR SERPLBLD CKD-EPI 2021: 78 ML/MIN/1.73M*2
ERYTHROCYTE [DISTWIDTH] IN BLOOD BY AUTOMATED COUNT: 12.8 % (ref 11.5–14.5)
GLUCOSE BLD MANUAL STRIP-MCNC: 110 MG/DL (ref 74–99)
GLUCOSE BLD MANUAL STRIP-MCNC: 98 MG/DL (ref 74–99)
GLUCOSE SERPL-MCNC: 110 MG/DL (ref 74–99)
HCT VFR BLD AUTO: 27.5 % (ref 41–52)
HGB BLD-MCNC: 9.4 G/DL (ref 13.5–17.5)
MAGNESIUM SERPL-MCNC: 2.08 MG/DL (ref 1.6–2.4)
MCH RBC QN AUTO: 32.1 PG (ref 26–34)
MCHC RBC AUTO-ENTMCNC: 34.2 G/DL (ref 32–36)
MCV RBC AUTO: 94 FL (ref 80–100)
NRBC BLD-RTO: 0 /100 WBCS (ref 0–0)
PLATELET # BLD AUTO: 186 X10*3/UL (ref 150–450)
POTASSIUM SERPL-SCNC: 4.1 MMOL/L (ref 3.5–5.3)
PRODUCT BLOOD TYPE: 9500
PRODUCT BLOOD TYPE: 9500
PRODUCT CODE: NORMAL
PRODUCT CODE: NORMAL
Q ONSET: 228 MS
QRS COUNT: 14 BEATS
QRS DURATION: 82 MS
QT INTERVAL: 386 MS
QTC CALCULATION(BAZETT): 469 MS
QTC FREDERICIA: 440 MS
R AXIS: 81 DEGREES
RBC # BLD AUTO: 2.93 X10*6/UL (ref 4.5–5.9)
SODIUM SERPL-SCNC: 138 MMOL/L (ref 136–145)
T AXIS: 67 DEGREES
T OFFSET: 421 MS
UNIT ABO: NORMAL
UNIT ABO: NORMAL
UNIT NUMBER: NORMAL
UNIT NUMBER: NORMAL
UNIT RH: NORMAL
UNIT RH: NORMAL
UNIT VOLUME: 350
UNIT VOLUME: 350
VENTRICULAR RATE: 89 BPM
WBC # BLD AUTO: 7.1 X10*3/UL (ref 4.4–11.3)
XM INTEP: NORMAL
XM INTEP: NORMAL

## 2024-02-28 PROCEDURE — 93010 ELECTROCARDIOGRAM REPORT: CPT | Performed by: INTERNAL MEDICINE

## 2024-02-28 PROCEDURE — 83735 ASSAY OF MAGNESIUM: CPT | Performed by: NURSE PRACTITIONER

## 2024-02-28 PROCEDURE — 99231 SBSQ HOSP IP/OBS SF/LOW 25: CPT | Performed by: NURSE PRACTITIONER

## 2024-02-28 PROCEDURE — 2500000001 HC RX 250 WO HCPCS SELF ADMINISTERED DRUGS (ALT 637 FOR MEDICARE OP): Performed by: NURSE PRACTITIONER

## 2024-02-28 PROCEDURE — 94640 AIRWAY INHALATION TREATMENT: CPT

## 2024-02-28 PROCEDURE — 82947 ASSAY GLUCOSE BLOOD QUANT: CPT

## 2024-02-28 PROCEDURE — 2500000002 HC RX 250 W HCPCS SELF ADMINISTERED DRUGS (ALT 637 FOR MEDICARE OP, ALT 636 FOR OP/ED): Mod: MUE | Performed by: NURSE PRACTITIONER

## 2024-02-28 PROCEDURE — 2500000004 HC RX 250 GENERAL PHARMACY W/ HCPCS (ALT 636 FOR OP/ED): Performed by: NURSE PRACTITIONER

## 2024-02-28 PROCEDURE — 2500000002 HC RX 250 W HCPCS SELF ADMINISTERED DRUGS (ALT 637 FOR MEDICARE OP, ALT 636 FOR OP/ED): Performed by: NURSE PRACTITIONER

## 2024-02-28 PROCEDURE — 97530 THERAPEUTIC ACTIVITIES: CPT | Mod: GP,CQ

## 2024-02-28 PROCEDURE — 80048 BASIC METABOLIC PNL TOTAL CA: CPT | Performed by: NURSE PRACTITIONER

## 2024-02-28 PROCEDURE — 99233 SBSQ HOSP IP/OBS HIGH 50: CPT | Performed by: INTERNAL MEDICINE

## 2024-02-28 PROCEDURE — 93005 ELECTROCARDIOGRAM TRACING: CPT

## 2024-02-28 PROCEDURE — 85027 COMPLETE CBC AUTOMATED: CPT | Performed by: NURSE PRACTITIONER

## 2024-02-28 PROCEDURE — 2500000002 HC RX 250 W HCPCS SELF ADMINISTERED DRUGS (ALT 637 FOR MEDICARE OP, ALT 636 FOR OP/ED): Performed by: THORACIC SURGERY (CARDIOTHORACIC VASCULAR SURGERY)

## 2024-02-28 PROCEDURE — 36415 COLL VENOUS BLD VENIPUNCTURE: CPT | Performed by: NURSE PRACTITIONER

## 2024-02-28 RX ORDER — TRAMADOL HYDROCHLORIDE 50 MG/1
50 TABLET ORAL EVERY 6 HOURS PRN
Qty: 10 TABLET | Refills: 0 | Status: SHIPPED | OUTPATIENT
Start: 2024-02-28 | End: 2024-03-04 | Stop reason: WASHOUT

## 2024-02-28 RX ORDER — ATORVASTATIN CALCIUM 40 MG/1
40 TABLET, FILM COATED ORAL DAILY
Qty: 90 TABLET | Refills: 3 | Status: SHIPPED | OUTPATIENT
Start: 2024-02-28 | End: 2024-03-13 | Stop reason: SDUPTHER

## 2024-02-28 RX ORDER — IRON POLYSACCHARIDE COMPLEX 150 MG
150 CAPSULE ORAL DAILY
Qty: 30 CAPSULE | Refills: 0 | Status: SHIPPED | OUTPATIENT
Start: 2024-02-29 | End: 2024-04-25 | Stop reason: ALTCHOICE

## 2024-02-28 RX ORDER — LANOLIN ALCOHOL/MO/W.PET/CERES
400 CREAM (GRAM) TOPICAL DAILY
Qty: 30 TABLET | Refills: 0 | Status: SHIPPED | OUTPATIENT
Start: 2024-02-29 | End: 2024-04-25 | Stop reason: ALTCHOICE

## 2024-02-28 RX ORDER — METOPROLOL TARTRATE 25 MG/1
25 TABLET, FILM COATED ORAL 2 TIMES DAILY
Qty: 60 TABLET | Refills: 0 | Status: SHIPPED | OUTPATIENT
Start: 2024-02-28 | End: 2024-03-08 | Stop reason: SDUPTHER

## 2024-02-28 RX ORDER — POTASSIUM CHLORIDE 20 MEQ/1
20 TABLET, EXTENDED RELEASE ORAL
Qty: 10 TABLET | Refills: 0 | Status: SHIPPED | OUTPATIENT
Start: 2024-02-29 | End: 2024-03-04 | Stop reason: WASHOUT

## 2024-02-28 RX ORDER — AMIODARONE HYDROCHLORIDE 200 MG/1
200 TABLET ORAL 2 TIMES DAILY
Qty: 56 TABLET | Refills: 0 | Status: SHIPPED | OUTPATIENT
Start: 2024-02-28 | End: 2024-03-08 | Stop reason: SDUPTHER

## 2024-02-28 RX ORDER — FUROSEMIDE 40 MG/1
40 TABLET ORAL EVERY MORNING
Qty: 5 TABLET | Refills: 0 | Status: SHIPPED | OUTPATIENT
Start: 2024-02-28 | End: 2024-03-04 | Stop reason: WASHOUT

## 2024-02-28 RX ORDER — FUROSEMIDE 40 MG/1
40 TABLET ORAL DAILY
Status: DISCONTINUED | OUTPATIENT
Start: 2024-02-29 | End: 2024-02-28 | Stop reason: HOSPADM

## 2024-02-28 RX ADMIN — IPRATROPIUM BROMIDE AND ALBUTEROL SULFATE 3 ML: 2.5; .5 SOLUTION RESPIRATORY (INHALATION) at 08:10

## 2024-02-28 RX ADMIN — PANTOPRAZOLE SODIUM 40 MG: 40 TABLET, DELAYED RELEASE ORAL at 09:35

## 2024-02-28 RX ADMIN — GUAIFENESIN 1200 MG: 600 TABLET, EXTENDED RELEASE ORAL at 09:38

## 2024-02-28 RX ADMIN — AMIODARONE HYDROCHLORIDE 400 MG: 200 TABLET ORAL at 09:37

## 2024-02-28 RX ADMIN — Medication 2000 UNITS: at 09:37

## 2024-02-28 RX ADMIN — ASPIRIN 81 MG: 81 TABLET, CHEWABLE ORAL at 09:37

## 2024-02-28 RX ADMIN — POLYSACCHARIDE-IRON COMPLEX 150 MG: 150 CAPSULE ORAL at 09:38

## 2024-02-28 RX ADMIN — APIXABAN 5 MG: 5 TABLET, FILM COATED ORAL at 09:37

## 2024-02-28 RX ADMIN — METOPROLOL TARTRATE 25 MG: 25 TABLET, FILM COATED ORAL at 09:38

## 2024-02-28 RX ADMIN — POTASSIUM CHLORIDE 20 MEQ: 1500 TABLET, EXTENDED RELEASE ORAL at 09:36

## 2024-02-28 RX ADMIN — MAGNESIUM OXIDE 400 MG (241.3 MG MAGNESIUM) TABLET 400 MG: TABLET at 09:38

## 2024-02-28 RX ADMIN — FUROSEMIDE 40 MG: 10 INJECTION, SOLUTION INTRAMUSCULAR; INTRAVENOUS at 09:38

## 2024-02-28 ASSESSMENT — COGNITIVE AND FUNCTIONAL STATUS - GENERAL
MOVING TO AND FROM BED TO CHAIR: A LITTLE
MOBILITY SCORE: 19
MOVING FROM LYING ON BACK TO SITTING ON SIDE OF FLAT BED WITH BEDRAILS: A LITTLE
WALKING IN HOSPITAL ROOM: A LITTLE
TURNING FROM BACK TO SIDE WHILE IN FLAT BAD: A LITTLE
CLIMB 3 TO 5 STEPS WITH RAILING: A LITTLE

## 2024-02-28 ASSESSMENT — PAIN - FUNCTIONAL ASSESSMENT: PAIN_FUNCTIONAL_ASSESSMENT: 0-10

## 2024-02-28 ASSESSMENT — PAIN SCALES - GENERAL: PAINLEVEL_OUTOF10: 0 - NO PAIN

## 2024-02-28 NOTE — HH CARE COORDINATION
Home Care received a Referral for Nursing and Physical Therapy. We have processed the referral for a Start of Care on 2/29-3/1/24.     If you have any questions or concerns, please feel free to contact us at [phone]. Follow the prompts, enter your five digit zip code, and you will be directed to your care team on [facility] 2.

## 2024-02-28 NOTE — PROGRESS NOTES
Physical Therapy    Physical Therapy Treatment    Patient Name: Sylvester Carvalho  MRN: [id number]  Today's Date: 2/28/2024  Time Calculation  Start Time: 0928  Stop Time: 0945  Time Calculation (min): 17 min       Assessment/Plan   End of Session Communication: Bedside nurse  Assessment Comment: Pt presents with good effort throughout todays session. Motivated and willing to participate in all tasks. Receptive to cueing and demonstrates good carry over. Call light and all needs in reach.  End of Session Patient Position: Up in chair, Alarm off, not on at start of session        General Visit Information:   PT  Visit  PT Received On: 02/28/24  General  Prior to Session Communication: Bedside nurse  Patient Position Received: Up in chair, Alarm off, not on at start of session  General Comment: Pt agreeable to therapy this date.  Subjective     Precautions:  Precautions  Medical Precautions: Fall precautions, Cardiac precautions (MITT)  Post-Surgical Precautions: Move in the Tube    Vital Signs:  Vital Signs  Heart Rate:  (variable between 114 (resting) and 132 (post ambulation).)  SpO2:  (93 post ambulation)  Objective     Pain:  Pain Assessment  Pain Assessment: 0-10  Pain Score: 0 - No pain         Treatments:  Therapeutic Exercise  Therapeutic Exercise Performed: Yes  Therapeutic Exercise Activity 1: seated, BLE LAQ, marches, hip ABD/ADD,  15x cueing for slow controlled performance           Ambulation/Gait Training  Ambulation/Gait Training Performed: Yes  Ambulation/Gait Training 1  Surface 1: Level tile  Device 1: Rolling walker (FWW)  Assistance 1: Close supervision  Comments/Distance (ft) 1: Pt ambulated 200' with FWW, SUP. Pt demonstrates normalized gait pattern, slightly fast velocity with cues to slow pace. Good AD management with directional changes. Demonstrates ability to navigate around objects and in tight spaces without incident.  Transfers  Transfer: Yes  Transfers 2  Transfer From 2: Chair with arms  to  Transfer to 2: Stand  Technique 2: Stand to sit, Sit to stand  Transfer Device 2: Walker (FWW)  Transfer Level of Assistance 2: Modified independent  Trials/Comments 2: Pt performed STS from recliner<>FWW Phylicia. No VC required for sequencing. Pt maintains MITT/cardiac precautions throughout.  Stairs  Stairs: Yes  Stairs  Rails 1: Bilateral  Curb Step 1: No  Device 1: No device  Assistance 1: Close supervision  Comment/Number of Steps 1: Pt ascended/descended three 4'' stairs and two 6'' stairs 2x with BHR without incident, SUP. Pt performs in a reciprocal pattern. Brief standing rest break afterwards due to SOB.       Outcome Measures:  [facility] Basic Mobility  Turning from your back to your side while in a flat bed without using bedrails: A little  Moving from lying on your back to sitting on the side of a flat bed without using bedrails: A little  Moving to and from bed to chair (including a wheelchair): A little  Standing up from a chair using your arms (e.g. wheelchair or bedside chair): None  To walk in hospital room: A little  Climbing 3-5 steps with railing: A little  Basic Mobility - Total Score: 19  Education Documentation  Precautions, taught by Haley Ndiaye PTA at 2/28/2024 12:14 PM.  Learner: Patient  Readiness: Acceptance  Method: Explanation, Demonstration  Response: Verbalizes Understanding, Needs Reinforcement    Mobility Training, taught by Haley Ndiaye PTA at 2/28/2024 12:14 PM.  Learner: Patient  Readiness: Acceptance  Method: Explanation, Demonstration  Response: Verbalizes Understanding, Needs Reinforcement    Precautions, taught by Haley Ndiaye PTA at 2/27/2024  2:12 PM.  Learner: Patient  Readiness: Acceptance  Method: Explanation, Demonstration  Response: Verbalizes Understanding, Needs Reinforcement    Mobility Training, taught by Haley Ndiaye PTA at 2/27/2024  2:12 PM.  Learner: Patient  Readiness: Acceptance  Method: Explanation, Demonstration  Response: Verbalizes  Understanding, Needs Reinforcement    Education Comments  No comments found.        EDUCATION:  Outpatient Education  Individual(s) Educated: Patient  Education Provided: Body Mechanics, Home Exercise Program, Post-Op Precautions  Education Comment: Pt educated in gait, transfers, ther ex, stair training and precautions.  Encounter Problems       Encounter Problems (Active)       PT Problem       Pt will demonstrate mod I  with bed mobility to edge of bed.   (Progressing)       Start:  02/23/24    Expected End:  02/29/24            Pt will demonstrate mod I  with sit to stand/chair transfers with no A/D    (Progressing)       Start:  02/23/24    Expected End:  02/29/24            Pt will ambulate 200 feet with mod I  no A/D .   (Progressing)       Start:  02/23/24    Expected End:  02/29/24            Pt to demo 14 steps with  rails with SBA  (Progressing)       Start:  02/23/24    Expected End:  02/29/24               Pain - Adult

## 2024-02-28 NOTE — PROGRESS NOTES
Occupational Therapy                 Therapy Communication Note    Patient Name: Sylvester Carvalho  MRN: [id number]  Today's Date: 2/28/2024     Discipline: Occupational Therapy    Missed Visit Reason: Missed Visit Reason:  (Pt declined d/t scheduled d/c home today.  Pt reports he has walked in hallway with PT earlier.  Pt and spouse denied  questions/concerns.  Pt able to verbalize MITT precautions independently.)    Missed Time: Attempt 11:23

## 2024-02-28 NOTE — DISCHARGE SUMMARY
Discharge Diagnosis  CAD (coronary artery disease)    Issues Requiring Follow-Up  AFIB    Test Results Pending At Discharge  Pending Labs       No current pending labs.            Hospital Course  Sylvester Carvalho is a 78 y.o. male with PMHx of HTN, HLD, and nicotine dependence (quit smoking 20+ years ago but occasionally uses nicotine gum). At a routine PCP visit, coronary CT was recommended for screening. This revealed a calcium score of 4402 and patient was referred to Dr. Zelaya for further evaluation. He subsequently presented to [facility] 2/2/24 and underwent elective coronary angiogram revealing two vessel CAD including 90% stenosis of proximal LAD affecting the large diagonal. Surgical revascularization recommended but pt was stable to discharge and return electively. Pre-op testing was completed prior to discharging and pt was seen by Dr. Chung the following week.      On February 22, 2024 pt returned to [facility] and underwent CABGx3 with LIMA-LAD, SVG-PL, SVG-Diag; Endoscopic vein harvest; intraoperative LAUREN. EBL 250mL. CPB 78min. XC 70min. Pt transfused Platelets intraoperatively. He tolerated the procedure well and transferred to SICU in critical but stable condition intubated and sedated on low dose Norepinephrine. NEPI was weaned off with volume resuscitation. Pt extubated to nasal canula that evening.     The post operative period was complicated by atrial fibrillation. On POD#2 around 8AM pt went into AFIB RVR with rates peaking in 120s. He remained HDS and asymptomatic denying chest pain, palpitations, SOB, and dizziness. He was given Amiodarone IV bolus and then started on continuous infusion. Pt returned to sinus rhythm with rates in 60s roughly an hour after Amiodarone given. The following day, no recurrent arrhythmias noted on telemetry review and patient transitioned to oral Amiodarone (200mg PO BID) and downgraded to telemetry unit. The following day, pt evaluated by Cardiology and oral  "Amiodarone decreased to 200mg daily. The next morning pt had recurrent AFIB RVR with rates peaking in 140s. He remained HDS but this time did endorse palpitations, mild SOB, and an overall \"anxious feeling.\" He was given repeat bolus of Amiodarone and PO Amiodarone increased to 400mg BID. At time of discharge, pt going in and out of AFIB with better controlled rates. At rest pt will be 90s-100s when in AFIB; with activity 100s-120 but quickly stabilized with rest. Discussed with Dr. Chung and Dr. Lin who felt pt was stable for discharge. Pt and his wife were educated on checking pulse although pt has apple watch that checks HR and signs/symptoms of AFIB to go to ED. Both verbalized understanding.     The remainder of the post operative period was uneventful. Rena Lara, arterial line, chest tubes, Angulo, and central line were removed sequentially. Ventricular epicardial wires were CLIPPED at the skin on 2/27; education regarding retained wires provided. Pain regimen was adjusted for appropriate pain control. Diet advanced as tolerated. Bowel regimen implemented POD#1 and pt moved bowels the following day. He was diuresed for volume overload. Pre-operative weight 80.6kg, discharge weight 81.2kg; once daily oral Lasix continued x5 days at discharge. Education on weighing self in AM provided. Pt worked with therapy who felt he was stable to discharge to home with low intensity therapy. Home health care arranged prior to discharge.     At time of discharge, patient is in rate controlled AFIB in 90s. Normotensive. Maintaining adequate SpO2 on RA. Afebrile. Pain well controlled. Tolerating diet without nausea and moving bowels daily. Ambulating with walker independently with steady gait. Prior to discharge, pt and wife advised of restrictions to activity, lifting, bathing, and driving. Education regarding incision care, signs/symptoms infection, s/s unstable AFIB, and DC medications was also provided. Both Mr Carvalho and his " wife verbalized understanding to all instruction. Pt was discharged to home in hemodynamically stable condition. Surgical and cardiology follow up scheduled prior to discharge.         Pertinent Physical Exam At Time of Discharge  Physical Exam  Vitals and nursing note reviewed.   Constitutional:       General: He is not in acute distress.     Appearance: Normal appearance. He is normal weight.   HENT:      Head: Normocephalic and atraumatic.      Nose: Nose normal.      Mouth/Throat:      Mouth: Mucous membranes are moist.      Pharynx: Oropharynx is clear.   Eyes:      Extraocular Movements: Extraocular movements intact.      Pupils: Pupils are equal, round, and reactive to light.   Cardiovascular:      Rate and Rhythm: Normal rate. Rhythm irregular.      Comments: Rate controlled AFIB (90s) at time of exam. No murmur/gallop/rub. Normal S1 and S2    Pulmonary:      Effort: Pulmonary effort is normal.      Comments: Improving inspiratory volume. Achieving 1-1.5L with IS.   Slightly diminished at bases but otherwise clear to auscultation.   Sternotomy is well approximated without erythema or drainage. Trivial swelling at manubrium. Sternum stable.   Chest tube sited without concern for infection.   Abdominal:      Comments: Protuberant but  soft. Normoactive BS. Last BM 2/28.   Genitourinary:     Comments: Voiding freely   Musculoskeletal:      Cervical back: Normal range of motion and neck supple.      Comments: Generalized post operative weakness. Ambulates with walker independently with steady gait.   No peripheral edema.    Skin:     General: Skin is warm and dry.      Comments: R SVG harvest sites without concern for infection.    Neurological:      General: No focal deficit present.      Mental Status: He is alert and oriented to person, place, and time.   Psychiatric:         Mood and Affect: Mood normal.         Behavior: Behavior normal.         Home Medications     Medication List      START taking these  medications     amiodarone 200 mg tablet; Commonly known as: Pacerone; Take 1 tablet   (200 mg) by mouth 2 times a day. Take 2 tablets by mouth twice daily   starting with evening dose 2/28/24. Decrease to 1 table by mouth once   daily on 3/6/2024.   apixaban 5 mg tablet; Commonly known as: Eliquis; Take 1 tablet (5 mg)   by mouth every 12 hours.   furosemide 40 mg tablet; Commonly known as: Lasix; Take 1 tablet (40 mg)   by mouth once daily in the morning for 5 days. Once daily in the morning   for 5 days.   iron polysaccharides 150 mg iron capsule; Commonly known as:   Nu-Iron,Niferex; Take 1 capsule (150 mg) by mouth once daily. Do not start   before February 29, 2024.; Start taking on: February 29, 2024   magnesium oxide 400 mg (241.3 mg magnesium) tablet; Commonly known as:   Mag-Ox; Take 1 tablet (400 mg) by mouth once daily. Do not start before   February 29, 2024.; Start taking on: February 29, 2024   metoprolol tartrate 25 mg tablet; Commonly known as: Lopressor; Take 1   tablet (25 mg) by mouth 2 times a day.   potassium chloride CR 20 mEq ER tablet; Commonly known as: Klor-Con M20;   Take 1 tablet (20 mEq) by mouth 2 times a day with meals for 5 days. Do   not crush or chew. Do not start before February 29, 2024.; Start taking   on: February 29, 2024   traMADol 50 mg tablet; Commonly known as: Ultram; Take 1 tablet (50 mg)   by mouth every 6 hours if needed for moderate pain (4 - 6) (moderate post   op pain.). ICD10: G89.18, I25.10     CONTINUE taking these medications     aspirin 81 mg chewable tablet; Chew 1 tablet (81 mg) once daily.   atorvastatin 40 mg tablet; Commonly known as: Lipitor; Take 1 tablet (40   mg) by mouth once daily.   cholecalciferol 50 MCG (2000 UT) tablet; Commonly known as: Vitamin D-3   pantoprazole 40 mg EC tablet; Commonly known as: ProtoNix; Take 1 tablet   (40 mg) by mouth once daily in the morning. Take before meals. Do not   crush, chew, or split.   PRESERVISION AREDS 2 PLUS  MV ORAL     STOP taking these medications     amLODIPine 2.5 mg tablet; Commonly known as: Norvasc   benazepriL-hydrochlorothiazide 20-25 mg tablet; Commonly known as:   Lotensin HCT   mupirocin 2 % ointment; Commonly known as: Bactroban   nitroglycerin 0.4 mg SL tablet; Commonly known as: Nitrostat       Outpatient Follow-Up  Future Appointments   Date Time Provider Department Center   3/7/2024 11:15 AM FABRICIO [id number] NURSE [id number] [place]   4/1/2024  3:15 PM Tom Alfonso MD [id number] [place]   4/4/2024  1:45 PM Amarilys Chung MD [id number] [place]   7/8/2024  9:00 AM Cedric Ashley DO DO[address]       Keila Buckley, APRN-CNP

## 2024-02-28 NOTE — PROGRESS NOTES
Critical access hospital Heart Progress Note           Rounding THUY/Cardiologist:  Arnoldo Lin DO, Dr. Arnoldo Lin  Primary Cardiologist: Dr. Pedro Zelaya    Date:  2/28/2024  Patient:  Sylvester Carvalho  YOB: 1945  MRN:  [id number]   Admit Date:  2/22/2024      SUBJECTIVE:    2/28/24  Patient sitting up in chair slightly frustrated this morning spoke to him at length that he did go back in A-fib states he is not having any chest pain or palpitations  EKG is atrial flutter      2/27/24  Patient sitting up in chair eating breakfast at this time he states he feels pretty good occasionally he feels some palpitations in his chest.  He is well-informed of plan of care  Telemetry is A-fib rate 104  EKG is atrial fibrillation    2/26/24  Sylvester Carvalho is a 78 y.o.  male patient who is being at the request of Dr. Chung for inpatient consultation of  s/p CABG . He was admitted on 2/22/2024.  Previous [facility] and [facility] records have been reviewed in detail.    Patient with a history of CAD, hypertension, dyslipidemia, EF 50%.  Patient came in on 2/22/2024 had triple-vessel CABG done on.  LIMA to the LAD  Saphenous vein graft to the PL  Saphenous vein graft to the diagonal  Recovered well in the ICU he is currently on telemetry he denies having any chest pain he is very thankful for having the procedure.  He denies chest pain, shortness of breath, nausea, vomiting, PND, orthopnea, claudications  Telemetry is normal sinus rhythm occ PVC  EKG is normal sinus rhythm with occasional PVC no acute changes     Cardiac history  2/1/24  1. Distal Left Main: 10-30% stenosis.   2. Proximal LAD Lesion: The percent stenosis is 80%.   3. Mid LAD Lesion: The percent stenosis is 40%.   4. Entire CX Lesion: The percent stenosis is <10%.   5. Distal and proximal RCA Lesion: The percent stenosis is 10-30%.   6. Prox PLVB RCA Lesion: The percent stenosis is 90 %.   7. The Left Ventricular Ejection Fraction is 60%.         CONCLUSIONS:   1. Left ventricular systolic function is normal with a 60-65% estimated ejection fraction.   2. Spectral Doppler shows an impaired relaxation pattern of left ventricular diastolic filling.   3. Mild tricuspid regurgitation is visualized.     QUANTITATIVE DATA SUMMARY:         VITALS:     Vitals:    02/27/24 1945 02/28/24 0020 02/28/24 0810 02/28/24 0905   BP: 111/66 116/74  120/73   BP Location: Left arm Right arm  Right arm   Patient Position: Lying Lying  Lying   Pulse: 72 58  (!) 118   Resp: 15 16  18   Temp: 36.1 °C (97 °F) 36.1 °C (97 °F)  36 °C (96.8 °F)   TempSrc: Temporal Temporal  Skin   SpO2: 94% 95% 94% 94%   Weight:       Height:           Intake/Output Summary (Last 24 hours) at 2/28/2024 1039  Last data filed at 2/28/2024 0221  Gross per 24 hour   Intake 180 ml   Output 1800 ml   Net -1620 ml       Wt Readings from Last 4 Encounters:   02/27/24 81.2 kg (179 lb 0.2 oz)   02/08/24 82.6 kg (182 lb)   02/02/24 83 kg (182 lb 15.7 oz)   01/31/24 82.7 kg (182 lb 6.4 oz)       CURRENT HOSPITAL MEDICATIONS:   amiodarone, 400 mg, oral, BID  [Held by provider] amLODIPine, 2.5 mg, oral, Daily  apixaban, 5 mg, oral, q12h  aspirin, 81 mg, oral, Daily  atorvastatin, 40 mg, oral, Daily  cholecalciferol, 2,000 Units, oral, Daily  docusate sodium, 100 mg, oral, TID  furosemide, 40 mg, intravenous, Daily  guaiFENesin, 1,200 mg, oral, BID  insulin lispro, 0-15 Units, subcutaneous, Before meals & nightly  iron polysaccharides, 150 mg, oral, Daily  lidocaine, 1 patch, transdermal, Daily  magnesium oxide, 400 mg, oral, Daily  metoprolol tartrate, 25 mg, oral, BID  pantoprazole, 40 mg, oral, Daily before breakfast  polyethylene glycol, 17 g, oral, BID  potassium chloride CR, 20 mEq, oral, BID with meals         Current Outpatient Medications   Medication Instructions    amLODIPine (NORVASC) 2.5 mg, oral, Daily    aspirin 81 mg, oral, Daily    atorvastatin (LIPITOR) 40 mg, oral, Daily     benazepriL-hydrochlorothiazide (Lotensin HCT) 20-25 mg tablet 1 tablet, oral, Daily    cholecalciferol (Vitamin D-3) 50 MCG (2000 UT) tablet 1 tablet, oral, Daily    mupirocin (Bactroban) 2 % ointment Topical, 2 times daily, TWICE ADAY  SINCE SUNDAY 2/18/24    mv-min/FA/vit K/lutein/zeaxant (PRESERVISION AREDS 2 PLUS MV ORAL) oral    nitroglycerin (NITROSTAT) 0.4 mg, sublingual, Every 5 min PRN, May repeat every 5 minutes for up to 3 doses.    pantoprazole (PROTONIX) 40 mg, oral, Daily before breakfast, Do not crush, chew, or split.        PHYSICAL EXAMINATION:   GENERAL:  Well developed, well nourished, in no acute distress.  CHEST:  Symmetric and nontender.  NEURO/PSYCH:  Alert and oriented times three with approppriate behavior and responses.  NECK:  Supple, no JVD, no bruit.  LUNGS:  Clear to auscultation bilaterally, normal respiratory effort.  HEART: s1, s2 irregular        There are no rubs, clicks or heaves.  EXTREMITIES:  Warm with good color, no clubbing or cyanosis.  There is no edema noted.  PERIPHERAL VASCULAR:  Pulses present and equally palpable; 2+ throughout.      LAB DATA:     CBC:   Results from last 7 days   Lab Units 02/28/24  0503 02/27/24  0450 02/26/24  0420   WBC AUTO x10*3/uL 7.1 7.4 6.7   RBC AUTO x10*6/uL 2.93* 3.06* 2.82*   HEMOGLOBIN g/dL 9.4* 9.8* 8.9*   HEMATOCRIT % 27.5* 29.2* 26.9*   MCV fL 94 95 95   MCH pg 32.1 32.0 31.6   MCHC g/dL 34.2 33.6 33.1   RDW % 12.8 12.9 13.0   PLATELETS AUTO x10*3/uL 186 184 129*     CMP:    Results from last 7 days   Lab Units 02/28/24  0503 02/27/24  0450 02/26/24  0420   SODIUM mmol/L 138 137 139   POTASSIUM mmol/L 4.1 3.5 3.9   CHLORIDE mmol/L 105 102 105   CO2 mmol/L 23 26 28   BUN mg/dL 27* 26* 21   CREATININE mg/dL 0.98 1.08 0.92   GLUCOSE mg/dL 110* 106* 100*   CALCIUM mg/dL 8.9 9.1 8.7     BMP:    Results from last 7 days   Lab Units 02/28/24  0503 02/27/24  0450 02/26/24  0420   SODIUM mmol/L 138 137 139   POTASSIUM mmol/L 4.1 3.5 3.9    CHLORIDE mmol/L 105 102 105   CO2 mmol/L 23 26 28   BUN mg/dL 27* 26* 21   CREATININE mg/dL 0.98 1.08 0.92   CALCIUM mg/dL 8.9 9.1 8.7   GLUCOSE mg/dL 110* 106* 100*     Magnesium:  Results from last 7 days   Lab Units 02/28/24  0503 02/27/24  0450 02/26/24  0420   MAGNESIUM mg/dL 2.08 2.14 2.20     Troponin:      BNP:     Lipid Panel:         DIAGNOSTIC TESTING:   @No results found for this or any previous visit.    ECG 12 lead    Result Date: 2/27/2024  Atrial fibrillation with rapid ventricular response with premature ventricular or aberrantly conducted complexes Nonspecific T wave abnormality Abnormal ECG When compared with ECG of 27-FEB-2024 06:40, (unconfirmed) Atrial fibrillation has replaced Sinus rhythm Vent. rate has increased BY  37 BPM    XR chest 1 view    Result Date: 2/27/2024  Interpreted By:  Jerel Malagon, STUDY: XR CHEST 1 VIEW;  2/27/2024 5:40 am   INDICATION: Signs/Symptoms:post op cardiac surgery.   COMPARISON: Portable chest, 25 February 2024   ACCESSION NUMBER(S): [id number]   ORDERING CLINICIAN: MIL PRETTY   TECHNIQUE: Single frontal view of the chest; Portable technique   FINDINGS: Right central line has been removed   No other interval change   The cardiomediastinal silhouette is unchanged   Wedge-shaped focal small area of atelectasis in the periphery of the left midlung where chest tube previously resided, not an unexpected radiographic finding   No acute airspace disease   No large pleural effusion or demonstrable pneumothorax       No acute unexpected radiographic findings after cardiac surgery and more recent removal of the right IJ central line   MACRO: None   Signed by: Jerel Malagon 2/27/2024 7:57 AM Dictation workstation:   [id number]    ECG 12 Lead    Result Date: 2/27/2024  Normal sinus rhythm Nonspecific ST and T wave abnormality Abnormal ECG When compared with ECG of 26-FEB-2024 06:28, (unconfirmed) Premature ventricular complexes are no longer Present       XR chest 1  view   Final Result   No acute unexpected radiographic findings after cardiac surgery and   more recent removal of the right IJ central line        MACRO:   None        Signed by: Jerel Malagon 2/27/2024 7:57 AM   Dictation workstation:   [id number]      XR chest 1 view   Final Result   Pleural and parenchymal disease. Enlarged cardiac silhouette.        MACRO:   none        Signed by: Марина Juan 2/25/2024 8:34 AM   Dictation workstation:   [id number]      XR chest 1 view   Final Result   1.  Bibasilar irregular atelectasis and/or small infiltrates more   prominent from prior.                  MACRO:   None.        Signed by: Oscar Loredo 2/24/2024 12:52 PM   Dictation workstation:   [id number]      XR chest 1 view   Final Result   No evidence of pneumothorax status post chest tube removal.        MACRO:   None.        Signed by: Sohan Knight 2/23/2024 7:40 PM   Dictation workstation:   [id number]      XR chest 1 view   Final Result   Removal of NG and endotracheal tubes. No significant change otherwise   with trace bilateral pleural effusions and left lower lung airspace   opacities. No pneumothorax.        MACRO   None        Signed by: Lilian Ray 2/23/2024 10:59 AM   Dictation workstation:   [id number]      XR chest 1 view   Final Result   The tubes and lines are well positioned.        Mild cardiomegaly and central vascular prominence.        Small component of bibasilar atelectasis.        Continued clinical and radiographic follow-up is recommended.        Signed by: Arnoldo Queen 2/22/2024 1:43 PM   Dictation workstation:   [id number]      XR chest 1 view   Final Result   1.  No active cardiopulmonary disease.        Signed by: Frederic Patel 2/22/2024 10:08 AM   Dictation workstation:   [id number]      Anesthesia Intraoperative Transesophageal Echocardiogram    (Results Pending)       No echocardiogram results found for the past 14 days    RADIOLOGY:     XR chest 1 view   Final Result    No acute unexpected radiographic findings after cardiac surgery and   more recent removal of the right IJ central line        MACRO:   None        Signed by: Jerel Malagon 2/27/2024 7:57 AM   Dictation workstation:   [id number]      XR chest 1 view   Final Result   Pleural and parenchymal disease. Enlarged cardiac silhouette.        MACRO:   none        Signed by: Марина Juan 2/25/2024 8:34 AM   Dictation workstation:   [id number]      XR chest 1 view   Final Result   1.  Bibasilar irregular atelectasis and/or small infiltrates more   prominent from prior.                  MACRO:   None.        Signed by: Oscar Loredo 2/24/2024 12:52 PM   Dictation workstation:   [id number]      XR chest 1 view   Final Result   No evidence of pneumothorax status post chest tube removal.        MACRO:   None.        Signed by: Sohan Knight 2/23/2024 7:40 PM   Dictation workstation:   [id number]      XR chest 1 view   Final Result   Removal of NG and endotracheal tubes. No significant change otherwise   with trace bilateral pleural effusions and left lower lung airspace   opacities. No pneumothorax.        MACRO   None        Signed by: Lilian Ray 2/23/2024 10:59 AM   Dictation workstation:   [id number]      XR chest 1 view   Final Result   The tubes and lines are well positioned.        Mild cardiomegaly and central vascular prominence.        Small component of bibasilar atelectasis.        Continued clinical and radiographic follow-up is recommended.        Signed by: Arnoldo Queen 2/22/2024 1:43 PM   Dictation workstation:   [id number]      XR chest 1 view   Final Result   1.  No active cardiopulmonary disease.        Signed by: Frederic Patel 2/22/2024 10:08 AM   Dictation workstation:   [id number]      Anesthesia Intraoperative Transesophageal Echocardiogram    (Results Pending)       PROBLEM LIST     Patient Active Problem List   Diagnosis    History of squamous cell carcinoma    Allergic rhinitis     Atypical nevus    Benign hypertension    Cataract, nuclear sclerotic, both eyes    Diverticulosis of colon    Enlarged prostate with lower urinary tract symptoms (LUTS)    Eustachian tube dysfunction    Exudative age-related macular degeneration of both eyes with active choroidal neovascularization (CMS/HCC)    GERD (gastroesophageal reflux disease)    HLD (hyperlipidemia)    Hordeolum externum (stye)    Hyperbilirubinemia    Inflamed skin tag    Internal hordeolum of left eye    Macular degeneration    Both eyes affected by degenerative myopia with choroidal neovascularization    Osteoarthrosis    BMI 28.0-28.9,adult    Vitamin D deficiency    Pencilling of stools    Encounter for screening for malignant neoplasm of colon    Cyst of bone of right hand    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     Bedside examination evaluation performed by me     Chart review detail discussed the patient and his family and the staff.     Impression:  CAD status post CABG postop day 5  Hypertension  Dyslipidemia  EF 50%  Paroxysmal atrial fibrillation          PLAN:   Recommendation:    Aspirin 81 daily  Lopressor 25 mg p.o. twice daily  Mag oxide mg 400 daily  Lipitor 40 mg daily  Amiodarone with adjusted dose  Daily EKGs  Lopressor 2.5 mg IV push x 1 now  Eliquis 5 mg p.o. twice daily for oral anticoagulation  Will discuss case with CT surgery  Probable discharge tomorrow    2/28/24  78-year-old gentleman seen evaluated bedside in the telemetry unit in conjunction with Brooks Coy RN, CNP.     Bedside examination evaluation performed by me     Chart review detail discussed the patient and  his family and the staff.     Impression:  CAD status post CABG postop day 5  Hypertension  Dyslipidemia  EF 50%  Paroxysmal atrial fibrillation    Recommendation:      Tele  monitoring continue  Spoke at length with CT surgery plan to keep 1 more day  Eliquis 5 mg p.o. twice daily  Lopressor 25 mg p.o. twice daily, continue amiodarone  Continue to follow along    Discussion:  Patient is clinically stable.  Rate is controlled however he does fluctuate back-and-forth into an atrial fibrillation and atrial flutter.  He is on anticoagulation and antiarrhythmic therapy along with rate control.  Would be no utility in trying cardioversion at this point since the patient continues to go back and forth.  If the patient is otherwise stable would be reasonable for him to be discharged at the discretion of the surgical team and follow-up in the office.  If requested EP service can see the patient as well.      Arnoldo Lin DO,[facility]      Of note, this documentation is completed using the Dragon Dictation system (voice recognition software). There may be spelling and/or grammatical errors that were not corrected prior to final submission.    Please do not hesitate to call with questions.  Electronically signed by Arnoldo Lin DO, on 2/28/2024 at 10:39 AM

## 2024-02-28 NOTE — NURSING NOTE
Pt slept through the night.  Denies pain.  No acute events overnight.  Continue to monitor.  NSR on tele monitor.  Call light within reach.  Bed in low position.

## 2024-02-29 ENCOUNTER — PATIENT OUTREACH (OUTPATIENT)
Dept: CARDIOLOGY | Facility: CLINIC | Age: 79
End: 2024-02-29
Payer: MEDICARE

## 2024-02-29 NOTE — PROGRESS NOTES
Discharge Facility:   [facility]  Discharge Diagnosis:  CABG X3, CAD  Admission Date: 2/22/24  Discharge Date: 2/28/24    PCP Appointment Date: Dr Ashley 3/4/24  Specialist Appointment Date: Cardiac surgery 3/7/24, Dr Lin 3/26/24  Hospital Encounter and Summary: Linked   See discharge assessment below for further details     Engagement  Call Start Time: 0841 (2/29/2024  9:00 AM)    Medications  Medications reviewed with patient/caregiver?: Yes (2/29/2024  9:00 AM)  Is the patient having any side effects they believe may be caused by any medication additions or changes?: No (2/29/2024  9:00 AM)  Does the patient have all medications ordered at discharge?: Yes (2/29/2024  9:00 AM)  Is the patient taking all medications as directed (includes completed medication regime)?: Yes (2/29/2024  9:00 AM)    Appointments  Does the patient have a primary care provider?: Yes (2/29/2024  9:00 AM)  Care Management Interventions: Verified appointment date/time/provider (2/29/2024  9:00 AM)  Care Management Interventions: Advised patient to keep appointment (2/29/2024  9:00 AM)    Self Management  What is the home health agency?: [facility] educated patient on 72 hours turn around time for visit and they he can call this CM if not contact or HHC (2/29/2024  9:00 AM)  What Durable Medical Equipment (DME) was ordered?: n/a (2/29/2024  9:00 AM)    Patient Teaching  Does the patient have access to their discharge instructions?: Yes (2/29/2024  9:00 AM)  Care Management Interventions: Reviewed instructions with patient (2/29/2024  9:00 AM)  What is the patient's perception of their health status since discharge?: Improving (2/29/2024  9:00 AM)  Is the patient/caregiver able to teach back the hierarchy of who to call/visit for symptoms/problems? PCP, Specialist, Home Health nurse, Urgent Care, ED, 911: Yes (2/29/2024  9:00 AM)  Patient/Caregiver Education Comments: Educated patient on S/S of A-fib, Monitoring his incisions for s/s of  infection. Reviewed medications. Reviewed IS and sternal precautions (2/29/2024  9:00 AM)    Wrap Up  Wrap Up Additional Comments: Patient denies CP or SOB states his pain is a 0. Patient states understanding of s/s of A-fib and he also has an apple watch that he says tells him if he goes into A-fib. Patient lives with his wife and she is helping to monitor incison area. They report no drainage, no increased redness or pain. They state the s/s of infecion. This CM made a follow up appointment with Dr Ashley. Reviewed upcoming appointments with patient and wife as well (2/29/2024  9:00 AM)  Call End Time: 0905 (2/29/2024  9:00 AM)

## 2024-03-01 ENCOUNTER — HOME CARE VISIT (OUTPATIENT)
Dept: HOME HEALTH SERVICES | Facility: HOME HEALTH | Age: 79
End: 2024-03-01
Payer: MEDICARE

## 2024-03-01 VITALS
RESPIRATION RATE: 20 BRPM | SYSTOLIC BLOOD PRESSURE: 112 MMHG | OXYGEN SATURATION: 96 % | TEMPERATURE: 96.7 F | HEART RATE: 56 BPM | DIASTOLIC BLOOD PRESSURE: 66 MMHG

## 2024-03-01 PROCEDURE — 169592 NO-PAY CLAIM PROCEDURE

## 2024-03-01 PROCEDURE — 1090000002 HH PPS REVENUE DEBIT

## 2024-03-01 PROCEDURE — 1090000001 HH PPS REVENUE CREDIT

## 2024-03-01 PROCEDURE — G0299 HHS/HOSPICE OF RN EA 15 MIN: HCPCS | Mod: HHH

## 2024-03-01 PROCEDURE — 0023 HH SOC

## 2024-03-01 ASSESSMENT — ENCOUNTER SYMPTOMS
PAIN SEVERITY GOAL: 0/10
DEPRESSION: 0
HIGHEST PAIN SEVERITY IN PAST 24 HOURS: 0/10
LAST BOWEL MOVEMENT: 66900
APPETITE LEVEL: FAIR
SUBJECTIVE PAIN PROGRESSION: UNCHANGED
CHANGE IN APPETITE: UNCHANGED
OCCASIONAL FEELINGS OF UNSTEADINESS: 0
DENIES PAIN: 1
LOSS OF SENSATION IN FEET: 0
LOWEST PAIN SEVERITY IN PAST 24 HOURS: 0/10
PERSON REPORTING PAIN: PATIENT

## 2024-03-01 ASSESSMENT — PAIN SCALES - PAIN ASSESSMENT IN ADVANCED DEMENTIA (PAINAD)
BODYLANGUAGE: 0 - RELAXED.
NEGVOCALIZATION: 0
CONSOLABILITY: 0 - NO NEED TO CONSOLE.
FACIALEXPRESSION: 0 - SMILING OR INEXPRESSIVE.
NEGVOCALIZATION: 0 - NONE.
CONSOLABILITY: 0
BODYLANGUAGE: 0
TOTALSCORE: 0
BREATHING: 0
FACIALEXPRESSION: 0

## 2024-03-01 ASSESSMENT — ACTIVITIES OF DAILY LIVING (ADL): ENTERING_EXITING_HOME: MODERATE ASSIST

## 2024-03-01 NOTE — HOME HEALTH
SKILLED NURSING VISIT FOR ADMISSION TO HOMECARE SERVICES ASSESSMENT TEACHING OF MEDICATIONS DISEASE PROCESS. PT TOLERATED PROCEDURES WELL. PT DECLINES ADDITIONAL SN VISITS. PT MONITORING BLOOD PRESSURE HEART RAT AND WEIGHT DAILY AND INDICATES HE KNOWS WHEN TO CALL MD. PT ACCEPTS PT AND OT EVALUATIONS.

## 2024-03-02 ENCOUNTER — HOME CARE VISIT (OUTPATIENT)
Dept: HOME HEALTH SERVICES | Facility: HOME HEALTH | Age: 79
End: 2024-03-02
Payer: MEDICARE

## 2024-03-02 PROCEDURE — 1090000002 HH PPS REVENUE DEBIT

## 2024-03-02 PROCEDURE — G0151 HHCP-SERV OF PT,EA 15 MIN: HCPCS | Mod: HHH

## 2024-03-02 PROCEDURE — 1090000001 HH PPS REVENUE CREDIT

## 2024-03-02 ASSESSMENT — ENCOUNTER SYMPTOMS
DENIES PAIN: 1
PERSON REPORTING PAIN: PATIENT
PAIN SEVERITY GOAL: 0/10
SUBJECTIVE PAIN PROGRESSION: UNCHANGED
LOWEST PAIN SEVERITY IN PAST 24 HOURS: 0/10
HIGHEST PAIN SEVERITY IN PAST 24 HOURS: 0/10

## 2024-03-02 ASSESSMENT — ACTIVITIES OF DAILY LIVING (ADL): AMBULATION ASSISTANCE ON FLAT SURFACES: 1

## 2024-03-03 PROCEDURE — 1090000002 HH PPS REVENUE DEBIT

## 2024-03-03 PROCEDURE — 1090000001 HH PPS REVENUE CREDIT

## 2024-03-04 ENCOUNTER — OFFICE VISIT (OUTPATIENT)
Dept: PRIMARY CARE | Facility: CLINIC | Age: 79
End: 2024-03-04
Payer: MEDICARE

## 2024-03-04 ENCOUNTER — HOME CARE VISIT (OUTPATIENT)
Dept: HOME HEALTH SERVICES | Facility: HOME HEALTH | Age: 79
End: 2024-03-04
Payer: MEDICARE

## 2024-03-04 VITALS
DIASTOLIC BLOOD PRESSURE: 72 MMHG | BODY MASS INDEX: 28.93 KG/M2 | HEART RATE: 47 BPM | WEIGHT: 180 LBS | TEMPERATURE: 97.3 F | OXYGEN SATURATION: 97 % | HEIGHT: 66 IN | SYSTOLIC BLOOD PRESSURE: 126 MMHG

## 2024-03-04 DIAGNOSIS — K21.9 GASTROESOPHAGEAL REFLUX DISEASE WITHOUT ESOPHAGITIS: ICD-10-CM

## 2024-03-04 DIAGNOSIS — I10 BENIGN HYPERTENSION: ICD-10-CM

## 2024-03-04 DIAGNOSIS — Z87.891 FORMER CIGARETTE SMOKER: ICD-10-CM

## 2024-03-04 DIAGNOSIS — Z95.1 S/P CORONARY ARTERY BYPASS GRAFT X 3: Primary | ICD-10-CM

## 2024-03-04 DIAGNOSIS — E78.2 MIXED HYPERLIPIDEMIA: ICD-10-CM

## 2024-03-04 PROCEDURE — G0152 HHCP-SERV OF OT,EA 15 MIN: HCPCS | Mod: HHH | Performed by: OCCUPATIONAL THERAPIST

## 2024-03-04 PROCEDURE — 1036F TOBACCO NON-USER: CPT | Performed by: FAMILY MEDICINE

## 2024-03-04 PROCEDURE — 1126F AMNT PAIN NOTED NONE PRSNT: CPT | Performed by: FAMILY MEDICINE

## 2024-03-04 PROCEDURE — 1090000003 HH PPS REVENUE ADJ

## 2024-03-04 PROCEDURE — 3078F DIAST BP <80 MM HG: CPT | Performed by: FAMILY MEDICINE

## 2024-03-04 PROCEDURE — G0152 HHCP-SERV OF OT,EA 15 MIN: HCPCS | Mod: HHH

## 2024-03-04 PROCEDURE — 99496 TRANSJ CARE MGMT HIGH F2F 7D: CPT | Performed by: FAMILY MEDICINE

## 2024-03-04 PROCEDURE — 1111F DSCHRG MED/CURRENT MED MERGE: CPT | Performed by: FAMILY MEDICINE

## 2024-03-04 PROCEDURE — 3074F SYST BP LT 130 MM HG: CPT | Performed by: FAMILY MEDICINE

## 2024-03-04 PROCEDURE — 1159F MED LIST DOCD IN RCRD: CPT | Performed by: FAMILY MEDICINE

## 2024-03-04 PROCEDURE — 1090000002 HH PPS REVENUE DEBIT

## 2024-03-04 PROCEDURE — 1157F ADVNC CARE PLAN IN RCRD: CPT | Performed by: FAMILY MEDICINE

## 2024-03-04 PROCEDURE — 1090000001 HH PPS REVENUE CREDIT

## 2024-03-04 ASSESSMENT — ACTIVITIES OF DAILY LIVING (ADL)
DRESSING_UB_CURRENT_FUNCTION: INDEPENDENT
TOILETING: INDEPENDENT
TOILETING: 1
AMBULATION ASSISTANCE: MODERATE ASSIST
DRESSING_LB_CURRENT_FUNCTION: INDEPENDENT
CURRENT_FUNCTION: INDEPENDENT
PHYSICAL TRANSFERS ASSESSED: 1
AMBULATION ASSISTANCE: 1

## 2024-03-04 ASSESSMENT — PATIENT HEALTH QUESTIONNAIRE - PHQ9
2. FEELING DOWN, DEPRESSED OR HOPELESS: NOT AT ALL
1. LITTLE INTEREST OR PLEASURE IN DOING THINGS: NOT AT ALL
SUM OF ALL RESPONSES TO PHQ9 QUESTIONS 1 AND 2: 0

## 2024-03-04 ASSESSMENT — ENCOUNTER SYMPTOMS: DENIES PAIN: 1

## 2024-03-04 NOTE — PROGRESS NOTES
Patient is here for follow-up of discharge from 28 open heart surgery.  He we done a calcium score screening was sent to cardiology and found to have multiple vessel disease.  Is doing excellent and is up walking around relatively doing well with his pain.  He has follow-up with OT also has been doing good with PT.  Blood pressure has been stable he has lost 5 pounds and otherwise is doing well.    REVIEW OF SYSTEMS: 12 systems negative except for those mentioned in the HPI.    PHYSICAL EXAMINATION:   Constitutional: The patient is alert, in no acute  distress.  Eyes: Extraocular movements are intact. Pupils are equal and reactive to light  ENT: External TMs are clear  Neck: Supple without lymphadenopathy or JVD.  Heart: Regular rate and rhythm without murmur, click, gallop, or rub.  Lungs: Clear to auscultation bilaterally. No rales, rhonchi, or  wheezing.  Psychiatric: Good judgment and insight. Normal affect and mood.  Lymphatic: as noted above.  Skin: Well-healing midline chest incision with 3 poke holes in the abdomen.    Assessment:  per EMR    Plan:  I reviewed labs blood work x-rays.  Patient is doing excellent we will follow-up in June  This dictation was created using Dragon dictation and may contain errors

## 2024-03-05 LAB
ACT BLD: 107 SEC (ref 96–152)
ACT BLD: 114 SEC (ref 96–152)
ACT BLD: 440 SEC (ref 96–152)
ACT BLD: 576 SEC (ref 96–152)
ACT BLD: 584 SEC (ref 96–152)
ACT BLD: 645 SEC (ref 96–152)

## 2024-03-05 PROCEDURE — 1090000001 HH PPS REVENUE CREDIT

## 2024-03-05 PROCEDURE — 1090000002 HH PPS REVENUE DEBIT

## 2024-03-05 ASSESSMENT — ACTIVITIES OF DAILY LIVING (ADL): OASIS_M1830: 05

## 2024-03-06 ENCOUNTER — PATIENT OUTREACH (OUTPATIENT)
Dept: CARDIOLOGY | Facility: CLINIC | Age: 79
End: 2024-03-06
Payer: MEDICARE

## 2024-03-06 PROCEDURE — 1090000002 HH PPS REVENUE DEBIT

## 2024-03-06 PROCEDURE — 1090000001 HH PPS REVENUE CREDIT

## 2024-03-06 NOTE — PROGRESS NOTES
Call regarding appt. with PCP on 3/4/24 after hospitalization.  At time of outreach call the patient feels as if their condition has improved since last visit.  Reviewed the PCP appointment with the pt and addressed any questions or concerns.   Patient feels he is doing well. States his appetite has returned. States the only thing is that sometimes he needs to take a quick nap. Patient has no concerns

## 2024-03-07 ENCOUNTER — CLINICAL SUPPORT (OUTPATIENT)
Dept: CARDIAC SURGERY | Facility: CLINIC | Age: 79
End: 2024-03-07
Payer: MEDICARE

## 2024-03-07 VITALS
DIASTOLIC BLOOD PRESSURE: 70 MMHG | TEMPERATURE: 97.3 F | BODY MASS INDEX: 29.25 KG/M2 | HEART RATE: 49 BPM | OXYGEN SATURATION: 96 % | SYSTOLIC BLOOD PRESSURE: 114 MMHG | WEIGHT: 182 LBS | HEIGHT: 66 IN

## 2024-03-07 DIAGNOSIS — Z95.1 S/P CORONARY ARTERY BYPASS GRAFT X 3: ICD-10-CM

## 2024-03-07 PROCEDURE — 1090000002 HH PPS REVENUE DEBIT

## 2024-03-07 PROCEDURE — 1090000001 HH PPS REVENUE CREDIT

## 2024-03-07 NOTE — PROGRESS NOTES
This 78 year old status post CABG X 3 per Dr Chung at [facility] on 2/22/24.  Patient discharged from hospital on 2/28/24. Patient seen in office as a nurse post operative visit. Midline sternal incision is well approximated with no redness or drainage noted. Left leg vein harvest site is well approximated with no redness or drainage noted. Lungs are clear bilaterally. He has no complaints of pain at the present time. All medications reviewed and all questions answered. His heart rate is 49 at his office visit today and he a asymptomatic. He was advised to call his cardiologist for possible medication adjustment. He has 1+ edema of the left lower extremity and pedal pulses are palpable bilaterally. He has been taking short walks with with no shortness of breath. Heart sounds are normal. Bowel sounds are active with patient reporting normal bowel movements. His appetite has been good with diet reviewed and all questions answered. Instructed patient to follow up with his cardiologist and primary care physician. He has a follow up with Dr Chung with a chest x-ray prior to his visit. Patient instructed to call with any questions or concerns.

## 2024-03-08 DIAGNOSIS — I25.10 CORONARY ARTERY DISEASE INVOLVING NATIVE CORONARY ARTERY OF NATIVE HEART, UNSPECIFIED WHETHER ANGINA PRESENT: ICD-10-CM

## 2024-03-08 DIAGNOSIS — I48.0 PAROXYSMAL ATRIAL FIBRILLATION (MULTI): ICD-10-CM

## 2024-03-08 PROCEDURE — 1090000001 HH PPS REVENUE CREDIT

## 2024-03-08 PROCEDURE — 1090000002 HH PPS REVENUE DEBIT

## 2024-03-08 RX ORDER — AMIODARONE HYDROCHLORIDE 200 MG/1
200 TABLET ORAL 2 TIMES DAILY
Qty: 30 TABLET | Refills: 0 | Status: SHIPPED | OUTPATIENT
Start: 2024-03-08 | End: 2024-03-11 | Stop reason: SDUPTHER

## 2024-03-08 RX ORDER — METOPROLOL TARTRATE 25 MG/1
25 TABLET, FILM COATED ORAL 2 TIMES DAILY
Qty: 60 TABLET | Refills: 0 | Status: SHIPPED | OUTPATIENT
Start: 2024-03-08 | End: 2024-03-13 | Stop reason: SDUPTHER

## 2024-03-09 PROCEDURE — 1090000001 HH PPS REVENUE CREDIT

## 2024-03-09 PROCEDURE — 1090000002 HH PPS REVENUE DEBIT

## 2024-03-10 PROCEDURE — 1090000001 HH PPS REVENUE CREDIT

## 2024-03-10 PROCEDURE — 1090000002 HH PPS REVENUE DEBIT

## 2024-03-11 DIAGNOSIS — I48.0 PAROXYSMAL ATRIAL FIBRILLATION (MULTI): ICD-10-CM

## 2024-03-11 PROCEDURE — 1090000002 HH PPS REVENUE DEBIT

## 2024-03-11 PROCEDURE — 1090000001 HH PPS REVENUE CREDIT

## 2024-03-11 RX ORDER — AMIODARONE HYDROCHLORIDE 200 MG/1
200 TABLET ORAL 2 TIMES DAILY
Qty: 90 TABLET | Refills: 3 | Status: SHIPPED | OUTPATIENT
Start: 2024-03-11 | End: 2024-03-14 | Stop reason: SDUPTHER

## 2024-03-12 PROCEDURE — 1090000001 HH PPS REVENUE CREDIT

## 2024-03-12 PROCEDURE — 1090000002 HH PPS REVENUE DEBIT

## 2024-03-13 DIAGNOSIS — I48.0 PAROXYSMAL ATRIAL FIBRILLATION (MULTI): ICD-10-CM

## 2024-03-13 DIAGNOSIS — E78.2 MIXED HYPERLIPIDEMIA: ICD-10-CM

## 2024-03-13 DIAGNOSIS — I25.10 CORONARY ARTERY DISEASE INVOLVING NATIVE CORONARY ARTERY OF NATIVE HEART, UNSPECIFIED WHETHER ANGINA PRESENT: ICD-10-CM

## 2024-03-13 PROCEDURE — 1090000001 HH PPS REVENUE CREDIT

## 2024-03-13 PROCEDURE — 1090000002 HH PPS REVENUE DEBIT

## 2024-03-13 RX ORDER — METOPROLOL TARTRATE 25 MG/1
25 TABLET, FILM COATED ORAL 2 TIMES DAILY
Qty: 60 TABLET | Refills: 0 | Status: SHIPPED | OUTPATIENT
Start: 2024-03-13 | End: 2024-04-22

## 2024-03-13 NOTE — TELEPHONE ENCOUNTER
Received request for prescription refills for patient.   Patient follows with Arnoldo Lin D.O.      Last OV 1/31/24  Next OV 3/26/24    Pended for signing and sent to provider

## 2024-03-13 NOTE — TELEPHONE ENCOUNTER
Patient requests refill on Metoprolol Tartrate.  He has appointment 3/26/2024, but will run out prior to appointment

## 2024-03-14 PROCEDURE — 1090000001 HH PPS REVENUE CREDIT

## 2024-03-14 PROCEDURE — 1090000002 HH PPS REVENUE DEBIT

## 2024-03-14 RX ORDER — AMIODARONE HYDROCHLORIDE 200 MG/1
200 TABLET ORAL 2 TIMES DAILY
Qty: 90 TABLET | Refills: 3 | Status: SHIPPED | OUTPATIENT
Start: 2024-03-14 | End: 2024-04-11 | Stop reason: SDUPTHER

## 2024-03-14 RX ORDER — ATORVASTATIN CALCIUM 40 MG/1
40 TABLET, FILM COATED ORAL DAILY
Qty: 90 TABLET | Refills: 3 | Status: SHIPPED | OUTPATIENT
Start: 2024-03-14

## 2024-03-15 PROCEDURE — 1090000002 HH PPS REVENUE DEBIT

## 2024-03-15 PROCEDURE — 1090000001 HH PPS REVENUE CREDIT

## 2024-03-16 PROCEDURE — 1090000001 HH PPS REVENUE CREDIT

## 2024-03-16 PROCEDURE — 1090000002 HH PPS REVENUE DEBIT

## 2024-03-17 PROCEDURE — 1090000001 HH PPS REVENUE CREDIT

## 2024-03-17 PROCEDURE — 1090000002 HH PPS REVENUE DEBIT

## 2024-03-18 PROCEDURE — 1090000001 HH PPS REVENUE CREDIT

## 2024-03-18 PROCEDURE — 1090000002 HH PPS REVENUE DEBIT

## 2024-03-19 PROCEDURE — 1090000002 HH PPS REVENUE DEBIT

## 2024-03-19 PROCEDURE — 1090000001 HH PPS REVENUE CREDIT

## 2024-03-20 PROCEDURE — 1090000002 HH PPS REVENUE DEBIT

## 2024-03-20 PROCEDURE — 1090000001 HH PPS REVENUE CREDIT

## 2024-03-21 PROCEDURE — 1090000002 HH PPS REVENUE DEBIT

## 2024-03-21 PROCEDURE — 1090000001 HH PPS REVENUE CREDIT

## 2024-03-22 PROCEDURE — 1090000001 HH PPS REVENUE CREDIT

## 2024-03-22 PROCEDURE — 1090000002 HH PPS REVENUE DEBIT

## 2024-03-23 PROCEDURE — 1090000002 HH PPS REVENUE DEBIT

## 2024-03-23 PROCEDURE — 1090000001 HH PPS REVENUE CREDIT

## 2024-03-24 PROCEDURE — 1090000002 HH PPS REVENUE DEBIT

## 2024-03-24 PROCEDURE — 1090000001 HH PPS REVENUE CREDIT

## 2024-03-25 ENCOUNTER — TRANSCRIBE ORDERS (OUTPATIENT)
Dept: CARDIAC REHAB | Facility: CLINIC | Age: 79
End: 2024-03-25
Payer: MEDICARE

## 2024-03-25 DIAGNOSIS — Z95.1 S/P CABG X 3: Primary | ICD-10-CM

## 2024-03-25 PROCEDURE — 1090000002 HH PPS REVENUE DEBIT

## 2024-03-25 PROCEDURE — 1090000001 HH PPS REVENUE CREDIT

## 2024-03-26 ENCOUNTER — OFFICE VISIT (OUTPATIENT)
Dept: CARDIOLOGY | Facility: CLINIC | Age: 79
End: 2024-03-26
Payer: MEDICARE

## 2024-03-26 VITALS
DIASTOLIC BLOOD PRESSURE: 84 MMHG | HEIGHT: 66 IN | WEIGHT: 181.2 LBS | SYSTOLIC BLOOD PRESSURE: 124 MMHG | HEART RATE: 55 BPM | BODY MASS INDEX: 29.12 KG/M2

## 2024-03-26 DIAGNOSIS — I25.10 CORONARY ARTERY DISEASE INVOLVING NATIVE CORONARY ARTERY OF NATIVE HEART, UNSPECIFIED WHETHER ANGINA PRESENT: ICD-10-CM

## 2024-03-26 DIAGNOSIS — I10 BENIGN HYPERTENSION: ICD-10-CM

## 2024-03-26 DIAGNOSIS — R93.1 ELEVATED CORONARY ARTERY CALCIUM SCORE: ICD-10-CM

## 2024-03-26 DIAGNOSIS — E78.2 MIXED HYPERLIPIDEMIA: ICD-10-CM

## 2024-03-26 DIAGNOSIS — Z95.1 S/P CORONARY ARTERY BYPASS GRAFT X 3: ICD-10-CM

## 2024-03-26 DIAGNOSIS — Z87.891 FORMER CIGARETTE SMOKER: ICD-10-CM

## 2024-03-26 PROCEDURE — 1157F ADVNC CARE PLAN IN RCRD: CPT | Performed by: INTERNAL MEDICINE

## 2024-03-26 PROCEDURE — 99215 OFFICE O/P EST HI 40 MIN: CPT | Performed by: INTERNAL MEDICINE

## 2024-03-26 PROCEDURE — 1090000001 HH PPS REVENUE CREDIT

## 2024-03-26 PROCEDURE — 1090000002 HH PPS REVENUE DEBIT

## 2024-03-26 PROCEDURE — 1111F DSCHRG MED/CURRENT MED MERGE: CPT | Performed by: INTERNAL MEDICINE

## 2024-03-26 PROCEDURE — 1159F MED LIST DOCD IN RCRD: CPT | Performed by: INTERNAL MEDICINE

## 2024-03-26 PROCEDURE — 3079F DIAST BP 80-89 MM HG: CPT | Performed by: INTERNAL MEDICINE

## 2024-03-26 PROCEDURE — 1036F TOBACCO NON-USER: CPT | Performed by: INTERNAL MEDICINE

## 2024-03-26 PROCEDURE — 3074F SYST BP LT 130 MM HG: CPT | Performed by: INTERNAL MEDICINE

## 2024-03-26 RX ORDER — NITROGLYCERIN 0.4 MG/1
0.4 TABLET SUBLINGUAL EVERY 5 MIN PRN
COMMUNITY
Start: 2024-02-02

## 2024-03-26 NOTE — PATIENT INSTRUCTIONS
Continue same medications/treatment.  Patient educated on proper medication use.  Patient educated on risk factor modification.  Please bring any lab results from other providers/physicians to your next appointment.    Please bring all medicines, vitamins, and herbal supplements with you when you come to the office.    Prescriptions will not be filled unless you are compliant with your follow up appointments or have a follow up appointment scheduled as per instruction of your physician. Refills should be requested at the time of your visit.    Follow up in 1 month    I, MICHELE URIAS RN, AM SCRIBING FOR AND IN THE PRESENCE OF DR. ASHISH DARLING, DO, FACC

## 2024-03-27 PROCEDURE — 1090000002 HH PPS REVENUE DEBIT

## 2024-03-27 PROCEDURE — 1090000001 HH PPS REVENUE CREDIT

## 2024-03-28 ENCOUNTER — CLINICAL SUPPORT (OUTPATIENT)
Dept: CARDIAC REHAB | Facility: CLINIC | Age: 79
End: 2024-03-28
Payer: MEDICARE

## 2024-03-28 DIAGNOSIS — Z95.1 S/P CABG X 3: Primary | ICD-10-CM

## 2024-03-28 PROCEDURE — 1090000001 HH PPS REVENUE CREDIT

## 2024-03-28 PROCEDURE — 1090000002 HH PPS REVENUE DEBIT

## 2024-03-28 NOTE — PROGRESS NOTES
INDIVIDUAL CARDIAC TREATMENT PLAN-INITIAL ASSESSMENT     Name: Sylvester Carvalho   Today's Date: 24   : 1945   Primary Provider: JUNITO Ashley DO  MRN: [id number]   Referring Physician: AMANDA Lin MD      Diagnosis: CABGx3   Onset Date: 24      Risk Stratification: High      NUTRITION ASSESSMENT  Lipids:   Lipid Lab Date:2024  Total Chol: 164  HDL: 36.9  LDL: 89  Tri  Cholesterol Med: Atorvastatin 40mg daily     Diabetes: No  HgbA1c: 5.6  Date Checked: 2024  Monitors glucose at home: No  Fasting Blood Sugar Range:  Frequency:  Hypoglycemic Episode:    Weight Management  Weight: 180lbs   Height: 66inches   BMI:  29  Pre Body Composition: 27.5%  Post Body Composition: NA  Pre Waist Circumference: 40.5 inches   Post Waist Circumference: NA   Current Diet: Heart Healthy mostly, little red meat, mostly chicken, fish, fruits/vegetables   Barriers to dietary change: Denies     Initial Dietary Assessment Score: Pending results from dietician   Discharge Dietary Assessment Score: NA      NUTRITION PLAN  Nutrition Goals:   1. Improve Picture Your Plate assessment results by discharge.  2. Make changes to diet to include heart healthy options while in the program.    Nutrition Intervention/Education:   *Sent dietician Picture Your Plate assessment for scoring and recommendations.   *Will perform weekly weight checks on .   *Body Composition completed by Exercise Physiologist, 27.5%. Will re-measure at discharge. Goal to lose one inch at waist circumference by discharge.       OTHER CORE COMPONENTS/ RISK FACTORS ASSESSMENT  Medication compliance: good compliance  Using pill box: Yes  Carries medication list: No    Blood Pressure Management:  History of High BP: Yes  Resting BP: 142/84    Tobacco: FORMER  Form of tobacco: Cigarettes and Nicotine Gum (currently chews gum 3 times/daily)  Quit Date:  (smoked 1 ppd for 20 years)   Anyone in the house smoke: No    Initial Knowledge Test Score:  "11/15  Discharge Knowledge Test Score: NA     OTHER CORE COMPONENTS/ RISK FACTOR PLAN   Other Core components/Risk Factor Goals:                                                                                                                                                    1. Achieve and maintain a resting blood pressure less than 130/80 while in the program.  2. Gain knowledge of cardiac disease and lifestyle modifications related to exercise and ADL's prior to discharge.    Other Components/ Risk Factors Intervention/Education:  *Will monitor HR, BP, dyspnea and arrhythmias each session.   *Will meet with patient to discuss goals & progress.  *Encouraged review of education materials.       PSYCHOSOCIAL ASSESSMENT  Patient reported stress level: mild  Using stress management skills: Yes walking, working in the yard/garden   HX of anxiety: Yes when he used to work, occasionally now but typically when something needs done   HX of depression: No  Patient questioned regarding any new stress, depression and anxiety symptoms: Yes    Family/Support System: Wife and children (son in [place] and daughter in [place])  Seeing mental health provider: No  Psychosocial medications: Denies     Initial PHQ-9 score: 2 (no risk)   Discharge PHQ-9 score: NA  Was PHQ-9 faxed to provider: No    Quality of Life Survey:   Pre PCS: 46.91  Post PCS: NA    Pre MCS: 42.24  Post MCS: NA      Stages of change:  Preparation    PSYCHOSOCIAL PLAN  Psychosocial Goals:  1. Improve stage of change from preparation to action while in the program.  2. Maintain PHQ-9 category classification of \"no risk\" while in the program.     Psychosocial Interventions/ Education:  *Will provide one on one emotional support and will facilitate peer support within the context of other phase II patients while in the program.       EXERCISE ASSESSMENT  Home Exercise: Yes  Frequency: Almost daily, 30 minutes   Mode: Walking     EXERCISE PLAN  Exercise Goals:   1. Goal " of 6.4 METs by discharge.  2. Have a plan in place for continued exercise after the program by discharge.    Exercise Prescription:   Based on 12 Minute Walk Test  Frequency: 3 days per week  Duration (total aerobic min.): 30 minutes  Intensity RPE: 11-14  Target HR: Rest+30 (99-130bpm)   MET Level Range: 1.9-4      Modality METS Load  Duration   1 Warm Up    05:00   2 NuStep 3 60 martínez Level 5 06:00   3 Arms Airdyne 1.9 Level 0.4  06:00   4 Treadmill 4 2.5mph 3% 06:00   5 Upright Bike 3.4 36 martínez  Level 2 06:00   6 Recumbent Bike 3.4 36 martínez  Level 2 06:00   7 Cool Down     05:00     Exercise Intervention/Education:   *Aim to progress 1 MET every 5 weeks per reported symptoms/pain   *Will Incorporate resistance training for muscular endurance and strength when appropriate.      Date of first exercise session: Pending MD signature      LEARNING ASSESSMENT & BARRIERS  Readiness to Learn: Eager to participate; asks appropriate questions   Barriers: None      FALL RISK  high      INDIVIDUAL PATIENT GOALS:  1.To lose weight by discharge.  2.To achieve better general health by discharge         MEDICATIONS  Amiodarone 200mg BID, Apixaban 5mg BID, Aspirin 81mg daily, Atorvastatin 40mg daily, Vit. D3 2000units daily, Iron 150mg daily, Magesium 400mg daily, Metoprolol 25mg BID, M. Vit for eyes 1 tab daily, Nitroglycerin 0.4mg every 5 min. PRN, Pantoprazole 40mg daily      STAFF COMMENTS:   Patient here for orientation into ph 2 cardiac rehab, following CABGx3 (LIMA>LAD, SVG>PL, SVG>Diag) on 2/22/2024.  Accompanied by his wife, Jenniffer. Reviewed health history, current medication list, physical assessment, balance assessment and body composition measured. Patient is healing well and recovering with no complications.  He is currently up to walking 30 minutes daily, outside, weather permitting. He denies any discomfort or pain and feels he is tolerating new medications well though notes generalized fatigue.  He has an  appointment with Dr. Chung on 4/4/24 for follow up.  States he has great support from his wife and children/grandchildren.  He did complete an initial 12 minute walk test today, as part of his orientation.  Telemetry showing SB-SR with PACs noted. Demonstrates blunted BP and HR response to exercise.  He achieved 4.2 METs, denying any angina, discomfort or dyspnea when questioned. He looks forward to improving his cardiovascular health and wellness as well as he hopes to lose weight and increase his muscle mass. Thank you for referring your patient to our program.

## 2024-03-28 NOTE — PROGRESS NOTES
CARDIAC ASSESSMENT     Name: Sylvester Carvalho   : 1945   Diagnosis: CABGx3   MRN: [id number]   Onset Date: 24   Today's Date: 24      Cardiovascular   HX: CABGx3 (LIMA>LAD, SVG>PL, SVG>Diag) 24; HTN; HLD  Family HX of CAD:  Yes, both brothers   Angina: none  Describe:NA  Last Episode:NA  History of Heart Failure: No  EF: 60%   Onset of HF:NA  Last HF hospitalization:NA  Family HX of HF:  No  HF symptoms: NA     Devices: Denies   HX of PAD: No    Arrythmias: post op Afib with RVR; none since that he is aware of   Apical: regular  Heart Rate: 50bpm  BP: 142/84  Radial pulses:  R Present 2+ L Present 2+    Comments: Former smoker, quit  (1ppd x 20years); chews nicotine gum 3-4 times/daily       Respiratory  HX: Denies   Dyspnea:  No  Describe: NA  HX ARMOND:  No  CPAP Use:  No  Family History of Lung Disease:  Yes Dad-lung cancer     Resting O2 sat: 95% on RA   Lung Sounds: Clear   Locations: all lobes    Neurological   Orientation: oriented to person, place, time, and general circumstances  HX: Denies   History of stroke/TIA?: Denies     Comments: Macular degeneration but no vision changes noted       Skin  Skin Color: normal, no cyanosis, jaundice, pallor or bruising; h/o squamous cell carcinoma, gets annual checks   Edema: 1+ in LLE due to vein harvesting, has improved since surgery     Comments: Chest incision well healed, scabbed over.  Leg incisions well healed.     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

## 2024-03-29 PROCEDURE — 1090000002 HH PPS REVENUE DEBIT

## 2024-03-29 PROCEDURE — 1090000001 HH PPS REVENUE CREDIT

## 2024-03-30 PROCEDURE — 1090000002 HH PPS REVENUE DEBIT

## 2024-03-30 PROCEDURE — 1090000001 HH PPS REVENUE CREDIT

## 2024-03-30 PROCEDURE — 1090000003 HH PPS REVENUE ADJ

## 2024-04-01 ENCOUNTER — OFFICE VISIT (OUTPATIENT)
Dept: OPHTHALMOLOGY | Facility: CLINIC | Age: 79
End: 2024-04-01
Payer: MEDICARE

## 2024-04-01 DIAGNOSIS — H35.3231 EXUDATIVE AGE-RELATED MACULAR DEGENERATION OF BOTH EYES WITH ACTIVE CHOROIDAL NEOVASCULARIZATION (MULTI): Primary | ICD-10-CM

## 2024-04-01 PROCEDURE — 67028 INJECTION EYE DRUG: CPT | Mod: BILATERAL PROCEDURE | Performed by: OPHTHALMOLOGY

## 2024-04-01 PROCEDURE — 92134 CPTRZ OPH DX IMG PST SGM RTA: CPT | Mod: BILATERAL PROCEDURE | Performed by: OPHTHALMOLOGY

## 2024-04-01 ASSESSMENT — VISUAL ACUITY
METHOD: SNELLEN - LINEAR
OD_CC: 20/30+2
OS_CC: 20/60

## 2024-04-01 ASSESSMENT — TONOMETRY
OS_IOP_MMHG: 18
IOP_METHOD: GOLDMANN APPLANATION
OD_IOP_MMHG: 20

## 2024-04-01 ASSESSMENT — CUP TO DISC RATIO
OS_RATIO: 0.5
OD_RATIO: 0.5

## 2024-04-01 ASSESSMENT — SLIT LAMP EXAM - LIDS
COMMENTS: NORMAL
COMMENTS: NORMAL

## 2024-04-01 ASSESSMENT — EXTERNAL EXAM - LEFT EYE: OS_EXAM: NORMAL

## 2024-04-01 ASSESSMENT — EXTERNAL EXAM - RIGHT EYE: OD_EXAM: NORMAL

## 2024-04-01 ASSESSMENT — ENCOUNTER SYMPTOMS
EYES NEGATIVE: 1
CARDIOVASCULAR NEGATIVE: 1

## 2024-04-01 NOTE — PROGRESS NOTES
Assessment/Plan   Diagnoses and all orders for this visit:  Exudative age-related macular degeneration of both eyes with active choroidal neovascularization (CMS/HCC)  -     OCT, Retina - OU - Both Eyes        Active exudative age-related macular degeneration (AMD)  OU    Myopic CNV both eyes, active fluid OU    Consider Eyela both eyes (OU)  Treatment options for CNV OU  discussed, including observation, anti-VEGF injections (including Avastin, Lucentis,   Eylea  Vabysmo and  Beovu ), and  laser. Recommend anti-VEGF injections. Eylea done OU today in a sterile manner with Betadine 5% for antisepsis.      FU 1m

## 2024-04-01 NOTE — PROGRESS NOTES
INITIAL PICTURE YOUR PLATE ASSESSMENT  CARDIAC REHAB    SCORES:  Vegetables & Fruit (out of 12)                 6   Breads, Grains & Cereals (out of 12)        11  Red & Processed Meat (out of 12)         9  Poultry (out of 2)                                   2  Fish & Shellfish (out of 4)                      2  Beans, Nuts & Seeds (out of 4)             2  Milk & Dairy Foods (out of 6)              5  Toppings, Oils, Seasonings & Salt (out of 20)    18  Sweets, Snacks & Restaurant Food (out of 14)    10  Beverages (out of 10)          10     Overall Score (out of 96)    75     DIAGNOSIS  Inadequate intake of fruts and vegetables.    GOALS  Aim to consume at least 5 fruits and vegetables/d.   Fruit and Vegetable Tip Sheet provided.      Patient provided dietitian phone number for any further diet related questions.

## 2024-04-04 ENCOUNTER — OFFICE VISIT (OUTPATIENT)
Dept: CARDIAC SURGERY | Facility: CLINIC | Age: 79
End: 2024-04-04
Payer: MEDICARE

## 2024-04-04 ENCOUNTER — HOSPITAL ENCOUNTER (OUTPATIENT)
Dept: RADIOLOGY | Facility: HOSPITAL | Age: 79
Discharge: HOME | End: 2024-04-04
Payer: MEDICARE

## 2024-04-04 VITALS
HEART RATE: 57 BPM | BODY MASS INDEX: 29.41 KG/M2 | DIASTOLIC BLOOD PRESSURE: 90 MMHG | TEMPERATURE: 97.2 F | SYSTOLIC BLOOD PRESSURE: 162 MMHG | HEIGHT: 66 IN | OXYGEN SATURATION: 98 % | WEIGHT: 183 LBS

## 2024-04-04 DIAGNOSIS — Z95.1 S/P CABG X 3: ICD-10-CM

## 2024-04-04 DIAGNOSIS — Z95.1 S/P CORONARY ARTERY BYPASS GRAFT X 3: Primary | ICD-10-CM

## 2024-04-04 PROCEDURE — 3077F SYST BP >= 140 MM HG: CPT | Performed by: THORACIC SURGERY (CARDIOTHORACIC VASCULAR SURGERY)

## 2024-04-04 PROCEDURE — 1036F TOBACCO NON-USER: CPT | Performed by: THORACIC SURGERY (CARDIOTHORACIC VASCULAR SURGERY)

## 2024-04-04 PROCEDURE — 3080F DIAST BP >= 90 MM HG: CPT | Performed by: THORACIC SURGERY (CARDIOTHORACIC VASCULAR SURGERY)

## 2024-04-04 PROCEDURE — 99024 POSTOP FOLLOW-UP VISIT: CPT | Performed by: THORACIC SURGERY (CARDIOTHORACIC VASCULAR SURGERY)

## 2024-04-04 PROCEDURE — 1159F MED LIST DOCD IN RCRD: CPT | Performed by: THORACIC SURGERY (CARDIOTHORACIC VASCULAR SURGERY)

## 2024-04-04 PROCEDURE — 71046 X-RAY EXAM CHEST 2 VIEWS: CPT | Performed by: RADIOLOGY

## 2024-04-04 PROCEDURE — 71046 X-RAY EXAM CHEST 2 VIEWS: CPT

## 2024-04-04 PROCEDURE — 1157F ADVNC CARE PLAN IN RCRD: CPT | Performed by: THORACIC SURGERY (CARDIOTHORACIC VASCULAR SURGERY)

## 2024-04-04 RX ORDER — FUROSEMIDE 20 MG/1
20 TABLET ORAL DAILY
Qty: 7 TABLET | Refills: 0 | Status: SHIPPED | OUTPATIENT
Start: 2024-04-04 | End: 2025-04-04

## 2024-04-04 RX ORDER — POTASSIUM CHLORIDE 750 MG/1
10 TABLET, FILM COATED, EXTENDED RELEASE ORAL DAILY
Qty: 7 TABLET | Refills: 0 | Status: SHIPPED | OUTPATIENT
Start: 2024-04-04 | End: 2025-04-04

## 2024-04-04 NOTE — PROGRESS NOTES
Chief Complaint      HPI:   Mr. Sylvester Carvalho is a 78 y.o. male, who presents for post-operative evaluation.   He is now s/p 6 weeks of his CABGx3, and is recovering nicely.  He has resumed normal activities walking 2 miles a day and his appetite is returned to normal.  He has no chest pain, no shortness of breath and denies palpitations, dizziness, or syncope.     Past Medical History:   Diagnosis Date    Arthritis     BPH (benign prostatic hyperplasia)     Cataract     Coronary artery disease     COVID-19     VACCINATED    Diverticulosis     Exudative age-related macular degeneration, left eye, stage unspecified (CMS/[facility]) 01/31/2018    Age-related macular degeneration, wet, left eye    GERD (gastroesophageal reflux disease)     History of squamous cell carcinoma of skin     Hyperlipidemia     Hypertension     Personal history of other endocrine, nutritional and metabolic disease 01/24/2018    History of high cholesterol    Wears glasses        Past Surgical History:   Procedure Laterality Date    ARTERIAL BYPASS SURGERY      CARDIAC CATHETERIZATION N/A 02/02/2024    Procedure: Left Heart Cath, With LV;  Surgeon: Pedro Zelaya MD;  Location: [facility];  Service: Cardiovascular;  Laterality: N/A;  Ranexa 500 mg on admit    CARDIAC SURGERY  02/22/2024    COLONOSCOPY      TOTAL HIP ARTHROPLASTY Left 01/24/2018    Hip Replacement       Family History   Problem Relation Name Age of Onset    Other (stroke syndrome) Mother Erin     Stroke Mother Erin     Lung cancer Father      Coronary artery disease Brother      No Known Problems Other         Social History     Socioeconomic History    Marital status:      Spouse name: Not on file    Number of children: Not on file    Years of education: Not on file    Highest education level: Not on file   Occupational History    Not on file   Tobacco Use    Smoking status: Former     Packs/day: 1.00     Years: 36.00     Additional pack years: 0.00      "Total pack years: 36.00     Types: Cigarettes     Start date: 1964     Quit date: 2000     Years since quittin.2    Smokeless tobacco: Never    Tobacco comments:     I quit smoking many many times over the years so i didnt smoke continually for 36 years notes above   Vaping Use    Vaping Use: Never used   Substance and Sexual Activity    Alcohol use: Yes     Alcohol/week: 2.0 standard drinks of alcohol     Types: 2 Standard drinks or equivalent per week     Comment: 2x a week    Drug use: Never    Sexual activity: Not Currently     Partners: Female   Other Topics Concern    Not on file   Social History Narrative    Not on file     Social Determinants of Health     Financial Resource Strain: Low Risk  (2024)    Overall Financial Resource Strain (CARDIA)     Difficulty of Paying Living Expenses: Not hard at all   Food Insecurity: Not on file   Transportation Needs: No Transportation Needs (3/1/2024)    OASIS : Transportation     Lack of Transportation (Medical): No     Lack of Transportation (Non-Medical): No     Patient Unable or Declines to Respond: No   Physical Activity: Not on file   Stress: Not on file   Social Connections: Feeling Socially Integrated (3/1/2024)    OASIS : Social Isolation     Frequency of experiencing loneliness or isolation: Never   Intimate Partner Violence: Not on file   Housing Stability: Low Risk  (2024)    Housing Stability Vital Sign     Unable to Pay for Housing in the Last Year: No     Number of Places Lived in the Last Year: 1     Unstable Housing in the Last Year: No       Allergies   Allergen Reactions    Penicillins Swelling     \"Whole body swelled up\"       Outpatient Encounter Medications as of 2024   Medication Sig Dispense Refill    amiodarone (Pacerone) 200 mg tablet Take 1 tablet (200 mg) by mouth 2 times a day. Take 2 tablets by mouth twice daily starting with evening dose 24. Decrease to 1 table by mouth once daily on 3/6/2024. 90 " tablet 3    apixaban (Eliquis) 5 mg tablet Take 1 tablet (5 mg) by mouth every 12 hours. 60 tablet 0    aspirin 81 mg chewable tablet Chew 1 tablet (81 mg) once daily. 30 tablet 11    atorvastatin (Lipitor) 40 mg tablet Take 1 tablet (40 mg) by mouth once daily. 90 tablet 3    cholecalciferol (Vitamin D-3) 50 MCG (2000 UT) tablet Take 1 tablet (2,000 Units) by mouth once daily.      iron polysaccharides (Nu-Iron,Niferex) 150 mg iron capsule Take 1 capsule (150 mg) by mouth once daily. Do not start before February 29, 2024. 30 capsule 0    magnesium oxide (Mag-Ox) 400 mg (241.3 mg magnesium) tablet Take 1 tablet (400 mg) by mouth once daily. Do not start before February 29, 2024. 30 tablet 0    metoprolol tartrate (Lopressor) 25 mg tablet Take 1 tablet (25 mg) by mouth 2 times a day. 60 tablet 0    mv-min/FA/vit K/lutein/zeaxant (PRESERVISION AREDS 2 PLUS MV ORAL) Take by mouth.      nitroglycerin (Nitrostat) 0.4 mg SL tablet Place 1 tablet (0.4 mg) under the tongue every 5 minutes if needed for chest pain.      pantoprazole (ProtoNix) 40 mg EC tablet Take 1 tablet (40 mg) by mouth once daily in the morning. Take before meals. Do not crush, chew, or split. 90 tablet 1     No facility-administered encounter medications on file as of 4/4/2024.       Physical Exam    Sternum: stable  Wounds: OK  Lower limb: minimal edema  Xray small bilateral effusion      Assessment and Plan:    Mr. Sylvester Carvalho is a 78 y.o. male, who is recovering well after surgery.      Referral in place for cardiac rehab  Ok to drive, ok to return to normal activities but refrain from heavy lifting for 3 months postop  Continue to follow up with cardiology   Will prescribe a small dose of Lasix and follow-up if needed.      Amarilys Chung MD  04/04/24  1:40 PM6

## 2024-04-05 ENCOUNTER — PATIENT OUTREACH (OUTPATIENT)
Dept: CARDIOLOGY | Facility: CLINIC | Age: 79
End: 2024-04-05
Payer: MEDICARE

## 2024-04-05 NOTE — PROGRESS NOTES
Call placed regarding one month post discharge follow up call.  At time of outreach call the patient feels as if their condition has improved since initial visit with PCP or specialist.  Questions or concerns regarding recovery period addressed at this time.   Reviewed any PCP or specialists progress notes/labs/radiology reports if applicable and addressed any questions or concerns.     Patient is active and states he is doing well. He will be starting his cardiac rehab soon.

## 2024-04-09 ENCOUNTER — APPOINTMENT (OUTPATIENT)
Dept: CARDIOLOGY | Facility: HOSPITAL | Age: 79
End: 2024-04-09
Payer: MEDICARE

## 2024-04-09 ENCOUNTER — HOSPITAL ENCOUNTER (EMERGENCY)
Facility: HOSPITAL | Age: 79
Discharge: HOME | End: 2024-04-09
Payer: MEDICARE

## 2024-04-09 VITALS
TEMPERATURE: 97.9 F | HEIGHT: 66 IN | BODY MASS INDEX: 28.61 KG/M2 | DIASTOLIC BLOOD PRESSURE: 89 MMHG | WEIGHT: 178 LBS | SYSTOLIC BLOOD PRESSURE: 177 MMHG | HEART RATE: 50 BPM | OXYGEN SATURATION: 94 % | RESPIRATION RATE: 18 BRPM

## 2024-04-09 DIAGNOSIS — R03.0 ELEVATED BLOOD PRESSURE READING: Primary | ICD-10-CM

## 2024-04-09 LAB
ANION GAP SERPL CALC-SCNC: 12 MMOL/L (ref 10–20)
BASOPHILS # BLD AUTO: 0.05 X10*3/UL (ref 0–0.1)
BASOPHILS NFR BLD AUTO: 0.8 %
BUN SERPL-MCNC: 17 MG/DL (ref 6–23)
CALCIUM SERPL-MCNC: 9.3 MG/DL (ref 8.6–10.3)
CHLORIDE SERPL-SCNC: 108 MMOL/L (ref 98–107)
CO2 SERPL-SCNC: 26 MMOL/L (ref 21–32)
CREAT SERPL-MCNC: 0.99 MG/DL (ref 0.5–1.3)
EGFRCR SERPLBLD CKD-EPI 2021: 77 ML/MIN/1.73M*2
EOSINOPHIL # BLD AUTO: 0.07 X10*3/UL (ref 0–0.4)
EOSINOPHIL NFR BLD AUTO: 1.1 %
ERYTHROCYTE [DISTWIDTH] IN BLOOD BY AUTOMATED COUNT: 13.4 % (ref 11.5–14.5)
GLUCOSE SERPL-MCNC: 145 MG/DL (ref 74–99)
HCT VFR BLD AUTO: 43.7 % (ref 41–52)
HGB BLD-MCNC: 13.7 G/DL (ref 13.5–17.5)
HOLD SPECIMEN: NORMAL
HOLD SPECIMEN: NORMAL
IMM GRANULOCYTES # BLD AUTO: 0.03 X10*3/UL (ref 0–0.5)
IMM GRANULOCYTES NFR BLD AUTO: 0.5 % (ref 0–0.9)
LYMPHOCYTES # BLD AUTO: 0.81 X10*3/UL (ref 0.8–3)
LYMPHOCYTES NFR BLD AUTO: 13.1 %
MCH RBC QN AUTO: 30.4 PG (ref 26–34)
MCHC RBC AUTO-ENTMCNC: 31.4 G/DL (ref 32–36)
MCV RBC AUTO: 97 FL (ref 80–100)
MONOCYTES # BLD AUTO: 0.46 X10*3/UL (ref 0.05–0.8)
MONOCYTES NFR BLD AUTO: 7.4 %
NEUTROPHILS # BLD AUTO: 4.76 X10*3/UL (ref 1.6–5.5)
NEUTROPHILS NFR BLD AUTO: 77.1 %
NRBC BLD-RTO: 0 /100 WBCS (ref 0–0)
PLATELET # BLD AUTO: 235 X10*3/UL (ref 150–450)
POTASSIUM SERPL-SCNC: 3.9 MMOL/L (ref 3.5–5.3)
RBC # BLD AUTO: 4.5 X10*6/UL (ref 4.5–5.9)
SODIUM SERPL-SCNC: 142 MMOL/L (ref 136–145)
WBC # BLD AUTO: 6.2 X10*3/UL (ref 4.4–11.3)

## 2024-04-09 PROCEDURE — 93005 ELECTROCARDIOGRAM TRACING: CPT

## 2024-04-09 PROCEDURE — 85025 COMPLETE CBC W/AUTO DIFF WBC: CPT | Performed by: PHYSICIAN ASSISTANT

## 2024-04-09 PROCEDURE — 99283 EMERGENCY DEPT VISIT LOW MDM: CPT | Mod: 25 | Performed by: PHYSICIAN ASSISTANT

## 2024-04-09 PROCEDURE — 99284 EMERGENCY DEPT VISIT MOD MDM: CPT | Mod: 25 | Performed by: PHYSICIAN ASSISTANT

## 2024-04-09 PROCEDURE — 36415 COLL VENOUS BLD VENIPUNCTURE: CPT | Performed by: PHYSICIAN ASSISTANT

## 2024-04-09 PROCEDURE — 82374 ASSAY BLOOD CARBON DIOXIDE: CPT | Performed by: PHYSICIAN ASSISTANT

## 2024-04-09 PROCEDURE — 2500000001 HC RX 250 WO HCPCS SELF ADMINISTERED DRUGS (ALT 637 FOR MEDICARE OP): Performed by: PHYSICIAN ASSISTANT

## 2024-04-09 RX ORDER — METOPROLOL TARTRATE 50 MG/1
25 TABLET ORAL 2 TIMES DAILY
Qty: 30 TABLET | Refills: 0 | Status: SHIPPED | OUTPATIENT
Start: 2024-04-09 | End: 2024-04-25 | Stop reason: ALTCHOICE

## 2024-04-09 RX ORDER — AMLODIPINE BESYLATE 5 MG/1
2.5 TABLET ORAL ONCE
Status: COMPLETED | OUTPATIENT
Start: 2024-04-09 | End: 2024-04-09

## 2024-04-09 RX ORDER — AMLODIPINE BESYLATE 2.5 MG/1
2.5 TABLET ORAL DAILY
Qty: 30 TABLET | Refills: 0 | Status: SHIPPED | OUTPATIENT
Start: 2024-04-09 | End: 2024-04-18 | Stop reason: SDUPTHER

## 2024-04-09 RX ORDER — BENAZEPRIL HYDROCHLORIDE AND HYDROCHLOROTHIAZIDE 20; 25 MG/1; MG/1
1 TABLET ORAL DAILY
Qty: 30 TABLET | Refills: 0 | Status: SHIPPED | OUTPATIENT
Start: 2024-04-09 | End: 2024-04-25 | Stop reason: SDUPTHER

## 2024-04-09 RX ADMIN — AMLODIPINE BESYLATE 2.5 MG: 5 TABLET ORAL at 10:26

## 2024-04-09 ASSESSMENT — LIFESTYLE VARIABLES
EVER FELT BAD OR GUILTY ABOUT YOUR DRINKING: NO
TOTAL SCORE: 0
HAVE PEOPLE ANNOYED YOU BY CRITICIZING YOUR DRINKING: NO
HAVE YOU EVER FELT YOU SHOULD CUT DOWN ON YOUR DRINKING: NO
EVER HAD A DRINK FIRST THING IN THE MORNING TO STEADY YOUR NERVES TO GET RID OF A HANGOVER: NO

## 2024-04-09 ASSESSMENT — PAIN SCALES - GENERAL
PAINLEVEL_OUTOF10: 0 - NO PAIN

## 2024-04-09 ASSESSMENT — PAIN - FUNCTIONAL ASSESSMENT: PAIN_FUNCTIONAL_ASSESSMENT: 0-10

## 2024-04-09 NOTE — ED PROVIDER NOTES
HPI   Chief Complaint   Patient presents with    Hypertension       This is a 78-year-old male with PMH CABG x 3 6 weeks ago presenting for evaluation of HTN.  5 days ago at a follow-up appointment he noted his blood pressure was in the 180s systolic.  They have been watching it closely at home and last night and this morning was over 200s.  Has been compliant with his home blood pressure medications.  Denies any headache, blurred vision, diplopia, vision loss, chest pain, shortness of breath, vomiting, back pain, abdominal pain or any other symptoms.      History provided by:  Patient   used: No                        Xiang Coma Scale Score: 15                     Patient History   Past Medical History:   Diagnosis Date    Arthritis     BPH (benign prostatic hyperplasia)     Cataract     Coronary artery disease     COVID-19     VACCINATED    Diverticulosis     Exudative age-related macular degeneration, left eye, stage unspecified (CMS/[facility]) 01/31/2018    Age-related macular degeneration, wet, left eye    GERD (gastroesophageal reflux disease)     History of squamous cell carcinoma of skin     Hyperlipidemia     Hypertension     Personal history of other endocrine, nutritional and metabolic disease 01/24/2018    History of high cholesterol    Wears glasses      Past Surgical History:   Procedure Laterality Date    ARTERIAL BYPASS SURGERY      CARDIAC CATHETERIZATION N/A 02/02/2024    Procedure: Left Heart Cath, With LV;  Surgeon: Pedro Zelaya MD;  Location: [facility];  Service: Cardiovascular;  Laterality: N/A;  Ranexa 500 mg on admit    CARDIAC SURGERY  02/22/2024    COLONOSCOPY      TOTAL HIP ARTHROPLASTY Left 01/24/2018    Hip Replacement     Family History   Problem Relation Name Age of Onset    Other (stroke syndrome) Mother Scooba     Stroke Mother Scooba     Lung cancer Father      Coronary artery disease Brother      No Known Problems Other       Social History      Tobacco Use    Smoking status: Former     Packs/day: 1.00     Years: 36.00     Additional pack years: 0.00     Total pack years: 36.00     Types: Cigarettes     Start date: 1964     Quit date: 2000     Years since quittin.2    Smokeless tobacco: Never    Tobacco comments:     I quit smoking many many times over the years so i didnt smoke continually for 36 years notes above   Vaping Use    Vaping Use: Never used   Substance Use Topics    Alcohol use: Yes     Alcohol/week: 2.0 standard drinks of alcohol     Types: 2 Standard drinks or equivalent per week     Comment: 2x a week    Drug use: Never       Physical Exam   ED Triage Vitals [24 0907]   Temperature Heart Rate Respirations BP   36.6 °C (97.9 °F) 57 18 (!) 195/94      Pulse Ox Temp Source Heart Rate Source Patient Position   95 % Temporal -- Sitting      BP Location FiO2 (%)     Left arm --       Physical Exam  Constitutional:       Appearance: Normal appearance.   HENT:      Mouth/Throat:      Mouth: Mucous membranes are moist.      Pharynx: Oropharynx is clear.   Cardiovascular:      Rate and Rhythm: Normal rate and regular rhythm.      Pulses: Normal pulses.      Heart sounds: Normal heart sounds.   Pulmonary:      Effort: Pulmonary effort is normal.      Breath sounds: Normal breath sounds.   Abdominal:      General: There is no distension.      Palpations: Abdomen is soft.      Tenderness: There is no abdominal tenderness. There is no guarding.   Musculoskeletal:      Cervical back: Normal range of motion and neck supple.   Skin:     General: Skin is warm and dry.   Neurological:      General: No focal deficit present.      Mental Status: He is alert and oriented to person, place, and time.         ED Course & MDM   Diagnoses as of 24 1151   Elevated blood pressure reading       Medical Decision Making  DDx: Hypertensive urgency/emergency, endorgan damage     Patient has no symptoms.  Visibly nontoxic-appearing no apparent  distress.  Systolic over 200s. I had a discussion with the patient's cardiologist Dr. Lin who recommended checking basic labs and restarting patient on amlodipine and Lotensin HCTZ.  Patient also requested refills of his metoprolol.  His basic labs are unremarkable.  EKG shows no acute ischemic changes or dysrhythmia. Instructed to return to the nearest ED if any concerns or new or worsening symptoms. Patient verbalized understanding and agreement with plan. Discharged in stable condition.      Disclaimer: This note was dictated using speech recognition software. An attempt at proofreading was made to minimize errors. Minor errors in transcription may be present. Please call if questions.    Amount and/or Complexity of Data Reviewed  Labs: ordered.  ECG/medicine tests: ordered and independent interpretation performed.     Details: EKG interpreted by me: Sinus bradycardia.  First-degree AV block.  MT interval 232 ms.  Rate 53.  Normal axis.  Anterolateral and inferior T wave inversions.  No STEMI.  QTc 444 ms.        Procedure  Procedures     Elroy Mcwilliams PA-C  04/09/24 1157

## 2024-04-10 ENCOUNTER — CLINICAL SUPPORT (OUTPATIENT)
Dept: CARDIAC REHAB | Facility: CLINIC | Age: 79
End: 2024-04-10
Payer: MEDICARE

## 2024-04-10 DIAGNOSIS — Z95.1 S/P CABG X 3: ICD-10-CM

## 2024-04-10 LAB
ATRIAL RATE: 53 BPM
P AXIS: 45 DEGREES
P OFFSET: 169 MS
P ONSET: 107 MS
PR INTERVAL: 232 MS
Q ONSET: 223 MS
QRS COUNT: 9 BEATS
QRS DURATION: 76 MS
QT INTERVAL: 474 MS
QTC CALCULATION(BAZETT): 444 MS
QTC FREDERICIA: 455 MS
R AXIS: 33 DEGREES
T AXIS: 180 DEGREES
T OFFSET: 460 MS
VENTRICULAR RATE: 53 BPM

## 2024-04-10 PROCEDURE — 93798 PHYS/QHP OP CAR RHAB W/ECG: CPT

## 2024-04-11 DIAGNOSIS — I48.0 PAROXYSMAL ATRIAL FIBRILLATION (MULTI): ICD-10-CM

## 2024-04-11 RX ORDER — AMIODARONE HYDROCHLORIDE 200 MG/1
TABLET ORAL
Qty: 90 TABLET | Refills: 3 | Status: SHIPPED | OUTPATIENT
Start: 2024-04-11 | End: 2024-04-25 | Stop reason: ALTCHOICE

## 2024-04-11 NOTE — TELEPHONE ENCOUNTER
Received request for prescription refills for patient.   Patient follows with Dr. Arnoldo Lin DO     Request is for clarification on his Pacerone 200 mg dose  Is patient currently on medication yes    Last OV 3/26/24  Next OV 4/18/24    Pended for signing and sent to provider with clarification

## 2024-04-12 ENCOUNTER — CLINICAL SUPPORT (OUTPATIENT)
Dept: CARDIAC REHAB | Facility: CLINIC | Age: 79
End: 2024-04-12
Payer: MEDICARE

## 2024-04-12 DIAGNOSIS — Z95.1 S/P CABG X 3: ICD-10-CM

## 2024-04-12 PROCEDURE — 93798 PHYS/QHP OP CAR RHAB W/ECG: CPT

## 2024-04-15 ENCOUNTER — CLINICAL SUPPORT (OUTPATIENT)
Dept: CARDIAC REHAB | Facility: CLINIC | Age: 79
End: 2024-04-15
Payer: MEDICARE

## 2024-04-15 DIAGNOSIS — Z95.1 S/P CABG X 3: ICD-10-CM

## 2024-04-15 PROCEDURE — 93798 PHYS/QHP OP CAR RHAB W/ECG: CPT

## 2024-04-16 ASSESSMENT — ACTIVITIES OF DAILY LIVING (ADL)
HOME_HEALTH_OASIS: 01
HOME_HEALTH_OASIS: 01
OASIS_M1830: 01
OASIS_M1830: 03

## 2024-04-17 ENCOUNTER — CLINICAL SUPPORT (OUTPATIENT)
Dept: CARDIAC REHAB | Facility: CLINIC | Age: 79
End: 2024-04-17
Payer: MEDICARE

## 2024-04-17 DIAGNOSIS — I25.10 CORONARY ARTERY DISEASE INVOLVING NATIVE CORONARY ARTERY OF NATIVE HEART, UNSPECIFIED WHETHER ANGINA PRESENT: ICD-10-CM

## 2024-04-17 DIAGNOSIS — I48.0 PAROXYSMAL ATRIAL FIBRILLATION (MULTI): ICD-10-CM

## 2024-04-17 DIAGNOSIS — Z95.1 S/P CABG X 3: ICD-10-CM

## 2024-04-17 PROCEDURE — 93798 PHYS/QHP OP CAR RHAB W/ECG: CPT

## 2024-04-17 PROCEDURE — G0180 MD CERTIFICATION HHA PATIENT: HCPCS | Performed by: FAMILY MEDICINE

## 2024-04-18 ENCOUNTER — OFFICE VISIT (OUTPATIENT)
Dept: CARDIOLOGY | Facility: CLINIC | Age: 79
End: 2024-04-18
Payer: MEDICARE

## 2024-04-18 ENCOUNTER — LAB (OUTPATIENT)
Dept: LAB | Facility: LAB | Age: 79
End: 2024-04-18
Payer: MEDICARE

## 2024-04-18 VITALS
BODY MASS INDEX: 27.1 KG/M2 | SYSTOLIC BLOOD PRESSURE: 126 MMHG | HEART RATE: 56 BPM | HEIGHT: 68 IN | WEIGHT: 178.8 LBS | DIASTOLIC BLOOD PRESSURE: 88 MMHG

## 2024-04-18 DIAGNOSIS — E78.2 MIXED HYPERLIPIDEMIA: ICD-10-CM

## 2024-04-18 DIAGNOSIS — I10 BENIGN HYPERTENSION: Primary | ICD-10-CM

## 2024-04-18 DIAGNOSIS — I10 BENIGN HYPERTENSION: ICD-10-CM

## 2024-04-18 DIAGNOSIS — R03.0 ELEVATED BLOOD PRESSURE READING: ICD-10-CM

## 2024-04-18 DIAGNOSIS — Z95.1 S/P CORONARY ARTERY BYPASS GRAFT X 3: ICD-10-CM

## 2024-04-18 DIAGNOSIS — I25.10 CORONARY ARTERY DISEASE INVOLVING NATIVE CORONARY ARTERY OF NATIVE HEART, UNSPECIFIED WHETHER ANGINA PRESENT: ICD-10-CM

## 2024-04-18 LAB
ANION GAP SERPL CALC-SCNC: 12 MMOL/L (ref 10–20)
BUN SERPL-MCNC: 16 MG/DL (ref 6–23)
CALCIUM SERPL-MCNC: 9.5 MG/DL (ref 8.6–10.3)
CHLORIDE SERPL-SCNC: 103 MMOL/L (ref 98–107)
CO2 SERPL-SCNC: 30 MMOL/L (ref 21–32)
CREAT SERPL-MCNC: 1.02 MG/DL (ref 0.5–1.3)
EGFRCR SERPLBLD CKD-EPI 2021: 75 ML/MIN/1.73M*2
GLUCOSE SERPL-MCNC: 93 MG/DL (ref 74–99)
POTASSIUM SERPL-SCNC: 3.9 MMOL/L (ref 3.5–5.3)
SODIUM SERPL-SCNC: 141 MMOL/L (ref 136–145)

## 2024-04-18 PROCEDURE — 1036F TOBACCO NON-USER: CPT | Performed by: NURSE PRACTITIONER

## 2024-04-18 PROCEDURE — 36415 COLL VENOUS BLD VENIPUNCTURE: CPT

## 2024-04-18 PROCEDURE — 3079F DIAST BP 80-89 MM HG: CPT | Performed by: NURSE PRACTITIONER

## 2024-04-18 PROCEDURE — 3074F SYST BP LT 130 MM HG: CPT | Performed by: NURSE PRACTITIONER

## 2024-04-18 PROCEDURE — 1157F ADVNC CARE PLAN IN RCRD: CPT | Performed by: NURSE PRACTITIONER

## 2024-04-18 PROCEDURE — 1159F MED LIST DOCD IN RCRD: CPT | Performed by: NURSE PRACTITIONER

## 2024-04-18 PROCEDURE — 99495 TRANSJ CARE MGMT MOD F2F 14D: CPT | Performed by: NURSE PRACTITIONER

## 2024-04-18 PROCEDURE — 80048 BASIC METABOLIC PNL TOTAL CA: CPT

## 2024-04-18 RX ORDER — AMLODIPINE BESYLATE 2.5 MG/1
2.5 TABLET ORAL 2 TIMES DAILY
Qty: 30 TABLET | Refills: 0 | Status: SHIPPED | OUTPATIENT
Start: 2024-04-18 | End: 2024-04-25 | Stop reason: SDUPTHER

## 2024-04-18 ASSESSMENT — ENCOUNTER SYMPTOMS
HEMOPTYSIS: 0
SUSPICIOUS LESIONS: 0
POOR WOUND HEALING: 0
SYNCOPE: 0
NUMBNESS: 0
CLAUDICATION: 0
SPUTUM PRODUCTION: 0
SHORTNESS OF BREATH: 0
PSYCHIATRIC NEGATIVE: 1
DIZZINESS: 0
FEVER: 0
DIARRHEA: 0
EYES NEGATIVE: 1
ABDOMINAL PAIN: 0
COUGH: 0
ENDOCRINE NEGATIVE: 1
HEMATOLOGIC/LYMPHATIC NEGATIVE: 1
VOMITING: 0
WHEEZING: 0
COLOR CHANGE: 0
CHILLS: 0
ORTHOPNEA: 0
NEUROLOGICAL NEGATIVE: 1
PND: 0
NAUSEA: 0
NEAR-SYNCOPE: 0
VISUAL HALOS: 0
MUSCULOSKELETAL NEGATIVE: 1
CONSTIPATION: 0
IRREGULAR HEARTBEAT: 0
ALLERGIC/IMMUNOLOGIC NEGATIVE: 1
PALPITATIONS: 0
DECREASED APPETITE: 0
LIGHT-HEADEDNESS: 0
UNUSUAL HAIR DISTRIBUTION: 0
NAIL CHANGES: 0
DYSPNEA ON EXERTION: 0
DIAPHORESIS: 0
CONSTITUTIONAL NEGATIVE: 1

## 2024-04-18 NOTE — PROGRESS NOTES
CARDIOLOGY OFFICE VISIT      CHIEF COMPLAINT  Chief Complaint   Patient presents with    Follow-up     Pt is following up 1 week after his discharge        HISTORY OF PRESENT ILLNESS  78-year-old male came in as a follow-up back in February he had CABG x 3 vessels LIMA to the LAD, saphenous vein graft to to the diagonal, saphenous vein graft to the PL.  He states that last week he came to the emergency department he had slightly elevated blood pressure.  His blood pressure is very good today I spoke to him at length though he states that when he gets up in the morning his systolic blood pressures about 160 that we are going to increase his amlodipine to twice daily.  He denies fever, chills, nausea, vomiting, PND, orthopnea, claudication      Past Medical History  Past Medical History:   Diagnosis Date    Arthritis     BPH (benign prostatic hyperplasia)     Cataract     Coronary artery disease     COVID-19     VACCINATED    Diverticulosis     Exudative age-related macular degeneration, left eye, stage unspecified (Multi) 2018    Age-related macular degeneration, wet, left eye    GERD (gastroesophageal reflux disease)     History of squamous cell carcinoma of skin     Hyperlipidemia     Hypertension     Personal history of other endocrine, nutritional and metabolic disease 2018    History of high cholesterol    Wears glasses        Social History  Social History     Tobacco Use    Smoking status: Former     Current packs/day: 0.00     Average packs/day: 1 pack/day for 36.0 years (36.0 ttl pk-yrs)     Types: Cigarettes     Start date: 1964     Quit date: 2000     Years since quittin.3    Smokeless tobacco: Never    Tobacco comments:     I quit smoking many many times over the years so i didnt smoke continually for 36 years notes above   Vaping Use    Vaping status: Never Used   Substance Use Topics    Alcohol use: Yes     Alcohol/week: 2.0 standard drinks of alcohol     Types: 2 Standard  "drinks or equivalent per week     Comment: 2x a week    Drug use: Never       Family History     Family History   Problem Relation Name Age of Onset    Other (stroke syndrome) Mother Marcia     Stroke Mother Marcia     Lung cancer Father      Coronary artery disease Brother      No Known Problems Other          Allergies:  Allergies   Allergen Reactions    Penicillins Swelling     \"Whole body swelled up\"        Outpatient Medications:  Current Outpatient Medications   Medication Instructions    amiodarone (Pacerone) 200 mg tablet Decreased dose starting 3/6/24 to 1 tablet by mouth once daily    amLODIPine (NORVASC) 2.5 mg, oral, Daily    apixaban (ELIQUIS) 5 mg, oral, Every 12 hours    aspirin 81 mg, oral, Daily    atorvastatin (LIPITOR) 40 mg, oral, Daily    benazepriL-hydrochlorothiazide (Lotensin HCT) 20-25 mg tablet 1 tablet, oral, Daily    cholecalciferol (Vitamin D-3) 50 MCG (2000 UT) tablet 1 tablet, oral, Daily    furosemide (LASIX) 20 mg, oral, Daily    iron polysaccharides (NU-IRON,NIFEREX) 150 mg, oral, Daily    magnesium oxide (MAG-OX) 400 mg, oral, Daily    metoprolol tartrate (LOPRESSOR) 25 mg, oral, 2 times daily    metoprolol tartrate (LOPRESSOR) 25 mg, oral, 2 times daily    mv-min/FA/vit K/lutein/zeaxant (PRESERVISION AREDS 2 PLUS MV ORAL) oral    nitroglycerin (NITROSTAT) 0.4 mg, sublingual, Every 5 min PRN    pantoprazole (PROTONIX) 40 mg, oral, Daily before breakfast, Do not crush, chew, or split.    potassium chloride CR (Klor-Con) 10 mEq ER tablet 10 mEq, oral, Daily, Do not crush, chew, or split.          REVIEW OF SYSTEMS  Review of Systems   Constitutional: Negative. Negative for chills, decreased appetite, diaphoresis, fever and malaise/fatigue.   HENT: Negative.     Eyes: Negative.  Negative for visual disturbance and visual halos.   Cardiovascular:  Negative for chest pain, claudication, cyanosis, dyspnea on exertion, irregular heartbeat, leg swelling, near-syncope, orthopnea, " palpitations, paroxysmal nocturnal dyspnea and syncope.   Respiratory:  Negative for cough, hemoptysis, shortness of breath, sputum production and wheezing.    Endocrine: Negative.    Hematologic/Lymphatic: Negative.    Skin:  Negative for color change, dry skin, flushing, itching, nail changes, poor wound healing, rash, skin cancer, suspicious lesions and unusual hair distribution.   Musculoskeletal: Negative.    Gastrointestinal:  Negative for abdominal pain, constipation, diarrhea, nausea and vomiting.   Genitourinary: Negative.    Neurological: Negative.  Negative for dizziness, light-headedness and numbness.   Psychiatric/Behavioral: Negative.     Allergic/Immunologic: Negative.          VITALS  Vitals:    04/18/24 1320   BP: 126/88   Pulse: 56       PHYSICAL EXAM  Constitutional:       Appearance: Not in distress.   Eyes:      Pupils: Pupils are equal, round, and reactive to light.   HENT:      Head: Normocephalic.   Neck:      Thyroid: No thyroid mass.      Vascular: JVD normal.   Pulmonary:      Effort: Pulmonary effort is normal. No tachypnea or bradypnea.      Breath sounds: Normal breath sounds.   Chest:      Chest wall: Not tender to palpatation.   Cardiovascular:      PMI at left midclavicular line. Normal rate. Regular rhythm. Normal S1. Normal S2.       Murmurs: There is no murmur.      No gallop.  No click. No rub.   Pulses:     Intact distal pulses.   Abdominal:      General: Bowel sounds are normal. There is no distension.      Palpations: Abdomen is soft.   Skin:     General: Skin is warm and dry.   Neurological:      Mental Status: Alert.   Psychiatric:         Behavior: Behavior is cooperative.           ASSESSMENT AND PLAN  Diagnoses and all orders for this visit:  Benign hypertension  -     Basic metabolic panel; Future  Mixed hyperlipidemia  Coronary artery disease involving native coronary artery of native heart, unspecified whether angina present  S/P coronary artery bypass graft x  3  Elevated blood pressure reading  -     amLODIPine (Norvasc) 2.5 mg tablet; Take 1 tablet (2.5 mg) by mouth 2 times a day.    Plan  -Check a BMP today  -Increase amlodipine 2.5 mg p.o. twice daily  -Follow-up with Dr. Lin as scheduled  -Will continue the medications as prescribed      Brooks Ho CNP   [facility]      [unfilled]    **Disclaimer: This note was dictated by speech recognition, and every effort has been made to prevent any error in transcription, however minor errors may be present**

## 2024-04-18 NOTE — PATIENT INSTRUCTIONS
Follow-up with Dr. Lin today as scheduled  Check a BMP today  Increase amlodipine 2.5 mg to twice daily

## 2024-04-19 ENCOUNTER — CLINICAL SUPPORT (OUTPATIENT)
Dept: CARDIAC REHAB | Facility: CLINIC | Age: 79
End: 2024-04-19
Payer: MEDICARE

## 2024-04-19 DIAGNOSIS — Z95.1 S/P CABG X 3: ICD-10-CM

## 2024-04-19 PROCEDURE — 93798 PHYS/QHP OP CAR RHAB W/ECG: CPT

## 2024-04-19 NOTE — TELEPHONE ENCOUNTER
Received request for prescription refills for patient.   Patient follows with Dr. Delia MD     Request is for Metoprolol  Is patient currently on medication, YES    Last OV 3/26/2024  Next OV 4/25/2024    Pended for signing and sent to provider

## 2024-04-22 ENCOUNTER — CLINICAL SUPPORT (OUTPATIENT)
Dept: CARDIAC REHAB | Facility: CLINIC | Age: 79
End: 2024-04-22
Payer: MEDICARE

## 2024-04-22 DIAGNOSIS — Z95.1 S/P CABG X 3: ICD-10-CM

## 2024-04-22 PROCEDURE — 93798 PHYS/QHP OP CAR RHAB W/ECG: CPT

## 2024-04-22 RX ORDER — METOPROLOL TARTRATE 25 MG/1
25 TABLET, FILM COATED ORAL 2 TIMES DAILY
Qty: 180 TABLET | Refills: 3 | Status: SHIPPED | OUTPATIENT
Start: 2024-04-22 | End: 2024-04-25 | Stop reason: ALTCHOICE

## 2024-04-24 ENCOUNTER — CLINICAL SUPPORT (OUTPATIENT)
Dept: CARDIAC REHAB | Facility: CLINIC | Age: 79
End: 2024-04-24
Payer: MEDICARE

## 2024-04-24 DIAGNOSIS — Z95.1 S/P CABG X 3: ICD-10-CM

## 2024-04-24 PROCEDURE — 93798 PHYS/QHP OP CAR RHAB W/ECG: CPT

## 2024-04-25 ENCOUNTER — OFFICE VISIT (OUTPATIENT)
Dept: CARDIOLOGY | Facility: CLINIC | Age: 79
End: 2024-04-25
Payer: MEDICARE

## 2024-04-25 VITALS
SYSTOLIC BLOOD PRESSURE: 138 MMHG | WEIGHT: 181 LBS | HEART RATE: 59 BPM | BODY MASS INDEX: 29.09 KG/M2 | DIASTOLIC BLOOD PRESSURE: 80 MMHG | HEIGHT: 66 IN

## 2024-04-25 DIAGNOSIS — Z87.891 FORMER CIGARETTE SMOKER: ICD-10-CM

## 2024-04-25 DIAGNOSIS — R93.1 ELEVATED CORONARY ARTERY CALCIUM SCORE: ICD-10-CM

## 2024-04-25 DIAGNOSIS — I48.0 PAROXYSMAL ATRIAL FIBRILLATION (MULTI): ICD-10-CM

## 2024-04-25 DIAGNOSIS — Z82.49 FAMILY HISTORY OF ISCHEMIC HEART DISEASE: ICD-10-CM

## 2024-04-25 DIAGNOSIS — Z95.1 S/P CORONARY ARTERY BYPASS GRAFT X 3: ICD-10-CM

## 2024-04-25 DIAGNOSIS — I10 BENIGN HYPERTENSION: ICD-10-CM

## 2024-04-25 DIAGNOSIS — I25.10 CORONARY ARTERY DISEASE INVOLVING NATIVE CORONARY ARTERY OF NATIVE HEART, UNSPECIFIED WHETHER ANGINA PRESENT: ICD-10-CM

## 2024-04-25 DIAGNOSIS — E78.2 MIXED HYPERLIPIDEMIA: ICD-10-CM

## 2024-04-25 DIAGNOSIS — R03.0 ELEVATED BLOOD PRESSURE READING: ICD-10-CM

## 2024-04-25 PROCEDURE — 3079F DIAST BP 80-89 MM HG: CPT | Performed by: INTERNAL MEDICINE

## 2024-04-25 PROCEDURE — 1036F TOBACCO NON-USER: CPT | Performed by: INTERNAL MEDICINE

## 2024-04-25 PROCEDURE — 99214 OFFICE O/P EST MOD 30 MIN: CPT | Performed by: INTERNAL MEDICINE

## 2024-04-25 PROCEDURE — 3075F SYST BP GE 130 - 139MM HG: CPT | Performed by: INTERNAL MEDICINE

## 2024-04-25 PROCEDURE — 1159F MED LIST DOCD IN RCRD: CPT | Performed by: INTERNAL MEDICINE

## 2024-04-25 PROCEDURE — 1157F ADVNC CARE PLAN IN RCRD: CPT | Performed by: INTERNAL MEDICINE

## 2024-04-25 RX ORDER — AMLODIPINE BESYLATE 5 MG/1
5 TABLET ORAL DAILY
Qty: 90 TABLET | Refills: 3 | Status: SHIPPED | OUTPATIENT
Start: 2024-04-25 | End: 2025-04-25

## 2024-04-25 RX ORDER — METOPROLOL TARTRATE 25 MG/1
25 TABLET, FILM COATED ORAL 2 TIMES DAILY
Qty: 180 TABLET | Refills: 3 | Status: SHIPPED | OUTPATIENT
Start: 2024-04-25 | End: 2024-05-13 | Stop reason: SDUPTHER

## 2024-04-25 RX ORDER — BENAZEPRIL HYDROCHLORIDE AND HYDROCHLOROTHIAZIDE 20; 25 MG/1; MG/1
1 TABLET ORAL DAILY
Qty: 90 TABLET | Refills: 3 | Status: SHIPPED | OUTPATIENT
Start: 2024-04-25 | End: 2025-04-25

## 2024-04-25 NOTE — PROGRESS NOTES
"  Patient:  Sylvester Carvalho  YOB: 1945  MRN: [id number]       Chief Complaint/Active Symptoms:       Sylvester Carvalho is a 78 y.o. male who returns today for cardiac follow-up.  Patient is here for routine follow-up after relatively recent bypass surgery.  He is pending before.  He had some variation in blood pressure.  We have made some adjustments in medications.  He is now going to cardiac rehab.  His atrial fibrillation has been resolved and was likely a perioperative issue.  He is shows me rhythm strips from cardiac rehab on a daily basis that showed no evidence of arrhythmia and he has no noticeable symptoms.  He would like to stop amiodarone which be fine.  Working to keep him on apixaban for 1 more month and then discontinue.  He will keep track at rehab if he has any arrhythmias and if not he can stop the apixaban at the end of May.  He will see me back in 3 months.  Will continue the current meds and form of prescription however we will have him take his amlodipine at night the full dose of 5 mg.  If his blood pressure tends to elevate we can increase his medications or adjust them accordingly.  6 magnesium and iron can be discontinued at his discretion.      Objective:     There were no vitals filed for this visit.    There were no vitals filed for this visit.    Allergies:     Allergies   Allergen Reactions    Penicillins Swelling     \"Whole body swelled up\"          Medications:     Current Outpatient Medications   Medication Instructions    amiodarone (Pacerone) 200 mg tablet Decreased dose starting 3/6/24 to 1 tablet by mouth once daily    amLODIPine (NORVASC) 2.5 mg, oral, 2 times daily    apixaban (ELIQUIS) 5 mg, oral, Every 12 hours    aspirin 81 mg, oral, Daily    atorvastatin (LIPITOR) 40 mg, oral, Daily    benazepriL-hydrochlorothiazide (Lotensin HCT) 20-25 mg tablet 1 tablet, oral, Daily    cholecalciferol (Vitamin D-3) 50 MCG (2000 UT) tablet 1 tablet, oral, Daily    furosemide (LASIX) " 20 mg, oral, Daily    iron polysaccharides (NU-IRON,NIFEREX) 150 mg, oral, Daily    magnesium oxide (MAG-OX) 400 mg, oral, Daily    metoprolol tartrate (LOPRESSOR) 25 mg, oral, 2 times daily    metoprolol tartrate (LOPRESSOR) 25 mg, oral, 2 times daily    mv-min/FA/vit K/lutein/zeaxant (PRESERVISION AREDS 2 PLUS MV ORAL) oral    nitroglycerin (NITROSTAT) 0.4 mg, sublingual, Every 5 min PRN    pantoprazole (PROTONIX) 40 mg, oral, Daily before breakfast, Do not crush, chew, or split.    potassium chloride CR (Klor-Con) 10 mEq ER tablet 10 mEq, oral, Daily, Do not crush, chew, or split.       Physical Examination:   Constitutional:       Appearance: Healthy appearance. Not in distress.   Neck:      Vascular: No JVR. JVD normal.   Pulmonary:      Effort: Pulmonary effort is normal.      Breath sounds: Normal breath sounds. No wheezing. No rhonchi. No rales.   Chest:      Chest wall: Not tender to palpatation.   Cardiovascular:      PMI at left midclavicular line. Normal rate. Regular rhythm. Normal S1. Normal S2.       Murmurs: There is no murmur.      No gallop.  No click. No rub.   Pulses:     Intact distal pulses.   Edema:     Peripheral edema absent.   Abdominal:      General: Bowel sounds are normal.      Palpations: Abdomen is soft.      Tenderness: There is no abdominal tenderness.   Musculoskeletal: Normal range of motion.         General: No tenderness. Skin:     General: Skin is warm and dry.   Neurological:      General: No focal deficit present.      Mental Status: Alert and oriented to person, place and time.            Lab:     CBC:   Lab Results   Component Value Date    WBC 6.2 04/09/2024    RBC 4.50 04/09/2024    HGB 13.7 04/09/2024    HCT 43.7 04/09/2024     04/09/2024        CMP:    Lab Results   Component Value Date     04/18/2024    K 3.9 04/18/2024     04/18/2024    CO2 30 04/18/2024    BUN 16 04/18/2024    CREATININE 1.02 04/18/2024    GLUCOSE 93 04/18/2024    CALCIUM 9.5  "04/18/2024       Magnesium:    Lab Results   Component Value Date    MG 2.08 02/28/2024       Lipid Profile:    Lab Results   Component Value Date    TRIG 190 (H) 01/31/2024    HDL 36.9 01/31/2024    LDLCALC 89 01/31/2024       TSH:    Lab Results   Component Value Date    TSH 3.19 02/20/2024       BNP:   No results found for: \"BNP\"     PT/INR:    Lab Results   Component Value Date    PROTIME 14.8 (H) 02/22/2024    INR 1.3 (H) 02/22/2024       HgBA1c:    Lab Results   Component Value Date    HGBA1C 5.6 02/20/2024       BMP:  Lab Results   Component Value Date     04/18/2024     04/09/2024     02/28/2024    K 3.9 04/18/2024    K 3.9 04/09/2024    K 4.1 02/28/2024     04/18/2024     (H) 04/09/2024     02/28/2024    CO2 30 04/18/2024    CO2 26 04/09/2024    CO2 23 02/28/2024    BUN 16 04/18/2024    BUN 17 04/09/2024    BUN 27 (H) 02/28/2024    CREATININE 1.02 04/18/2024    CREATININE 0.99 04/09/2024    CREATININE 0.98 02/28/2024       CBC:  Lab Results   Component Value Date    WBC 6.2 04/09/2024    WBC 7.1 02/28/2024    WBC 7.4 02/27/2024    RBC 4.50 04/09/2024    RBC 2.93 (L) 02/28/2024    RBC 3.06 (L) 02/27/2024    HGB 13.7 04/09/2024    HGB 9.4 (L) 02/28/2024    HGB 9.8 (L) 02/27/2024    HCT 43.7 04/09/2024    HCT 27.5 (L) 02/28/2024    HCT 29.2 (L) 02/27/2024    MCV 97 04/09/2024    MCV 94 02/28/2024    MCV 95 02/27/2024    MCH 30.4 04/09/2024    MCH 32.1 02/28/2024    MCH 32.0 02/27/2024    MCHC 31.4 (L) 04/09/2024    MCHC 34.2 02/28/2024    MCHC 33.6 02/27/2024    RDW 13.4 04/09/2024    RDW 12.8 02/28/2024    RDW 12.9 02/27/2024     04/09/2024     02/28/2024     02/27/2024       Cardiac Enzymes:    No results found for: \"TROPHS\"    Hepatic Function Panel:    Lab Results   Component Value Date    ALKPHOS 60 02/20/2024    ALT 18 02/20/2024    AST 19 02/20/2024    PROT 7.0 02/20/2024    BILITOT 0.9 02/20/2024         Diagnostic Studies:     ECG 12 " "lead    Result Date: 4/10/2024  Sinus bradycardia with 1st degree AV block Nonspecific T wave abnormality Abnormal ECG When compared with ECG of 28-FEB-2024 07:04, Sinus rhythm has replaced Atrial flutter Vent. rate has decreased BY  36 BPM ST no longer elevated in Inferior leads Non-specific change in ST segment in Lateral leads Inverted T waves have replaced nonspecific T wave abnormality in Lateral leads See ED provider note for full interpretation and clinical correlation Confirmed by Ashley Fuentes ([id number]) on 4/10/2024 6:16:11 PM      EKG:   No results found for: \"EKG\"      Radiology:     No orders to display       Assessment/Plan:         Patient Active Problem List   Diagnosis    History of squamous cell carcinoma    Allergic rhinitis    Atypical nevus    Benign hypertension    Cataract, nuclear sclerotic, both eyes    Diverticulosis of colon    Enlarged prostate with lower urinary tract symptoms (LUTS)    Eustachian tube dysfunction    Exudative age-related macular degeneration of both eyes with active choroidal neovascularization (Multi)    GERD (gastroesophageal reflux disease)    HLD (hyperlipidemia)    Hordeolum externum (stye)    Hyperbilirubinemia    Inflamed skin tag    Internal hordeolum of left eye    Macular degeneration    Both eyes affected by degenerative myopia with choroidal neovascularization    Osteoarthrosis    BMI 29.0-29.9,adult    Vitamin D deficiency    Pencilling of stools    Encounter for screening for malignant neoplasm of colon    Cyst of bone of right hand    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     Impression:  ASHD class II  Status post three-vessel coronary artery bypass surgery including LIMA to the LAD vein graft to the posterolateral branch and vein graft to the diagonal  Former smoker  Per lipidemia  Hypertension control  Paroxysmal atrial fibrillation perioperatively only  PLAN   Recommendation:  Discontinue amiodarone  Maintain apixaban for 1 more month  If rhythm remains stable can discontinue at that time  Take all of his amlodipine in the evening for better morning blood pressure control  See me back in 3 months  Call if any issue problems otherwise arise  Continue cardiac rehab

## 2024-04-25 NOTE — PATIENT INSTRUCTIONS
Continue same medications/treatment.  Patient educated on proper medication use.  Patient educated on risk factor modification.  Please bring any lab results from other providers/physicians to your next appointment.    Please bring all medicines, vitamins, and herbal supplements with you when you come to the office.    Prescriptions will not be filled unless you are compliant with your follow up appointments or have a follow up appointment scheduled as per instruction of your physician. Refills should be requested at the time of your visit.    Follow up in July  Stop Amiodarone  Continue Eliquis for one more month, then stop as long as your rhythm stays okay you can stop Eliquis  Stop Magnesium  Stop Iron  CHANGE Norvasc to 2 tabs once daily        I, MICHELE URIAS RN, AM SCRIBING FOR AND IN THE PRESENCE OF DR. ASHISH DARLING, DO, FACC

## 2024-04-25 NOTE — PROGRESS NOTES
INDIVIDUAL CARDIAC TREATMENT PLAN-30 DAY REASSESSMENT     Name: Sylvester Carvalho   Today's Date: 24   : 1945   Primary Provider: JUNITO Ashley DO  MRN: [id number]   Referring Physician: AMANDA Lin MD      Diagnosis: CABGx3   Onset Date: 24      Risk Stratification: High      NUTRITION REASSESSMENT  Lipids:   Lipid Lab Date:2024  Total Chol: 164  HDL: 36.9  LDL: 89  Tri  Cholesterol Med: Atorvastatin 40mg daily     Diabetes: No  HgbA1c: 5.6  Date Checked: 2024  Monitors glucose at home: No  Fasting Blood Sugar Range:  Frequency:  Hypoglycemic Episode:    Weight Management  Weight: 181 lbs   Height: 66 inches   BMI:  29  Pre Body Composition: 27.5%  Post Body Composition: NA  Pre Waist Circumference: 40.5 inches   Post Waist Circumference: NA   Current Diet: Heart Healthy mostly, little red meat, mostly chicken, fish, fruits/vegetables   Barriers to dietary change: Denies     Initial Dietary Assessment Score: 75/96  Discharge Dietary Assessment Score: NA      NUTRITION PLAN  Nutrition Goals:   1. Improve Picture Your Plate assessment results by discharge. Initial score 75/96. Patient eats heart healthy diet but planning to work on proper fueling for his exercise.   2. Make changes to diet to include heart healthy options while in the program. In progress, working to include more fruits/vegetables daily     Nutrition Intervention/Education:   *Reviewed initial Picture Your Plate results, score, goals, tip sheets and RD contact information to schedule 1:1 appointment   *Performing weekly weight checks on . Maintaining weight thus far.   *Body Composition completed by Exercise Physiologist, 27.5%. Will re-measure at discharge. Goal to lose one inch at waist circumference by discharge.       OTHER CORE COMPONENTS/ RISK FACTORS REASSESSMENT  Medication compliance: good compliance  Using pill box: Yes  Carries medication list: No    Blood Pressure Management:  History of High BP:  Yes  Resting BP: 122/64     Tobacco: FORMER  Form of tobacco: Cigarettes and Nicotine Gum (currently chews gum 3 times/daily)  Quit Date: 2000 (smoked 1 ppd for 20 years)   Anyone in the house smoke: No    Initial Knowledge Test Score: 11/15  Discharge Knowledge Test Score: NA     OTHER CORE COMPONENTS/ RISK FACTOR PLAN   Other Core components/Risk Factor Goals:                                                                                                                                                    1. Achieve and maintain a resting blood pressure less than 130/80 while in the program. In progress, demonstrating improvements in resting BP with recent change of Amlodipine 2.5mg BID per PCP at last week's appointment.  2. Gain knowledge of cardiac disease and lifestyle modifications related to exercise and ADL's prior to discharge. Patient attending education sessions, very engaged and asking appropriate questions    Other Components/ Risk Factors Intervention/Education:  *Monitoring HR, BP, dyspnea and arrhythmias each session. Tele showing SB-SR with rare PVCs, PACs noted. Vital signs stable at rest and with exertion. Asymptomatic.   *Meeting with patient to discuss goals & progress.  *Education provided: Angina & NTG Use; Medication Overview & Safety     PSYCHOSOCIAL REASSESSMENT  Patient reported stress level: mild  Using stress management skills: Yes walking, working in the yard/garden   HX of anxiety: Yes when he used to work, occasionally now but typically when something needs done   HX of depression: No  Patient questioned regarding any new stress, depression and anxiety symptoms: Yes    Family/Support System: Wife and children (son in [place] and daughter in [place])  Seeing mental health provider: No  Psychosocial medications: Denies     Initial PHQ-9 score: 2 (no risk)   Discharge PHQ-9 score: NA  Was PHQ-9 faxed to provider: No    Quality of Life Survey:   Pre PCS: 46.91  Post PCS: NA    Pre MCS:  "42.24  Post MCS: NA      Stages of change:  Preparation    PSYCHOSOCIAL PLAN  Psychosocial Goals:  1. Improve stage of change from preparation to action while in the program. Currently in preparation. Will meet at midway of CR to discuss further progress.   2. Maintain PHQ-9 category classification of \"no risk\" while in the program. Will re-administer PHQ-9 survey at midway review.     Psychosocial Interventions/ Education:  *Providing one on one emotional support and will facilitate peer support within the context of other phase II patients while in the program.   *Patient enjoying group centered exercise and education - very engaged with fellow patients  *Education provided: Stress Series (Good, Bad, & Ugly, Short Circuiting Stress, Mastering Stress)     EXERCISE REASSESSMENT  Home Exercise: Yes  Frequency: Almost daily, 30 minutes   Mode: Walking     EXERCISE PLAN  Exercise Goals:   1. Goal of 6.4 METs by discharge.  Currently working at 4.6 METs on treadmill.  Will continue to progress at tolerated per reported pain/symptoms  2. Have a plan in place for continued exercise after the program by discharge. In progress. Provided with home exercise guidelines but will discuss more as he moves through CR program.     Exercise Prescription:   Based on 12 Minute Walk Test  Frequency: 3 days per week  Duration (total aerobic min.): 30 minutes  Intensity RPE: 11-14  Target HR: Rest+30 (99-130bpm)   MET Level Range: 2.4-4.6     Modality METS Load  Duration   1 Warm Up    05:00   2 NuStep 3.5 96 martínez Level 6 06:00   3 Arms Airdyne 2.4 Level 0.8  06:00   4 Treadmill 4.6 3mph 3% 06:00   5 Upright Bike 4.2 60 martínez  Level 2 06:00   6 Weights  5-50lbs   06:00   7 Cool Down     05:00     Exercise Intervention/Education:   *Aiming to progress 1 MET every 5 weeks per reported symptoms/pain   *Incorporated resistance training for muscular endurance and strength; tolerating well with no issues.  *Education provided: Benefits of " Exercise; Home Exercise; Enjoying Exercise      Date of first exercise session: 4/10/24    LEARNING ASSESSMENT & BARRIERS  Readiness to Learn: Eager to participate; asks appropriate questions   Barriers: None      FALL RISK  high      INDIVIDUAL PATIENT GOALS:  1.To lose weight by discharge. Maintaining weight thus far   2.To achieve better general health by discharge. In progress, patient reports improved energy and satisfaction with his CR program thus far       MEDICATIONS  Amiodarone 200mg BID, Apixaban 5mg BID, Aspirin 81mg daily, Atorvastatin 40mg daily, Vit. D3 2000units daily, Iron 150mg daily, Magesium 400mg daily, Metoprolol 25mg BID, M. Vit for eyes 1 tab daily, Nitroglycerin 0.4mg every 5 min. PRN, Pantoprazole 40mg daily      STAFF COMMENTS:   Patient has completed 7/36 sessions of  2 cardiac rehab, following CABGx3 (LIMA>LAD, SVG>PL, SVG>Diag) on 2/22/2024. Initially, he was planning to complete 12 session but now feels the program to be very beneficial and enjoyable, so he will complete the full 36 sessions.  Thus far, telemetry is consistently showing SR with rare PACs, PVCs noted. He demonstrates stable cardiovascular response to exercise though has had concern with elevated resting BP readings. Per Dr. Ashley, he is now taking Amlodipine 2.5mg BID. We will continue to monitor closely.  He is also tolerating prescribed workloads with no reported angina, discomfort or dyspnea when questioned.  He is walking 30 minutes daily, outside, weather permitting and back to gardening and doing so without symptoms.  He denies any changes in his stress or mental health status. States he has great support from his wife and children/grandchildren.  Patient has been very engaged in education and has positive attitude toward fellow patients.  We will continue to help him reach his personal goals.

## 2024-04-26 ENCOUNTER — CLINICAL SUPPORT (OUTPATIENT)
Dept: CARDIAC REHAB | Facility: CLINIC | Age: 79
End: 2024-04-26
Payer: MEDICARE

## 2024-04-26 DIAGNOSIS — Z95.1 S/P CABG X 3: ICD-10-CM

## 2024-04-26 PROCEDURE — 93798 PHYS/QHP OP CAR RHAB W/ECG: CPT

## 2024-04-29 ENCOUNTER — CLINICAL SUPPORT (OUTPATIENT)
Dept: CARDIAC REHAB | Facility: CLINIC | Age: 79
End: 2024-04-29
Payer: MEDICARE

## 2024-04-29 DIAGNOSIS — Z95.1 S/P CABG X 3: ICD-10-CM

## 2024-04-29 PROCEDURE — 93798 PHYS/QHP OP CAR RHAB W/ECG: CPT

## 2024-05-01 ENCOUNTER — CLINICAL SUPPORT (OUTPATIENT)
Dept: CARDIAC REHAB | Facility: CLINIC | Age: 79
End: 2024-05-01
Payer: MEDICARE

## 2024-05-01 DIAGNOSIS — Z95.1 S/P CABG X 3: ICD-10-CM

## 2024-05-01 PROCEDURE — 93798 PHYS/QHP OP CAR RHAB W/ECG: CPT

## 2024-05-02 ENCOUNTER — PATIENT OUTREACH (OUTPATIENT)
Dept: CARDIOLOGY | Facility: CLINIC | Age: 79
End: 2024-05-02
Payer: MEDICARE

## 2024-05-02 NOTE — PROGRESS NOTES
90 day call  This CM called and spoke with patient to address any final questions or concerns regarding hospitalization. Patient reports doing well and has no further concerns. Patient is walking 4 miles and continues in cardio rehab

## 2024-05-03 ENCOUNTER — CLINICAL SUPPORT (OUTPATIENT)
Dept: CARDIAC REHAB | Facility: CLINIC | Age: 79
End: 2024-05-03
Payer: MEDICARE

## 2024-05-03 DIAGNOSIS — Z95.1 S/P CABG X 3: ICD-10-CM

## 2024-05-03 PROCEDURE — 93798 PHYS/QHP OP CAR RHAB W/ECG: CPT

## 2024-05-06 ENCOUNTER — CLINICAL SUPPORT (OUTPATIENT)
Dept: CARDIAC REHAB | Facility: CLINIC | Age: 79
End: 2024-05-06
Payer: MEDICARE

## 2024-05-06 DIAGNOSIS — Z95.1 S/P CABG X 3: ICD-10-CM

## 2024-05-06 PROCEDURE — 93798 PHYS/QHP OP CAR RHAB W/ECG: CPT

## 2024-05-08 ENCOUNTER — CLINICAL SUPPORT (OUTPATIENT)
Dept: CARDIAC REHAB | Facility: CLINIC | Age: 79
End: 2024-05-08
Payer: MEDICARE

## 2024-05-08 DIAGNOSIS — Z95.1 S/P CABG X 3: ICD-10-CM

## 2024-05-08 PROCEDURE — 93798 PHYS/QHP OP CAR RHAB W/ECG: CPT

## 2024-05-10 ENCOUNTER — CLINICAL SUPPORT (OUTPATIENT)
Dept: CARDIAC REHAB | Facility: CLINIC | Age: 79
End: 2024-05-10
Payer: MEDICARE

## 2024-05-10 DIAGNOSIS — Z95.1 S/P CABG X 3: ICD-10-CM

## 2024-05-10 PROCEDURE — 93798 PHYS/QHP OP CAR RHAB W/ECG: CPT

## 2024-05-13 ENCOUNTER — CLINICAL SUPPORT (OUTPATIENT)
Dept: CARDIAC REHAB | Facility: CLINIC | Age: 79
End: 2024-05-13
Payer: MEDICARE

## 2024-05-13 DIAGNOSIS — I25.10 CORONARY ARTERY DISEASE INVOLVING NATIVE CORONARY ARTERY OF NATIVE HEART, UNSPECIFIED WHETHER ANGINA PRESENT: ICD-10-CM

## 2024-05-13 DIAGNOSIS — I10 BENIGN HYPERTENSION: ICD-10-CM

## 2024-05-13 DIAGNOSIS — I48.0 PAROXYSMAL ATRIAL FIBRILLATION (MULTI): ICD-10-CM

## 2024-05-13 DIAGNOSIS — Z95.1 S/P CABG X 3: ICD-10-CM

## 2024-05-13 PROCEDURE — 93798 PHYS/QHP OP CAR RHAB W/ECG: CPT

## 2024-05-13 RX ORDER — METOPROLOL TARTRATE 25 MG/1
25 TABLET, FILM COATED ORAL 2 TIMES DAILY
Qty: 180 TABLET | Refills: 3 | Status: SHIPPED | OUTPATIENT
Start: 2024-05-13 | End: 2025-05-13

## 2024-05-13 NOTE — TELEPHONE ENCOUNTER
Received request for prescription refill for patient.  [facility]     Request is for Metoprolol 25mg  Is patient currently on medication- yes    Last OV- 4/25/24  Next OV- 7/25/24    Pended for signing and sent to provider.

## 2024-05-15 ENCOUNTER — CLINICAL SUPPORT (OUTPATIENT)
Dept: CARDIAC REHAB | Facility: CLINIC | Age: 79
End: 2024-05-15
Payer: MEDICARE

## 2024-05-15 DIAGNOSIS — Z95.1 S/P CABG X 3: ICD-10-CM

## 2024-05-15 PROCEDURE — 93798 PHYS/QHP OP CAR RHAB W/ECG: CPT

## 2024-05-17 ENCOUNTER — CLINICAL SUPPORT (OUTPATIENT)
Dept: CARDIAC REHAB | Facility: CLINIC | Age: 79
End: 2024-05-17
Payer: MEDICARE

## 2024-05-17 DIAGNOSIS — Z95.1 S/P CABG X 3: ICD-10-CM

## 2024-05-17 PROCEDURE — 93798 PHYS/QHP OP CAR RHAB W/ECG: CPT | Performed by: INTERNAL MEDICINE

## 2024-05-20 ENCOUNTER — CLINICAL SUPPORT (OUTPATIENT)
Dept: CARDIAC REHAB | Facility: CLINIC | Age: 79
End: 2024-05-20
Payer: MEDICARE

## 2024-05-20 DIAGNOSIS — Z95.1 S/P CABG X 3: ICD-10-CM

## 2024-05-20 PROCEDURE — 93798 PHYS/QHP OP CAR RHAB W/ECG: CPT | Performed by: INTERNAL MEDICINE

## 2024-05-20 NOTE — PROGRESS NOTES
INDIVIDUAL CARDIAC TREATMENT PLAN-60 DAY REASSESSMENT     Name: Sylvester Carvalho   Today's Date: 24   : 1945   Primary Provider: JUNITO Ashely DO  MRN: [id number]   Referring Physician: AMANDA Lin MD      Diagnosis: CABGx3   Onset Date: 24      Risk Stratification: High      NUTRITION REASSESSMENT  Lipids:   Lipid Lab Date:2024  Total Chol: 164  HDL: 36.9  LDL: 89  Tri  Cholesterol Med: Atorvastatin 40mg daily     Diabetes: No  HgbA1c: 5.6  Date Checked: 2024  Monitors glucose at home: No  Fasting Blood Sugar Range:  Frequency:  Hypoglycemic Episode:    Weight Management  Weight: 181 lbs   Height: 66 inches   BMI:  29  Pre Body Composition: 27.5%  Post Body Composition: NA  Pre Waist Circumference: 40.5 inches   Post Waist Circumference: NA   Current Diet: Heart Healthy mostly, little red meat, mostly chicken, fish, fruits/vegetables   Barriers to dietary change: Denies     Initial Dietary Assessment Score: 75/96  Discharge Dietary Assessment Score: NA      NUTRITION PLAN  Nutrition Goals:   1. Improve Picture Your Plate assessment results by discharge. Initial score 75/96. Patient eats heart healthy diet but planning to work on proper fueling for his exercise. Will administer follow up survey this week to complete and return.   2. Make changes to diet to include heart healthy options while in the program. In progress, working to include more fruits/vegetables daily. Will re-evaluate per Picture Your Plate survey.     Nutrition Intervention/Education:   *Encouraged to meet with RD while in CR.   *Performing weekly weight checks on . Maintaining weight thus far.   *Body Composition completed by Exercise Physiologist, 27.5%. Will re-measure at discharge. Goal to lose one inch at waist circumference by discharge.   *Education provided by CR staff: Managing Cholesterol; Individual Cholesterol Levels       OTHER CORE COMPONENTS/ RISK FACTORS REASSESSMENT  Medication compliance: good  compliance  Using pill box: Yes  Carries medication list: No    Blood Pressure Management:  History of High BP: Yes  Resting BP: 122/74     Tobacco: FORMER  Form of tobacco: Cigarettes and Nicotine Gum (currently chews gum 3 times/daily)  Quit Date: 2000 (smoked 1 ppd for 20 years)   Anyone in the house smoke: No    Initial Knowledge Test Score: 11/15  Discharge Knowledge Test Score: NA     OTHER CORE COMPONENTS/ RISK FACTOR PLAN   Other Core components/Risk Factor Goals:                                                                                                                                                    1. Achieve and maintain a resting blood pressure less than 130/80 while in the program.  In progress, consistently meeting standard at this time, with medication changes made last month  2. Gain knowledge of cardiac disease and lifestyle modifications related to exercise and ADL's prior to discharge. Patient attending education sessions, very engaged and asking appropriate questions. Remarks that he enjoys the education component of the program.     Other Components/ Risk Factors Intervention/Education:  *Monitoring HR, BP, dyspnea and arrhythmias each session. Tele showing SB-ST with rare PVCs, PACs noted. Vital signs stable at rest and with exertion. Asymptomatic.   *Meeting with patient to discuss goals & progress.  *Education provided: Angina & NTG Use; Medication Overview & Safety; HTN; Cardiac Anatomy & Physiology; Cardiac Diagnosis & Treatment; Electrical System    PSYCHOSOCIAL REASSESSMENT  Patient reported stress level: mild  Using stress management skills: Yes walking, working in the yard/garden   HX of anxiety: Yes when he used to work, occasionally now but typically when something needs done   HX of depression: No  Patient questioned regarding any new stress, depression and anxiety symptoms: Yes    Family/Support System: Wife and children (son in [place] and daughter in [place])  Seeing mental  "health provider: No  Psychosocial medications: Denies     Initial PHQ-9 score: 2 (no risk)   Discharge PHQ-9 score: NA  Was PHQ-9 faxed to provider: No    Quality of Life Survey:   Pre PCS: 46.91  Post PCS: NA    Pre MCS: 42.24  Post MCS: NA      Stages of change:  Preparation    PSYCHOSOCIAL PLAN  Psychosocial Goals:  1. Improve stage of change from preparation to action while in the program. Currently in preparation. Will meet at midway of CR to discuss further progress.   2. Maintain PHQ-9 category classification of \"no risk\" while in the program. Will re-administer PHQ-9 survey at midway review this week.     Psychosocial Interventions/ Education:  *Providing one on one emotional support and will facilitate peer support within the context of other phase II patients while in the program.   *Patient enjoying group centered exercise and education - very engaged with fellow patients  *Education provided: Stress Series (Good, Bad, & Ugly, Short Circuiting Stress, Mastering Stress); Emotional Aspects of Heart Disease (Depression & Attitude, Happiness is a Choice, Support Group)     EXERCISE REASSESSMENT  Home Exercise: Yes  Frequency: Almost daily, 30 minutes   Mode: Walking     EXERCISE PLAN  Exercise Goals:   1. Goal of 6.4 METs by discharge.  Currently working at 5.4 METs on recumbent bike.  Will continue to progress at tolerated per reported pain/symptoms  2. Have a plan in place for continued exercise after the program by discharge. In progress. Provided with home exercise guidelines but will discuss more as he moves through CR program.     Exercise Prescription:   Based on 12 Minute Walk Test  Frequency: 3 days per week  Duration (total aerobic min.): 30 minutes  Intensity RPE: 11-14  Target HR: Rest+30 (99-130bpm)   MET Level Range: 4-5.4     Modality METS Load  Duration   1 Warm Up    05:00   2 Recumbent Bike 5.4 90 martínez Level 5 06:00   3 Elliptical 4 Level 1  06:00   4 Treadmill 5.3 3.2mph 4% 06:00   5 " Upright Bike 4.9 78 martínez  Level 4 06:00   6 Weights  10-100lbs   06:00   7 Cool Down     05:00     Exercise Intervention/Education:   *Aiming to progress 1 MET every 5 weeks per reported symptoms/pain   *Incorporated resistance training for muscular endurance and strength; tolerating well with no issues.  *Education provided: Benefits of Exercise; Home Exercise; Enjoying Exercise; Resistance Training Parts 1&2; Flexibility      Date of first exercise session: 4/10/24    LEARNING ASSESSMENT & BARRIERS  Readiness to Learn: Eager to participate; asks appropriate questions   Barriers: None      FALL RISK  high      INDIVIDUAL PATIENT GOALS:  1.To lose weight by discharge. Maintaining weight thus far   2.To achieve better general health by discharge. In progress, patient reports improved energy and satisfaction with his CR program thus far       MEDICATIONS  Amiodarone 200mg BID, Apixaban 5mg BID, Aspirin 81mg daily, Atorvastatin 40mg daily, Vit. D3 2000units daily, Iron 150mg daily, Magesium 400mg daily, Metoprolol 25mg BID, M. Vit for eyes 1 tab daily, Nitroglycerin 0.4mg every 5 min. PRN, Pantoprazole 40mg daily      STAFF COMMENTS:   Patient has completed 18/36 sessions of ph 2 cardiac rehab, following CABGx3 (LIMA>LAD, SVG>PL, SVG>Diag) on 2/22/2024. Thus far, telemetry is consistently showing SR-ST with rare PACs, PVCs noted. He demonstrates stable cardiovascular response to exercise though has had concern with elevated resting BP readings. Per increase in Amlodipine last month, blood pressure readings are improved. He demonstrates a 33% increase in his exercise capacity, allowing him to work at a MET level range of 4-5.3.  He is also tolerating prescribed workloads with no reported angina, discomfort or dyspnea when questioned.  He is walking 30 minutes daily, outside, weather permitting and back to gardening and doing so without symptoms.  He denies any changes in his stress or mental health status. States he  has great support from his wife and children/grandchildren.  Patient has very positive energy and is engaged with peers.  We will continue to assist him in reaching his goals.

## 2024-05-22 ENCOUNTER — CLINICAL SUPPORT (OUTPATIENT)
Dept: CARDIAC REHAB | Facility: CLINIC | Age: 79
End: 2024-05-22
Payer: MEDICARE

## 2024-05-22 DIAGNOSIS — Z95.1 S/P CABG X 3: ICD-10-CM

## 2024-05-22 PROCEDURE — 93798 PHYS/QHP OP CAR RHAB W/ECG: CPT

## 2024-05-24 ENCOUNTER — CLINICAL SUPPORT (OUTPATIENT)
Dept: CARDIAC REHAB | Facility: CLINIC | Age: 79
End: 2024-05-24
Payer: MEDICARE

## 2024-05-24 DIAGNOSIS — Z95.1 S/P CABG X 3: ICD-10-CM

## 2024-05-24 PROCEDURE — 93798 PHYS/QHP OP CAR RHAB W/ECG: CPT

## 2024-05-29 ENCOUNTER — CLINICAL SUPPORT (OUTPATIENT)
Dept: CARDIAC REHAB | Facility: CLINIC | Age: 79
End: 2024-05-29
Payer: MEDICARE

## 2024-05-29 ENCOUNTER — OFFICE VISIT (OUTPATIENT)
Dept: OPHTHALMOLOGY | Facility: CLINIC | Age: 79
End: 2024-05-29
Payer: MEDICARE

## 2024-05-29 DIAGNOSIS — H44.23: Chronic | ICD-10-CM

## 2024-05-29 DIAGNOSIS — Z95.1 S/P CABG X 3: ICD-10-CM

## 2024-05-29 DIAGNOSIS — H35.3231 EXUDATIVE AGE-RELATED MACULAR DEGENERATION OF BOTH EYES WITH ACTIVE CHOROIDAL NEOVASCULARIZATION (MULTI): Primary | ICD-10-CM

## 2024-05-29 DIAGNOSIS — H35.053: Chronic | ICD-10-CM

## 2024-05-29 PROCEDURE — 67028 INJECTION EYE DRUG: CPT | Mod: BILATERAL PROCEDURE | Performed by: OPHTHALMOLOGY

## 2024-05-29 PROCEDURE — 93798 PHYS/QHP OP CAR RHAB W/ECG: CPT

## 2024-05-29 PROCEDURE — 92134 CPTRZ OPH DX IMG PST SGM RTA: CPT | Performed by: OPHTHALMOLOGY

## 2024-05-29 ASSESSMENT — ENCOUNTER SYMPTOMS
ENDOCRINE NEGATIVE: 0
CONSTITUTIONAL NEGATIVE: 0
RESPIRATORY NEGATIVE: 0
ALLERGIC/IMMUNOLOGIC NEGATIVE: 0
CARDIOVASCULAR NEGATIVE: 0
HEMATOLOGIC/LYMPHATIC NEGATIVE: 0
NEUROLOGICAL NEGATIVE: 0
PSYCHIATRIC NEGATIVE: 0
GASTROINTESTINAL NEGATIVE: 0
MUSCULOSKELETAL NEGATIVE: 0
EYES NEGATIVE: 1

## 2024-05-29 ASSESSMENT — SLIT LAMP EXAM - LIDS
COMMENTS: NORMAL
COMMENTS: NORMAL

## 2024-05-29 ASSESSMENT — CUP TO DISC RATIO
OS_RATIO: 0.5
OD_RATIO: 0.5

## 2024-05-29 ASSESSMENT — TONOMETRY
IOP_METHOD: GOLDMANN APPLANATION
OD_IOP_MMHG: 24
OS_IOP_MMHG: 16

## 2024-05-29 ASSESSMENT — EXTERNAL EXAM - RIGHT EYE: OD_EXAM: NORMAL

## 2024-05-29 ASSESSMENT — VISUAL ACUITY
OS_CC+: +2
OD_CC: 20/25
CORRECTION_TYPE: GLASSES
METHOD: SNELLEN - LINEAR
OS_CC: 20/50

## 2024-05-29 ASSESSMENT — EXTERNAL EXAM - LEFT EYE: OS_EXAM: NORMAL

## 2024-05-29 NOTE — PROGRESS NOTES
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 Recommend anti-VEGF injections. Eylea done OU today in a sterile manner with Betadine 5% for antisepsis.      FU 1m

## 2024-05-31 ENCOUNTER — CLINICAL SUPPORT (OUTPATIENT)
Dept: CARDIAC REHAB | Facility: CLINIC | Age: 79
End: 2024-05-31
Payer: MEDICARE

## 2024-05-31 DIAGNOSIS — Z95.1 S/P CABG X 3: ICD-10-CM

## 2024-05-31 PROCEDURE — 93798 PHYS/QHP OP CAR RHAB W/ECG: CPT

## 2024-06-03 ENCOUNTER — CLINICAL SUPPORT (OUTPATIENT)
Dept: CARDIAC REHAB | Facility: CLINIC | Age: 79
End: 2024-06-03
Payer: MEDICARE

## 2024-06-03 DIAGNOSIS — Z95.1 S/P CABG X 3: ICD-10-CM

## 2024-06-03 PROCEDURE — 93798 PHYS/QHP OP CAR RHAB W/ECG: CPT

## 2024-06-05 ENCOUNTER — CLINICAL SUPPORT (OUTPATIENT)
Dept: CARDIAC REHAB | Facility: CLINIC | Age: 79
End: 2024-06-05
Payer: MEDICARE

## 2024-06-05 DIAGNOSIS — Z95.1 S/P CABG X 3: ICD-10-CM

## 2024-06-05 PROCEDURE — 93798 PHYS/QHP OP CAR RHAB W/ECG: CPT

## 2024-06-07 ENCOUNTER — CLINICAL SUPPORT (OUTPATIENT)
Dept: CARDIAC REHAB | Facility: CLINIC | Age: 79
End: 2024-06-07
Payer: MEDICARE

## 2024-06-07 DIAGNOSIS — Z95.1 S/P CABG X 3: ICD-10-CM

## 2024-06-07 PROCEDURE — 93798 PHYS/QHP OP CAR RHAB W/ECG: CPT | Performed by: INTERNAL MEDICINE

## 2024-06-10 ENCOUNTER — APPOINTMENT (OUTPATIENT)
Dept: CARDIAC REHAB | Facility: CLINIC | Age: 79
End: 2024-06-10
Payer: MEDICARE

## 2024-06-10 NOTE — PROGRESS NOTES
FOLLOW UP PICTURE YOUR PLATE DIET ASSESSMENT  CARDIAC REHAB    SCORES:  Vegetables & Fruit (out of 12)                 10   Breads, Grains & Cereals (out of 12)        11  Red & Processed Meat (out of 12)         8  Poultry (out of 2)                                   2  Fish & Shellfish (out of 4)                      2  Beans, Nuts & Seeds (out of 4)             2  Milk & Dairy Foods (out of 6)              5  Toppings, Oils, Seasonings & Salt (out of 20)    17  Sweets, Snacks & Restaurant Food (out of 14)    9  Beverages (out of 10)          10     Overall Score (out of 96)    76  Pre vs Post Overall Score Change:  INCREASE/DECREASE: increase from 75    GOALS  Aim to consume at least 5 fruits and vegetables/d.     GOALS MET:  YES /NO: Yes -

## 2024-06-12 ENCOUNTER — APPOINTMENT (OUTPATIENT)
Dept: CARDIAC REHAB | Facility: CLINIC | Age: 79
End: 2024-06-12
Payer: MEDICARE

## 2024-06-14 ENCOUNTER — APPOINTMENT (OUTPATIENT)
Dept: CARDIAC REHAB | Facility: CLINIC | Age: 79
End: 2024-06-14
Payer: MEDICARE

## 2024-06-17 ENCOUNTER — CLINICAL SUPPORT (OUTPATIENT)
Dept: CARDIAC REHAB | Facility: CLINIC | Age: 79
End: 2024-06-17
Payer: MEDICARE

## 2024-06-17 DIAGNOSIS — Z95.1 S/P CABG X 3: ICD-10-CM

## 2024-06-17 PROCEDURE — 93798 PHYS/QHP OP CAR RHAB W/ECG: CPT

## 2024-06-19 ENCOUNTER — CLINICAL SUPPORT (OUTPATIENT)
Dept: CARDIAC REHAB | Facility: CLINIC | Age: 79
End: 2024-06-19
Payer: MEDICARE

## 2024-06-19 DIAGNOSIS — Z95.1 S/P CABG X 3: ICD-10-CM

## 2024-06-19 PROCEDURE — 93798 PHYS/QHP OP CAR RHAB W/ECG: CPT

## 2024-06-20 NOTE — PROGRESS NOTES
INDIVIDUAL CARDIAC TREATMENT PLAN-90 DAY REASSESSMENT     Name: Sylvester Carvalho   Today's Date: 24   : 1945   Primary Provider: JUNITO Ashley DO  MRN: [id number]   Referring Physician: AMANDA Lin MD      Diagnosis: CABGx3   Onset Date: 24      Risk Stratification: High      NUTRITION REASSESSMENT  Lipids:   Lipid Lab Date:2024  Total Chol: 164  HDL: 36.9  LDL: 89  Tri  Cholesterol Med: Atorvastatin 40mg daily     Diabetes: No  HgbA1c: 5.6  Date Checked: 2024  Monitors glucose at home: No  Fasting Blood Sugar Range:  Frequency:  Hypoglycemic Episode:    Weight Management  Weight: 183 lbs   Height: 66 inches   BMI:  29.5  Pre Body Composition: 27.5%  Post Body Composition: NA  Pre Waist Circumference: 40.5 inches   Post Waist Circumference: NA   Current Diet: Heart Healthy mostly, little red meat, mostly chicken, fish, fruits/vegetables   Barriers to dietary change: Denies     Initial Dietary Assessment Score: 75/96  Discharge Dietary Assessment Score: 76/96      NUTRITION PLAN  Nutrition Goals:   1. Improve Picture Your Plate assessment results by discharge. Initial score 75/96. Patient eats heart healthy diet but planning to work on proper fueling for his exercise. MET with minimal improvement in follow up score of 76/96.  2. Make changes to diet to include heart healthy options while in the program. In progress, working to include more fruits/vegetables daily. MET per Picture Your Plate survey results.     Nutrition Intervention/Education:   *Encouraged to meet with RD while in CR.   *Performing weekly weight checks on .   *Body Composition completed by Exercise Physiologist, 27.5%. Will re-measure at discharge. Goal to lose one inch at waist circumference by discharge.   *Education provided by CR staff: Managing Cholesterol; Individual Cholesterol Levels; BMI/Body Composition  *Education provided by RD: Building a Healthy Plate; Dietary Fats; Sodium      OTHER CORE COMPONENTS/  "RISK FACTORS REASSESSMENT  Medication compliance: good compliance  Using pill box: Yes  Carries medication list: No    Blood Pressure Management:  History of High BP: Yes  Resting BP: 112/70     Tobacco: FORMER  Form of tobacco: Cigarettes and Nicotine Gum (currently chews gum 3 times/daily)  Quit Date: 2000 (smoked 1 ppd for 20 years)   Anyone in the house smoke: No    Initial Knowledge Test Score: 11/15  Discharge Knowledge Test Score: NA     OTHER CORE COMPONENTS/ RISK FACTOR PLAN   Other Core components/Risk Factor Goals:                                                                                                                                                    1. Achieve and maintain a resting blood pressure less than 130/80 while in the program.  MET, consistently meeting standard at this time  2. Gain knowledge of cardiac disease and lifestyle modifications related to exercise and ADL's prior to discharge. Patient attending education sessions, very engaged and asking appropriate questions. Remarks that he enjoys the education component of the program, finding it very helpful in his personal management of heart disease    Other Components/ Risk Factors Intervention/Education:  *Monitoring HR, BP, dyspnea and arrhythmias each session. Tele showing SB-ST with rare PVCs, PACs noted. Vital signs stable at rest and with exertion. Asymptomatic.   *Meeting with patient to discuss goals & progress.  *Education provided: Angina & NTG Use; Medication Overview & Safety; HTN; Cardiac Anatomy & Physiology; Cardiac Diagnosis & Treatment; Electrical System; Stroke Awareness; Pharmacist     PSYCHOSOCIAL REASSESSMENT  Patient reported stress level: Very mild - patient states \"I have great support and a positive attitude toward life\"  Using stress management skills: Yes walking, working in the yard/garden   HX of anxiety: Yes when he used to work, occasionally now but typically when something needs done   HX of depression: " "No  Patient questioned regarding any new stress, depression and anxiety symptoms: Yes    Family/Support System: Wife and children (son in [place] and daughter in [place])  Seeing mental health provider: No  Psychosocial medications: Denies     Initial PHQ-9 score: 2 (no risk)   Discharge PHQ-9 score: NA  Was PHQ-9 faxed to provider: No    Quality of Life Survey:   Pre PCS: 46.91  Post PCS: NA    Pre MCS: 42.24  Post MCS: NA      Stages of change:  Action    PSYCHOSOCIAL PLAN  Psychosocial Goals:  1. Improve stage of change from preparation to action while in the program. MET with current heart healthy lifestyle.   2. Maintain PHQ-9 category classification of \"no risk\" while in the program. MET with midway PHQ-9 score of 2 - will follow up one last time at discharge.     Psychosocial Interventions/ Education:  *Providing one on one emotional support and will facilitate peer support within the context of other phase II patients while in the program.   *Patient enjoying group centered exercise and education - very engaged with fellow patients  *Education provided: Stress Series (Good, Bad, & Ugly, Short Circuiting Stress, Mastering Stress); Emotional Aspects of Heart Disease (Depression & Attitude, Happiness is a Choice, Support Group)     EXERCISE REASSESSMENT  Home Exercise: Yes  Frequency: Almost daily  Mode: Walking 3 miles    EXERCISE PLAN  Exercise Goals:   1. Goal of 6.4 METs by discharge.  Currently working at 5.8 METs on treadmill.  Will continue to progress at tolerated per reported pain/symptoms  2. Have a plan in place for continued exercise after the program by discharge. In progress. Provided with home exercise guidelines but will discuss more as he moves through CR program.     Exercise Prescription:   Based on 12 Minute Walk Test  Frequency: 3 days per week  Duration (total aerobic min.): 30 minutes  Intensity RPE: 11-14  Target HR: Rest+30 (99-130bpm)   MET Level Range: 4.7-5.8     Modality METS Load  " Duration   1 Warm Up    05:00   2 Recumbent Bike 5.4 90 martínez Level 5 06:00   3 Elliptical 5 Level 2  06:00   4 Treadmill 5.8 3.3mph 5% 06:00   5 Rower 4.7 30 martínez  Level 3 06:00   6 Weights  10-100lbs   06:00   7 Cool Down     05:00     Exercise Intervention/Education:   *Aiming to progress 1 MET every 5 weeks per reported symptoms/pain   *Incorporated resistance training for muscular endurance and strength; tolerating well with no issues.  *Education provided: Benefits of Exercise; Home Exercise; Enjoying Exercise; Resistance Training Parts 1&2; Flexibility; Temperature Extremes & Hydration      Date of first exercise session: 4/10/24    LEARNING ASSESSMENT & BARRIERS  Readiness to Learn: Eager to participate; asks appropriate questions   Barriers: None      FALL RISK  high      INDIVIDUAL PATIENT GOALS:  1.To lose weight by discharge. Maintaining weight thus far, will perform follow up body composition measurements at session #34  2.To achieve better general health by discharge. In progress, patient reports improved energy and satisfaction with his CR program thus far       MEDICATIONS  Amiodarone 200mg BID, Apixaban 5mg BID, Aspirin 81mg daily, Atorvastatin 40mg daily, Vit. D3 2000units daily, Iron 150mg daily, Magesium 400mg daily, Metoprolol 25mg BID, M. Vit for eyes 1 tab daily, Nitroglycerin 0.4mg every 5 min. PRN, Pantoprazole 40mg daily      STAFF COMMENTS:   Patient has completed 27/36 sessions of ph 2 cardiac rehab, following CABGx3 (LIMA>LAD, SVG>PL, SVG>Diag) on 2/22/2024. Thus far, telemetry is consistently showing SR-ST with rare PACs, PVCs noted. He demonstrates stable HR and BP response to exercise. He demonstrates a 45% increase in his exercise capacity, allowing him to work at a MET level range of 4.7-5.8.  He is also tolerating prescribed workloads with no reported angina, discomfort or dyspnea when questioned.  He is walking 3 miles daily, outside, weather permitting and back to gardening and  doing so without symptoms.  He denies any changes in his stress or mental health status. States he has great support from his wife and children/grandchildren.  Patient has very positive energy and is engaged with peers.  We will continue to assist him in reaching his goals.

## 2024-06-21 ENCOUNTER — CLINICAL SUPPORT (OUTPATIENT)
Dept: CARDIAC REHAB | Facility: CLINIC | Age: 79
End: 2024-06-21
Payer: MEDICARE

## 2024-06-21 DIAGNOSIS — Z95.1 S/P CABG X 3: ICD-10-CM

## 2024-06-21 PROCEDURE — 93798 PHYS/QHP OP CAR RHAB W/ECG: CPT

## 2024-06-24 ENCOUNTER — CLINICAL SUPPORT (OUTPATIENT)
Dept: CARDIAC REHAB | Facility: CLINIC | Age: 79
End: 2024-06-24
Payer: MEDICARE

## 2024-06-24 DIAGNOSIS — Z95.1 S/P CABG X 3: ICD-10-CM

## 2024-06-24 PROCEDURE — 93798 PHYS/QHP OP CAR RHAB W/ECG: CPT

## 2024-06-26 ENCOUNTER — CLINICAL SUPPORT (OUTPATIENT)
Dept: CARDIAC REHAB | Facility: CLINIC | Age: 79
End: 2024-06-26
Payer: MEDICARE

## 2024-06-26 DIAGNOSIS — Z95.1 S/P CABG X 3: ICD-10-CM

## 2024-06-26 PROCEDURE — 93798 PHYS/QHP OP CAR RHAB W/ECG: CPT

## 2024-06-28 ENCOUNTER — CLINICAL SUPPORT (OUTPATIENT)
Dept: CARDIAC REHAB | Facility: CLINIC | Age: 79
End: 2024-06-28
Payer: MEDICARE

## 2024-06-28 DIAGNOSIS — Z95.1 S/P CABG X 3: ICD-10-CM

## 2024-06-28 PROCEDURE — 93798 PHYS/QHP OP CAR RHAB W/ECG: CPT | Performed by: INTERNAL MEDICINE

## 2024-07-01 ENCOUNTER — CLINICAL SUPPORT (OUTPATIENT)
Dept: CARDIAC REHAB | Facility: CLINIC | Age: 79
End: 2024-07-01
Payer: MEDICARE

## 2024-07-01 DIAGNOSIS — Z95.1 S/P CABG X 3: ICD-10-CM

## 2024-07-01 PROCEDURE — 93798 PHYS/QHP OP CAR RHAB W/ECG: CPT

## 2024-07-03 ENCOUNTER — CLINICAL SUPPORT (OUTPATIENT)
Dept: CARDIAC REHAB | Facility: CLINIC | Age: 79
End: 2024-07-03
Payer: MEDICARE

## 2024-07-03 DIAGNOSIS — Z95.1 S/P CABG X 3: ICD-10-CM

## 2024-07-03 PROCEDURE — 93798 PHYS/QHP OP CAR RHAB W/ECG: CPT

## 2024-07-05 ENCOUNTER — APPOINTMENT (OUTPATIENT)
Dept: CARDIAC REHAB | Facility: CLINIC | Age: 79
End: 2024-07-05
Payer: MEDICARE

## 2024-07-05 DIAGNOSIS — Z95.1 S/P CABG X 3: ICD-10-CM

## 2024-07-05 PROCEDURE — 93798 PHYS/QHP OP CAR RHAB W/ECG: CPT

## 2024-07-08 ENCOUNTER — APPOINTMENT (OUTPATIENT)
Dept: PRIMARY CARE | Facility: CLINIC | Age: 79
End: 2024-07-08
Payer: MEDICARE

## 2024-07-08 ENCOUNTER — TRANSCRIBE ORDERS (OUTPATIENT)
Dept: CARDIAC REHAB | Facility: CLINIC | Age: 79
End: 2024-07-08

## 2024-07-08 ENCOUNTER — APPOINTMENT (OUTPATIENT)
Dept: CARDIAC REHAB | Facility: CLINIC | Age: 79
End: 2024-07-08
Payer: MEDICARE

## 2024-07-08 VITALS
SYSTOLIC BLOOD PRESSURE: 130 MMHG | BODY MASS INDEX: 29.41 KG/M2 | DIASTOLIC BLOOD PRESSURE: 80 MMHG | HEART RATE: 55 BPM | TEMPERATURE: 97.6 F | WEIGHT: 183 LBS | HEIGHT: 66 IN

## 2024-07-08 DIAGNOSIS — I10 BENIGN HYPERTENSION: ICD-10-CM

## 2024-07-08 DIAGNOSIS — Z95.1 S/P CABG X 3: Primary | ICD-10-CM

## 2024-07-08 DIAGNOSIS — K21.9 GASTROESOPHAGEAL REFLUX DISEASE WITHOUT ESOPHAGITIS: ICD-10-CM

## 2024-07-08 DIAGNOSIS — Z00.00 MEDICARE ANNUAL WELLNESS VISIT, SUBSEQUENT: Primary | ICD-10-CM

## 2024-07-08 DIAGNOSIS — E55.9 VITAMIN D DEFICIENCY: ICD-10-CM

## 2024-07-08 DIAGNOSIS — Z95.1 S/P CORONARY ARTERY BYPASS GRAFT X 3: ICD-10-CM

## 2024-07-08 DIAGNOSIS — Z12.5 ENCOUNTER FOR SCREENING FOR MALIGNANT NEOPLASM OF PROSTATE: ICD-10-CM

## 2024-07-08 DIAGNOSIS — Z87.891 FORMER CIGARETTE SMOKER: ICD-10-CM

## 2024-07-08 DIAGNOSIS — J30.1 SEASONAL ALLERGIC RHINITIS DUE TO POLLEN: ICD-10-CM

## 2024-07-08 DIAGNOSIS — E78.2 MIXED HYPERLIPIDEMIA: ICD-10-CM

## 2024-07-08 PROCEDURE — 99214 OFFICE O/P EST MOD 30 MIN: CPT | Performed by: FAMILY MEDICINE

## 2024-07-08 PROCEDURE — 1036F TOBACCO NON-USER: CPT | Performed by: FAMILY MEDICINE

## 2024-07-08 PROCEDURE — G0444 DEPRESSION SCREEN ANNUAL: HCPCS | Performed by: FAMILY MEDICINE

## 2024-07-08 PROCEDURE — 3079F DIAST BP 80-89 MM HG: CPT | Performed by: FAMILY MEDICINE

## 2024-07-08 PROCEDURE — 1157F ADVNC CARE PLAN IN RCRD: CPT | Performed by: FAMILY MEDICINE

## 2024-07-08 PROCEDURE — 99497 ADVNCD CARE PLAN 30 MIN: CPT | Performed by: FAMILY MEDICINE

## 2024-07-08 PROCEDURE — 1159F MED LIST DOCD IN RCRD: CPT | Performed by: FAMILY MEDICINE

## 2024-07-08 PROCEDURE — 1158F ADVNC CARE PLAN TLK DOCD: CPT | Performed by: FAMILY MEDICINE

## 2024-07-08 PROCEDURE — G0439 PPPS, SUBSEQ VISIT: HCPCS | Performed by: FAMILY MEDICINE

## 2024-07-08 PROCEDURE — 1123F ACP DISCUSS/DSCN MKR DOCD: CPT | Performed by: FAMILY MEDICINE

## 2024-07-08 PROCEDURE — 3075F SYST BP GE 130 - 139MM HG: CPT | Performed by: FAMILY MEDICINE

## 2024-07-08 RX ORDER — PANTOPRAZOLE SODIUM 40 MG/1
40 TABLET, DELAYED RELEASE ORAL
Qty: 90 TABLET | Refills: 3 | Status: SHIPPED | OUTPATIENT
Start: 2024-07-08

## 2024-07-08 ASSESSMENT — PATIENT HEALTH QUESTIONNAIRE - PHQ9
1. LITTLE INTEREST OR PLEASURE IN DOING THINGS: NOT AT ALL
2. FEELING DOWN, DEPRESSED OR HOPELESS: NOT AT ALL
SUM OF ALL RESPONSES TO PHQ9 QUESTIONS 1 AND 2: 0

## 2024-07-08 NOTE — PROGRESS NOTES
Advance Care Planning Note     Discussion Date: 07/08/24   Discussion Participants: patient    The patient wishes to discuss Advance Care Planning today and the following is a brief summary of our discussion.     Patient has capacity to make their own medical decisions: Yes  Health Care Agent/Surrogate Decision Maker documented in chart: Yes    Documents on file and valid:  Advance Directive/Living Will: Yes   Health Care Power of : Yes  Other: spouse    Communication of Medical Status/Prognosis:   tbs     Communication of Treatment Goals/Options:   tbs     Treatment Decisions  Goals of Care: survival is paramount regardless of prognosis, treatment outcome, or burden   tbs  Follow Up Plan  tbs  Team Members  tbs  Time Statement: Total face to face time spent on advance care planning was >15 minutes with 16 minutes spent in counseling, including the explanation.  Patient did have open heart surgery still in rehab.    Cedric Ashley, DO  7/8/2024 9:02 AM    Patient is here for follow-up of acid reflux.  Hypertension is well-controlled.  No chest pain or palpitations is doing excellent with rehab.    REVIEW OF SYSTEMS: 12 systems negative except for those mentioned in the HPI. Reviewed home risks with patient. Patient feels safe at home.    PHYSICAL EXAMINATION:   Constitutional: The patient is alert and oriented x3, in no acute  distress.  Eyes: Extraocular movements are intact. Pupils are equal and reactive to light  ENT: Bilateral TMs within normal limits. Nasal turbinates are within normal limits. Throat within normal limits.  Neck: Supple without lymphadenopathy or JVD.  Heart: Regular rate and rhythm without murmur, click, gallop, or rub.  Lungs: Clear to auscultation bilaterally. No rales, rhonchi, or  wheezing.  Abdomen: Soft, nontender, nondistended. Normoactive bowel sounds.   No palpable masses. Normal percussion  Musculoskeletal: 5/5 motor, upper and lower extremities.  Neurologic: Cranial nerves II  through XII fully intact. +2/4 DTRs  in ankle and knee.  Psychiatric: Good judgment and insight. Normal affect and mood. No cognitive defects noted.  Lymphatic: Negative as noted above.  Skin: no rashes or lesions    Assessment:  Per EMR    Plan:  Patient is up-to-date with screening as well as colonoscopy.  Greater than 5 minutes reviewed PHQ-9 patient is doing excellent and has done well postsurgery.  Patient can also work on weight loss can focus on around 1550 heide both discussed.    Patient with acid reflux well-controlled also post CABG I will continue medication.  Hypertension well-controlled continue on current medications.  Hyperlipidemia well-controlled post CABG keep on max dose.  Also will check prostate follow-up in 6 months      Discussed with the patient and reviewed annual wellness questionnaire form as well as cognitive deficits. Also discussed with the patient immunizations and risks for colonoscopy and other screening procedures    This dictation was created using Dragon dictation and may contain errors

## 2024-07-10 ENCOUNTER — APPOINTMENT (OUTPATIENT)
Dept: CARDIAC REHAB | Facility: CLINIC | Age: 79
End: 2024-07-10
Payer: MEDICARE

## 2024-07-10 DIAGNOSIS — Z95.1 S/P CABG X 3: ICD-10-CM

## 2024-07-10 PROCEDURE — 93798 PHYS/QHP OP CAR RHAB W/ECG: CPT

## 2024-07-12 ENCOUNTER — CLINICAL SUPPORT (OUTPATIENT)
Dept: CARDIAC REHAB | Facility: CLINIC | Age: 79
End: 2024-07-12
Payer: MEDICARE

## 2024-07-12 DIAGNOSIS — Z95.1 S/P CABG X 3: ICD-10-CM

## 2024-07-12 PROCEDURE — 93798 PHYS/QHP OP CAR RHAB W/ECG: CPT

## 2024-07-12 NOTE — PROGRESS NOTES
INDIVIDUAL CARDIAC TREATMENT PLAN-DISCHARGE SUMMARY      Name: Sylvester Carvalho   Today's Date: 24   : 1945   Primary Provider: JUNITO Ashley DO  MRN: [id number]   Referring Physician: AMANDA Lin MD      Diagnosis: CABGx3   Onset Date: 24      Risk Stratification: High      NUTRITION DISCHARGE/FOLLOW-UP  Lipids:   Lipid Lab Date:2024  Total Chol: 164  HDL: 36.9  LDL: 89  Tri  Cholesterol Med: Atorvastatin 40mg daily     Diabetes: No  HgbA1c: 5.6  Date Checked: 2024  Monitors glucose at home: No  Fasting Blood Sugar Range:  Frequency:  Hypoglycemic Episode:    Weight Management  Weight: 182 lbs   Height: 66 inches   BMI:  29.3  Pre Body Composition: 27.5%  Post Body Composition: 25.8% (^5lbs lean weight, lost 3lbs fat weight)  Pre Waist Circumference: 40.5 inches   Post Waist Circumference: 39.5 inches (down 1 inch)   Current Diet: Heart Healthy mostly, little red meat, mostly chicken, fish, fruits/vegetables   Barriers to dietary change: Denies     Initial Dietary Assessment Score: 75/96  Discharge Dietary Assessment Score: 76/96      NUTRITION PLAN  Nutrition Goals:   1. Improve Picture Your Plate assessment results by discharge. Initial score 75/96. Patient eats heart healthy diet but planning to work on proper fueling for his exercise. MET with minimal improvement in follow up score of 76/96.  2. Make changes to diet to include heart healthy options while in the program. In progress, working to include more fruits/vegetables daily. MET per Picture Your Plate survey results.     Nutrition Intervention/Education:   *Body Composition completed by Exercise Physiologist, 27.5%. Remeasured at session #33 - patient achieved goal, losing one inch at waist circumference   *Education provided by CR staff: Managing Cholesterol; Individual Cholesterol Levels; BMI/Body Composition  *Education provided by RD: Building a Healthy Plate; Dietary Fats; Sodium      OTHER CORE COMPONENTS/ RISK FACTORS  "DISCHARGE/FOLLOW-UP  Medication compliance: good compliance  Using pill box: Yes  Carries medication list: No    Blood Pressure Management:  History of High BP: Yes  Resting BP: 118/60    Tobacco: FORMER  Form of tobacco: Cigarettes and Nicotine Gum (currently chews gum 3 times/daily)  Quit Date: 2000 (smoked 1 ppd for 20 years)   Anyone in the house smoke: No    Initial Knowledge Test Score: 11/15  Discharge Knowledge Test Score: 15/15    OTHER CORE COMPONENTS/ RISK FACTOR PLAN   Other Core components/Risk Factor Goals:                                                                                                                                                    1. Achieve and maintain a resting blood pressure less than 130/80 while in the program.  MET, consistently meeting standard at this time  2. Gain knowledge of cardiac disease and lifestyle modifications related to exercise and ADL's prior to discharge. MET, patient attended education sessions, very engaged and asked appropriate questions. Remarks that he enjoyed the education component of the program, found it very helpful in his personal management of heart disease. Patient also improved score on knowledge test.      Other Components/ Risk Factors Intervention/Education:  *Monitored HR, BP, dyspnea and arrhythmias each session. Tele showing SB-ST with rare PVCs, PACs noted. Vital signs stable at rest and with exertion. Asymptomatic.   *Met with patient to discuss goals & progress.  *Education provided: Angina & NTG Use; Medication Overview & Safety; HTN; Cardiac Anatomy & Physiology; Cardiac Diagnosis & Treatment; Electrical System; Stroke Awareness; Pharmacist; Quiz Review    PSYCHOSOCIAL DISCHARGE/FOLLOW-UP  Patient reported stress level: Very mild - patient states \"I have great support and a positive attitude toward life\"  Using stress management skills: Yes walking, working in the yard/garden   HX of anxiety: Yes when he used to work, occasionally " "now but typically when something needs done   HX of depression: No  Patient questioned regarding any new stress, depression and anxiety symptoms: Yes    Family/Support System: Wife and children (son in [place] and daughter in [place])  Seeing mental health provider: No  Psychosocial medications: Denies     Initial PHQ-9 score: 2 (no risk)   Discharge PHQ-9 score: 0 (no risk)  Was PHQ-9 faxed to provider: No    Quality of Life Survey:   Pre PCS: 46.91  Post PCS: 52.22 (^11%)    Pre MCS: 42.24  Post MCS: 60.32 (^16%)      Stages of change:  Action    PSYCHOSOCIAL PLAN  Psychosocial Goals:  1. Improve stage of change from preparation to action while in the program. MET with current heart healthy lifestyle.   2. Maintain PHQ-9 category classification of \"no risk\" while in the program. MET with follow up score of 0 - patient states he has great supportive family, with a strong, positive attitude and never has dealt with depression.    Psychosocial Interventions/ Education:  *Education provided: Stress Series (Good, Bad, & Ugly, Short Circuiting Stress, Mastering Stress); Emotional Aspects of Heart Disease (Depression & Attitude, Happiness is a Choice, Support Group)     EXERCISE DISCHARGE/FOLLOW-UP  Home Exercise: Yes  Frequency: Almost daily  Mode: Walking 3 miles    EXERCISE PLAN  Exercise Goals:   1. Goal of 6.4 METs by discharge. MET with exercise capacity at 6.1 METS on treadmill.   2. Have a plan in place for continued exercise after the program by discharge. MET, patient planning to continue with home exercise, has complete gym set up in his basement. Patient feels very confident in ability to maintain current fitness     Exercise Prescription:   Based on 12 Minute Walk Test  Frequency: 3 days per week  Duration (total aerobic min.): 30 minutes  Intensity RPE: 11-14  Target HR: Rest+30 (99-130bpm)   MET Level Range: 5.4-6.1     Modality METS Load  Duration   1 Warm Up    05:00   2 Recumbent Bike 5.4 90 martínez Level " 5 06:00   3 Elliptical 6 Level 3  06:00   4 Treadmill 6.1 3.5 mph 5% 06:00   5 Rower 5.9 60 martínez  Level 4 06:00   6 Weights  10-110lbs   06:00   7 Cool Down     05:00     Exercise Intervention/Education:   *Reviewed discharge activity guidelines and outcomes measures.   *Education provided: Benefits of Exercise; Home Exercise; Enjoying Exercise; Resistance Training Parts 1&2; Flexibility; Temperature Extremes & Hydration; Intimacy & Heart Disease; Maintenance Exercise; Individual MET Levels    Date of first exercise session: 4/10/24    LEARNING ASSESSMENT & BARRIERS  Readiness to Learn: Eager to participate; asks appropriate questions   Barriers: None    FALL RISK  high    INDIVIDUAL PATIENT GOALS:  1.To lose weight by discharge. NOT MET, though patient demonstrated significant changes in body composition - losing 3lbs fat weight, gaining 5lbs lean weight and losing one inch at waist.   2.To achieve better general health by discharge. MET, patient reports improved energy, stamina and strength.       MEDICATIONS  Amlodipine 5mg daily; Aspirin 81mg daily, Atorvastatin 40mg daily, Vit. D3 2000units daily, Iron 150mg daily, Magesium 400mg daily, Metoprolol 25mg BID, M. Vit for eyes 1 tab daily, Nitroglycerin 0.4mg every 5 min. PRN, Pantoprazole 40mg daily; Potassium chloride 10mEq daily       STAFF COMMENTS:   Patient completed 36/36 sessions of  2 cardiac rehab, following CABGx3 (LIMA>LAD, SVG>PL, SVG>Diag) on 2/22/2024.  Overall, patient demonstrated a 53% increase in his exercise capacity, allowing him to work at a MET level range of 5.4-6.1.  He tolerated prescribed workloads with no reported angina, discomfort or dyspnea when questioned. Telemetry consistently showed SB-ST with PACs, PVCs noted. He worked at his THR with stable BP readings, all while remaining asymptomatic. He walks 3 miles daily, outside, weather permitting and back to gardening and doing so without symptoms.  States he has great support from  his wife and children/grandchildren, and has no concerns with his mental health. He is thankful for his participation in the program and his improved confidence in his body. He will continue with home exercise, as he has full home gym in his basement. Thank you for allowing us to care for Sylvester in his recovery.

## 2024-07-25 ENCOUNTER — APPOINTMENT (OUTPATIENT)
Dept: CARDIOLOGY | Facility: CLINIC | Age: 79
End: 2024-07-25
Payer: MEDICARE

## 2024-07-25 VITALS
SYSTOLIC BLOOD PRESSURE: 138 MMHG | DIASTOLIC BLOOD PRESSURE: 84 MMHG | HEIGHT: 66 IN | WEIGHT: 185.5 LBS | BODY MASS INDEX: 29.81 KG/M2 | HEART RATE: 46 BPM

## 2024-07-25 DIAGNOSIS — E78.2 MIXED HYPERLIPIDEMIA: ICD-10-CM

## 2024-07-25 DIAGNOSIS — R00.1 BRADYCARDIA: ICD-10-CM

## 2024-07-25 DIAGNOSIS — R93.1 ELEVATED CORONARY ARTERY CALCIUM SCORE: ICD-10-CM

## 2024-07-25 DIAGNOSIS — Z87.891 FORMER CIGARETTE SMOKER: ICD-10-CM

## 2024-07-25 DIAGNOSIS — I25.10 CORONARY ARTERY DISEASE INVOLVING NATIVE CORONARY ARTERY OF NATIVE HEART, UNSPECIFIED WHETHER ANGINA PRESENT: ICD-10-CM

## 2024-07-25 DIAGNOSIS — Z95.1 S/P CORONARY ARTERY BYPASS GRAFT X 3: ICD-10-CM

## 2024-07-25 PROCEDURE — 93000 ELECTROCARDIOGRAM COMPLETE: CPT | Performed by: INTERNAL MEDICINE

## 2024-07-25 PROCEDURE — 1123F ACP DISCUSS/DSCN MKR DOCD: CPT | Performed by: INTERNAL MEDICINE

## 2024-07-25 PROCEDURE — 3079F DIAST BP 80-89 MM HG: CPT | Performed by: INTERNAL MEDICINE

## 2024-07-25 PROCEDURE — 1157F ADVNC CARE PLAN IN RCRD: CPT | Performed by: INTERNAL MEDICINE

## 2024-07-25 PROCEDURE — 1036F TOBACCO NON-USER: CPT | Performed by: INTERNAL MEDICINE

## 2024-07-25 PROCEDURE — 1159F MED LIST DOCD IN RCRD: CPT | Performed by: INTERNAL MEDICINE

## 2024-07-25 PROCEDURE — 99214 OFFICE O/P EST MOD 30 MIN: CPT | Performed by: INTERNAL MEDICINE

## 2024-07-25 PROCEDURE — 3075F SYST BP GE 130 - 139MM HG: CPT | Performed by: INTERNAL MEDICINE

## 2024-07-25 NOTE — PATIENT INSTRUCTIONS
Continue same medications/treatment.  Patient educated on proper medication use.  Patient educated on risk factor modification.  Please bring any lab results from other providers/physicians to your next appointment.    Please bring all medicines, vitamins, and herbal supplements with you when you come to the office.    Prescriptions will not be filled unless you are compliant with your follow up appointments or have a follow up appointment scheduled as per instruction of your physician. Refills should be requested at the time of your visit.    Follow up in November I, MICHELE URIAS RN, AM SCRIBING FOR AND IN THE PRESENCE OF DR. ASHISH DARLING, DO, FACC

## 2024-07-25 NOTE — PROGRESS NOTES
"  Patient:  Sylvester Carvalho  YOB: 1945  MRN: [id number]       Chief Complaint/Active Symptoms:       Sylvester Carvalho is a 78 y.o. male who returns today for cardiac follow-up.  Patient is 4 to 5 months out since open heart surgery.  He had paroxysmal A-fib which resolved.  He is off anticoagulation.  He is on usual blood pressure medicine beta-blocker ACE inhibitor as well.  Statin and aspirin.  He is doing well.  He is a has asymptomatic bradycardia.  He says he is always had a slow heartbeat.  Claims no other issues or problems at this time.          Objective:     Vitals:    07/25/24 1412   BP: 146/88   Pulse: (!) 46       Vitals:    07/25/24 1412   Weight: 84.1 kg (185 lb 8 oz)       Allergies:     Allergies   Allergen Reactions    Penicillins Swelling     \"Whole body swelled up\"          Medications:     Current Outpatient Medications   Medication Instructions    amLODIPine (NORVASC) 5 mg, oral, Daily    aspirin 81 mg, oral, Daily    atorvastatin (LIPITOR) 40 mg, oral, Daily    benazepriL-hydrochlorothiazide (Lotensin HCT) 20-25 mg tablet 1 tablet, oral, Daily    cholecalciferol (Vitamin D-3) 50 MCG (2000 UT) tablet 1 tablet, oral, Daily    metoprolol tartrate (LOPRESSOR) 25 mg, oral, 2 times daily    mv-min/FA/vit K/lutein/zeaxant (PRESERVISION AREDS 2 PLUS MV ORAL) 1 tablet, oral, Daily    pantoprazole (PROTONIX) 40 mg, oral, Daily before breakfast, Do not crush, chew, or split.       Physical Examination:   Constitutional:       Appearance: Healthy appearance. Not in distress.   Neck:      Vascular: No JVR. JVD normal.   Pulmonary:      Effort: Pulmonary effort is normal.      Breath sounds: Normal breath sounds. No wheezing. No rhonchi. No rales.   Chest:      Chest wall: Not tender to palpatation.   Cardiovascular:      PMI at left midclavicular line. Normal rate. Regular rhythm. Normal S1. Normal S2.       Murmurs: There is no murmur.      No gallop.  No click. No rub.   Pulses:     Intact distal " "pulses.   Edema:     Peripheral edema absent.   Abdominal:      General: Bowel sounds are normal.      Palpations: Abdomen is soft.      Tenderness: There is no abdominal tenderness.   Musculoskeletal: Normal range of motion.         General: No tenderness. Skin:     General: Skin is warm and dry.   Neurological:      General: No focal deficit present.      Mental Status: Alert and oriented to person, place and time.            Lab:     CBC:   Lab Results   Component Value Date    WBC 6.2 04/09/2024    RBC 4.50 04/09/2024    HGB 13.7 04/09/2024    HCT 43.7 04/09/2024     04/09/2024        CMP:    Lab Results   Component Value Date     04/18/2024    K 3.9 04/18/2024     04/18/2024    CO2 30 04/18/2024    BUN 16 04/18/2024    CREATININE 1.02 04/18/2024    GLUCOSE 93 04/18/2024    CALCIUM 9.5 04/18/2024       Magnesium:    Lab Results   Component Value Date    MG 2.08 02/28/2024       Lipid Profile:    Lab Results   Component Value Date    TRIG 190 (H) 01/31/2024    HDL 36.9 01/31/2024    LDLCALC 89 01/31/2024       TSH:    Lab Results   Component Value Date    TSH 3.19 02/20/2024       BNP:   No results found for: \"BNP\"     PT/INR:    Lab Results   Component Value Date    PROTIME 14.8 (H) 02/22/2024    INR 1.3 (H) 02/22/2024       HgBA1c:    Lab Results   Component Value Date    HGBA1C 5.6 02/20/2024       BMP:  Lab Results   Component Value Date     04/18/2024     04/09/2024     02/28/2024    K 3.9 04/18/2024    K 3.9 04/09/2024    K 4.1 02/28/2024     04/18/2024     (H) 04/09/2024     02/28/2024    CO2 30 04/18/2024    CO2 26 04/09/2024    CO2 23 02/28/2024    BUN 16 04/18/2024    BUN 17 04/09/2024    BUN 27 (H) 02/28/2024    CREATININE 1.02 04/18/2024    CREATININE 0.99 04/09/2024    CREATININE 0.98 02/28/2024       CBC:  Lab Results   Component Value Date    WBC 6.2 04/09/2024    WBC 7.1 02/28/2024    WBC 7.4 02/27/2024    RBC 4.50 04/09/2024    RBC 2.93 (L) " "02/28/2024    RBC 3.06 (L) 02/27/2024    HGB 13.7 04/09/2024    HGB 9.4 (L) 02/28/2024    HGB 9.8 (L) 02/27/2024    HCT 43.7 04/09/2024    HCT 27.5 (L) 02/28/2024    HCT 29.2 (L) 02/27/2024    MCV 97 04/09/2024    MCV 94 02/28/2024    MCV 95 02/27/2024    MCH 30.4 04/09/2024    MCH 32.1 02/28/2024    MCH 32.0 02/27/2024    MCHC 31.4 (L) 04/09/2024    MCHC 34.2 02/28/2024    MCHC 33.6 02/27/2024    RDW 13.4 04/09/2024    RDW 12.8 02/28/2024    RDW 12.9 02/27/2024     04/09/2024     02/28/2024     02/27/2024       Cardiac Enzymes:    No results found for: \"TROPHS\"    Hepatic Function Panel:    Lab Results   Component Value Date    ALKPHOS 60 02/20/2024    ALT 18 02/20/2024    AST 19 02/20/2024    PROT 7.0 02/20/2024    BILITOT 0.9 02/20/2024         Diagnostic Studies:     ECG 12 lead    Result Date: 4/10/2024  Sinus bradycardia with 1st degree AV block Nonspecific T wave abnormality Abnormal ECG When compared with ECG of 28-FEB-2024 07:04, Sinus rhythm has replaced Atrial flutter Vent. rate has decreased BY  36 BPM ST no longer elevated in Inferior leads Non-specific change in ST segment in Lateral leads Inverted T waves have replaced nonspecific T wave abnormality in Lateral leads See ED provider note for full interpretation and clinical correlation Confirmed by Ashley Fuentes ([id number]) on 4/10/2024 6:16:11 PM      EKG:   No results found for: \"EKG\"      Radiology:     No orders to display       Assessment/Plan:         Patient Active Problem List   Diagnosis    History of squamous cell carcinoma    Allergic rhinitis    Atypical nevus    Benign hypertension    Cataract, nuclear sclerotic, both eyes    Diverticulosis of colon    Enlarged prostate with lower urinary tract symptoms (LUTS)    Eustachian tube dysfunction    Exudative age-related macular degeneration of both eyes with active choroidal neovascularization (Multi)    GERD (gastroesophageal reflux disease)    HLD (hyperlipidemia)    " Hordeolum externum (stye)    Hyperbilirubinemia    Inflamed skin tag    Internal hordeolum of left eye    Macular degeneration    Both eyes affected by degenerative myopia with choroidal neovascularization    Osteoarthrosis    BMI 29.0-29.9,adult    Vitamin D deficiency    Pencilling of stools    Encounter for screening for malignant neoplasm of colon    Cyst of bone of right hand    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     Impression:  ASHD class II  Status post three-vessel coronary artery bypass surgery including LIMA to the LAD vein graft to the posterolateral branch and vein graft to the diagonal  Former smoker  Per lipidemia  Hypertension control  Paroxysmal atrial fibrillation perioperatively only  PLAN     Recommendation:  See me in 4 months  Continue current meds  Exercise will continue as before  Call if any problems or issues arise  Planned 1 year anniversary stress test and echo after next visit.

## 2024-07-29 ENCOUNTER — APPOINTMENT (OUTPATIENT)
Dept: OPHTHALMOLOGY | Facility: CLINIC | Age: 79
End: 2024-07-29
Payer: MEDICARE

## 2024-07-29 DIAGNOSIS — H35.3231 EXUDATIVE AGE-RELATED MACULAR DEGENERATION OF BOTH EYES WITH ACTIVE CHOROIDAL NEOVASCULARIZATION (MULTI): Primary | ICD-10-CM

## 2024-07-29 PROCEDURE — 92134 CPTRZ OPH DX IMG PST SGM RTA: CPT | Mod: BILATERAL PROCEDURE | Performed by: OPHTHALMOLOGY

## 2024-07-29 PROCEDURE — 67028 INJECTION EYE DRUG: CPT | Mod: BILATERAL PROCEDURE | Performed by: OPHTHALMOLOGY

## 2024-07-29 ASSESSMENT — ENCOUNTER SYMPTOMS
GASTROINTESTINAL NEGATIVE: 0
ENDOCRINE NEGATIVE: 0
CARDIOVASCULAR NEGATIVE: 0
PSYCHIATRIC NEGATIVE: 0
CONSTITUTIONAL NEGATIVE: 0
MUSCULOSKELETAL NEGATIVE: 0
HEMATOLOGIC/LYMPHATIC NEGATIVE: 0
ALLERGIC/IMMUNOLOGIC NEGATIVE: 0
RESPIRATORY NEGATIVE: 0
NEUROLOGICAL NEGATIVE: 0
EYES NEGATIVE: 1

## 2024-07-29 ASSESSMENT — VISUAL ACUITY
CORRECTION_TYPE: GLASSES
METHOD: SNELLEN - LINEAR
OD_CC: 20/25
OD_CC+: -1
OS_CC: 20/60

## 2024-07-29 ASSESSMENT — TONOMETRY
IOP_METHOD: GOLDMANN APPLANATION
OS_IOP_MMHG: DEF
OD_IOP_MMHG: DEF

## 2024-07-29 NOTE — PROGRESS NOTES
Assessment/Plan   Diagnoses and all orders for this visit:  Exudative age-related macular degeneration of both eyes with active choroidal neovascularization (Multi)  -     OCT, Retina - OU - Both Eyes  -     Intravitreal Injection, Pharmacologic Agent - OU - Both Eyes        Active exudative age-related macular degeneration (AMD)  OU    Myopic CNV both eyes, active fluid OU    Consider Eyela both eyes (OU)  Treatment options for CNV OU  discussed, including observation, anti-VEGF injections (including Avastin, Lucentis,   Eylea  Vabysmo and  Beovu ), and  laser. Recommend anti-VEGF injections. Eylea done OU today in a sterile manner with Betadine 5% for antisepsis.      FU 6w

## 2024-07-30 ASSESSMENT — EXTERNAL EXAM - LEFT EYE: OS_EXAM: NORMAL

## 2024-07-30 ASSESSMENT — SLIT LAMP EXAM - LIDS
COMMENTS: NORMAL
COMMENTS: NORMAL

## 2024-07-30 ASSESSMENT — EXTERNAL EXAM - RIGHT EYE: OD_EXAM: NORMAL

## 2024-07-30 ASSESSMENT — CUP TO DISC RATIO
OS_RATIO: 0.5
OD_RATIO: 0.5

## 2024-09-16 ENCOUNTER — LAB (OUTPATIENT)
Dept: LAB | Facility: LAB | Age: 79
End: 2024-09-16
Payer: MEDICARE

## 2024-09-16 DIAGNOSIS — I10 BENIGN HYPERTENSION: ICD-10-CM

## 2024-09-16 DIAGNOSIS — Z00.00 MEDICARE ANNUAL WELLNESS VISIT, SUBSEQUENT: ICD-10-CM

## 2024-09-16 DIAGNOSIS — K21.9 GASTROESOPHAGEAL REFLUX DISEASE WITHOUT ESOPHAGITIS: ICD-10-CM

## 2024-09-16 DIAGNOSIS — Z12.5 ENCOUNTER FOR SCREENING FOR MALIGNANT NEOPLASM OF PROSTATE: ICD-10-CM

## 2024-09-16 DIAGNOSIS — E55.9 VITAMIN D DEFICIENCY: ICD-10-CM

## 2024-09-16 DIAGNOSIS — E03.9 ACQUIRED HYPOTHYROIDISM: ICD-10-CM

## 2024-09-16 DIAGNOSIS — E78.2 MIXED HYPERLIPIDEMIA: ICD-10-CM

## 2024-09-16 DIAGNOSIS — E03.9 ACQUIRED HYPOTHYROIDISM: Primary | ICD-10-CM

## 2024-09-16 DIAGNOSIS — Z95.1 S/P CORONARY ARTERY BYPASS GRAFT X 3: ICD-10-CM

## 2024-09-16 LAB
ALBUMIN SERPL BCP-MCNC: 4.3 G/DL (ref 3.4–5)
ALP SERPL-CCNC: 66 U/L (ref 33–136)
ALT SERPL W P-5'-P-CCNC: 22 U/L (ref 10–52)
ANION GAP SERPL CALC-SCNC: 9 MMOL/L (ref 10–20)
AST SERPL W P-5'-P-CCNC: 20 U/L (ref 9–39)
BASOPHILS # BLD AUTO: 0.04 X10*3/UL (ref 0–0.1)
BASOPHILS NFR BLD AUTO: 0.7 %
BILIRUB SERPL-MCNC: 0.8 MG/DL (ref 0–1.2)
BUN SERPL-MCNC: 13 MG/DL (ref 6–23)
CALCIUM SERPL-MCNC: 9.8 MG/DL (ref 8.6–10.3)
CHLORIDE SERPL-SCNC: 105 MMOL/L (ref 98–107)
CHOLEST SERPL-MCNC: 130 MG/DL (ref 0–199)
CHOLESTEROL/HDL RATIO: 3.3
CO2 SERPL-SCNC: 33 MMOL/L (ref 21–32)
CREAT SERPL-MCNC: 1.06 MG/DL (ref 0.5–1.3)
EGFRCR SERPLBLD CKD-EPI 2021: 71 ML/MIN/1.73M*2
EOSINOPHIL # BLD AUTO: 0.25 X10*3/UL (ref 0–0.4)
EOSINOPHIL NFR BLD AUTO: 4.5 %
ERYTHROCYTE [DISTWIDTH] IN BLOOD BY AUTOMATED COUNT: 12.1 % (ref 11.5–14.5)
GLUCOSE SERPL-MCNC: 120 MG/DL (ref 74–99)
HCT VFR BLD AUTO: 45.2 % (ref 41–52)
HDLC SERPL-MCNC: 39.2 MG/DL
HGB BLD-MCNC: 14.9 G/DL (ref 13.5–17.5)
IMM GRANULOCYTES # BLD AUTO: 0.01 X10*3/UL (ref 0–0.5)
IMM GRANULOCYTES NFR BLD AUTO: 0.2 % (ref 0–0.9)
LDLC SERPL CALC-MCNC: 65 MG/DL
LYMPHOCYTES # BLD AUTO: 1.82 X10*3/UL (ref 0.8–3)
LYMPHOCYTES NFR BLD AUTO: 32.9 %
MCH RBC QN AUTO: 31.8 PG (ref 26–34)
MCHC RBC AUTO-ENTMCNC: 33 G/DL (ref 32–36)
MCV RBC AUTO: 96 FL (ref 80–100)
MONOCYTES # BLD AUTO: 0.6 X10*3/UL (ref 0.05–0.8)
MONOCYTES NFR BLD AUTO: 10.8 %
NEUTROPHILS # BLD AUTO: 2.82 X10*3/UL (ref 1.6–5.5)
NEUTROPHILS NFR BLD AUTO: 50.9 %
NON HDL CHOLESTEROL: 91 MG/DL (ref 0–149)
NRBC BLD-RTO: 0 /100 WBCS (ref 0–0)
PLATELET # BLD AUTO: 193 X10*3/UL (ref 150–450)
POTASSIUM SERPL-SCNC: 3.8 MMOL/L (ref 3.5–5.3)
PROT SERPL-MCNC: 7 G/DL (ref 6.4–8.2)
PSA SERPL-MCNC: 1.4 NG/ML
RBC # BLD AUTO: 4.69 X10*6/UL (ref 4.5–5.9)
SODIUM SERPL-SCNC: 143 MMOL/L (ref 136–145)
TRIGL SERPL-MCNC: 127 MG/DL (ref 0–149)
TSH SERPL-ACNC: 19.94 MIU/L (ref 0.44–3.98)
VLDL: 25 MG/DL (ref 0–40)
WBC # BLD AUTO: 5.5 X10*3/UL (ref 4.4–11.3)

## 2024-09-16 PROCEDURE — 86800 THYROGLOBULIN ANTIBODY: CPT

## 2024-09-16 PROCEDURE — 84443 ASSAY THYROID STIM HORMONE: CPT

## 2024-09-16 PROCEDURE — 36415 COLL VENOUS BLD VENIPUNCTURE: CPT

## 2024-09-16 PROCEDURE — 80053 COMPREHEN METABOLIC PANEL: CPT

## 2024-09-16 PROCEDURE — 86376 MICROSOMAL ANTIBODY EACH: CPT

## 2024-09-16 PROCEDURE — 80061 LIPID PANEL: CPT

## 2024-09-16 PROCEDURE — G0103 PSA SCREENING: HCPCS

## 2024-09-16 PROCEDURE — 85025 COMPLETE CBC W/AUTO DIFF WBC: CPT

## 2024-09-16 RX ORDER — LEVOTHYROXINE SODIUM 75 UG/1
75 TABLET ORAL DAILY
Qty: 90 TABLET | Refills: 0 | Status: SHIPPED | OUTPATIENT
Start: 2024-09-16 | End: 2024-12-15

## 2024-09-18 LAB
THYROGLOB AB SERPL-ACNC: <0.9 IU/ML (ref 0–4)
THYROPEROXIDASE AB SERPL-ACNC: 42 IU/ML

## 2024-10-02 ENCOUNTER — APPOINTMENT (OUTPATIENT)
Dept: OPHTHALMOLOGY | Facility: CLINIC | Age: 79
End: 2024-10-02
Payer: MEDICARE

## 2024-10-02 DIAGNOSIS — H44.2A3 BOTH EYES AFFECTED BY DEGENERATIVE MYOPIA WITH CHOROIDAL NEOVASCULARIZATION: ICD-10-CM

## 2024-10-02 DIAGNOSIS — H35.3231 EXUDATIVE AGE-RELATED MACULAR DEGENERATION OF BOTH EYES WITH ACTIVE CHOROIDAL NEOVASCULARIZATION: Primary | ICD-10-CM

## 2024-10-02 DIAGNOSIS — H44.23: Chronic | ICD-10-CM

## 2024-10-02 DIAGNOSIS — H35.053: Chronic | ICD-10-CM

## 2024-10-02 ASSESSMENT — SLIT LAMP EXAM - LIDS
COMMENTS: NORMAL
COMMENTS: NORMAL

## 2024-10-02 ASSESSMENT — EXTERNAL EXAM - RIGHT EYE: OD_EXAM: NORMAL

## 2024-10-02 ASSESSMENT — EXTERNAL EXAM - LEFT EYE: OS_EXAM: NORMAL

## 2024-10-02 ASSESSMENT — CUP TO DISC RATIO
OD_RATIO: 0.5
OS_RATIO: 0.5

## 2024-10-02 ASSESSMENT — ENCOUNTER SYMPTOMS
MUSCULOSKELETAL NEGATIVE: 0
RESPIRATORY NEGATIVE: 0
EYES NEGATIVE: 1
ALLERGIC/IMMUNOLOGIC NEGATIVE: 0
GASTROINTESTINAL NEGATIVE: 0
CARDIOVASCULAR NEGATIVE: 0
HEMATOLOGIC/LYMPHATIC NEGATIVE: 0
ENDOCRINE NEGATIVE: 0
PSYCHIATRIC NEGATIVE: 0
NEUROLOGICAL NEGATIVE: 0
CONSTITUTIONAL NEGATIVE: 0

## 2024-10-02 ASSESSMENT — VISUAL ACUITY
CORRECTION_TYPE: GLASSES
OS_CC: 20/60
METHOD: SNELLEN - LINEAR
OD_CC: 20/25

## 2024-10-02 ASSESSMENT — TONOMETRY
OD_IOP_MMHG: 16
OS_IOP_MMHG: 16
IOP_METHOD: GOLDMANN APPLANATION

## 2024-10-02 NOTE — PROGRESS NOTES
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, bilateral  Both eyes affected by degenerative myopia with choroidal neovascularization      DIAGNOSTIC PROCEDURE DONE    OCT DONE OD/OS            REASON FOR TEST: will help address and tailor  therapy by detecting subclinical CME SRF     Hi quality OCT  scans obtained  signal good    OCT OD - Normal Foveal Contour, subretinal fluid (SRF)  Edema, IS/OS Junction Normal  OCT OS - Normal Foveal Contour, subretinal fluid (SRF) No Edema, IS/OS Junction Normal    additional commnents:        Active exudative age-related macular degeneration (AMD)  OU    Myopic CNV both eyes, active fluid OU    Consider Eyela both eyes (OU)  Treatment options for CNV OU  discussed, including observation, anti-VEGF injections (including Avastin, Lucentis,   Eylea  Vabysmo and  Beovu ), and  laser. Recommend anti-VEGF injections. Eylea done OU today in a sterile manner with Betadine 5% for antisepsis.      FU 6w

## 2024-10-30 ENCOUNTER — LAB (OUTPATIENT)
Dept: LAB | Facility: LAB | Age: 79
End: 2024-10-30
Payer: MEDICARE

## 2024-10-30 DIAGNOSIS — E03.9 ACQUIRED HYPOTHYROIDISM: ICD-10-CM

## 2024-10-30 LAB — TSH SERPL-ACNC: 2.94 MIU/L (ref 0.44–3.98)

## 2024-10-30 PROCEDURE — 84443 ASSAY THYROID STIM HORMONE: CPT

## 2024-10-30 PROCEDURE — 36415 COLL VENOUS BLD VENIPUNCTURE: CPT

## 2024-10-30 RX ORDER — LEVOTHYROXINE SODIUM 75 UG/1
75 TABLET ORAL DAILY
Qty: 90 TABLET | Refills: 1 | Status: SHIPPED | OUTPATIENT
Start: 2024-10-30 | End: 2024-10-30 | Stop reason: SDUPTHER

## 2024-10-30 RX ORDER — LEVOTHYROXINE SODIUM 75 UG/1
75 TABLET ORAL DAILY
Qty: 90 TABLET | Refills: 1 | Status: SHIPPED | OUTPATIENT
Start: 2024-10-30 | End: 2025-04-28

## 2024-11-13 ENCOUNTER — APPOINTMENT (OUTPATIENT)
Dept: OPHTHALMOLOGY | Facility: CLINIC | Age: 79
End: 2024-11-13
Payer: MEDICARE

## 2024-11-13 DIAGNOSIS — H44.23: Chronic | ICD-10-CM

## 2024-11-13 DIAGNOSIS — H35.3231 EXUDATIVE AGE-RELATED MACULAR DEGENERATION OF BOTH EYES WITH ACTIVE CHOROIDAL NEOVASCULARIZATION: Primary | ICD-10-CM

## 2024-11-13 DIAGNOSIS — H35.053: Chronic | ICD-10-CM

## 2024-11-13 DIAGNOSIS — H35.3221 EXUDATIVE AGE-RELATED MACULAR DEGENERATION OF LEFT EYE WITH ACTIVE CHOROIDAL NEOVASCULARIZATION: ICD-10-CM

## 2024-11-13 DIAGNOSIS — H44.2A3 BOTH EYES AFFECTED BY DEGENERATIVE MYOPIA WITH CHOROIDAL NEOVASCULARIZATION: ICD-10-CM

## 2024-11-13 PROCEDURE — 1123F ACP DISCUSS/DSCN MKR DOCD: CPT | Performed by: OPHTHALMOLOGY

## 2024-11-13 PROCEDURE — 92134 CPTRZ OPH DX IMG PST SGM RTA: CPT | Performed by: OPHTHALMOLOGY

## 2024-11-13 PROCEDURE — 1036F TOBACCO NON-USER: CPT | Performed by: OPHTHALMOLOGY

## 2024-11-13 PROCEDURE — 1159F MED LIST DOCD IN RCRD: CPT | Performed by: OPHTHALMOLOGY

## 2024-11-13 PROCEDURE — 1157F ADVNC CARE PLAN IN RCRD: CPT | Performed by: OPHTHALMOLOGY

## 2024-11-13 ASSESSMENT — CONF VISUAL FIELD
OD_INFERIOR_TEMPORAL_RESTRICTION: 0
OD_SUPERIOR_TEMPORAL_RESTRICTION: 0
OS_NORMAL: 1
OS_INFERIOR_NASAL_RESTRICTION: 0
OS_INFERIOR_TEMPORAL_RESTRICTION: 0
OD_INFERIOR_NASAL_RESTRICTION: 0
OD_SUPERIOR_NASAL_RESTRICTION: 0
OD_NORMAL: 1
OS_SUPERIOR_TEMPORAL_RESTRICTION: 0
OS_SUPERIOR_NASAL_RESTRICTION: 0

## 2024-11-13 ASSESSMENT — TONOMETRY
IOP_METHOD: GOLDMANN APPLANATION
OS_IOP_MMHG: 16
OD_IOP_MMHG: 16

## 2024-11-13 ASSESSMENT — ENCOUNTER SYMPTOMS: EYES NEGATIVE: 1

## 2024-11-13 ASSESSMENT — SLIT LAMP EXAM - LIDS
COMMENTS: NORMAL
COMMENTS: NORMAL

## 2024-11-13 ASSESSMENT — CUP TO DISC RATIO
OD_RATIO: 0.5
OS_RATIO: 0.5

## 2024-11-13 ASSESSMENT — EXTERNAL EXAM - RIGHT EYE: OD_EXAM: NORMAL

## 2024-11-13 ASSESSMENT — VISUAL ACUITY
METHOD: SNELLEN - LINEAR
OD_CC: 20/25
OS_CC: 20/60

## 2024-11-13 ASSESSMENT — EXTERNAL EXAM - LEFT EYE: OS_EXAM: NORMAL

## 2024-11-13 NOTE — PROGRESS NOTES
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 Recommend anti-VEGF injections. Eylea done OS today in a sterile manner with Betadine 5% for antisepsis    1m

## 2024-11-25 ENCOUNTER — APPOINTMENT (OUTPATIENT)
Dept: CARDIOLOGY | Facility: CLINIC | Age: 79
End: 2024-11-25
Payer: MEDICARE

## 2024-11-25 VITALS
DIASTOLIC BLOOD PRESSURE: 70 MMHG | SYSTOLIC BLOOD PRESSURE: 112 MMHG | WEIGHT: 185.8 LBS | BODY MASS INDEX: 30.22 KG/M2 | HEART RATE: 56 BPM

## 2024-11-25 DIAGNOSIS — Z87.891 FORMER CIGARETTE SMOKER: ICD-10-CM

## 2024-11-25 DIAGNOSIS — E78.2 MIXED HYPERLIPIDEMIA: ICD-10-CM

## 2024-11-25 DIAGNOSIS — I10 BENIGN HYPERTENSION: ICD-10-CM

## 2024-11-25 DIAGNOSIS — Z95.1 S/P CORONARY ARTERY BYPASS GRAFT X 3: ICD-10-CM

## 2024-11-25 DIAGNOSIS — I25.10 CORONARY ARTERY DISEASE INVOLVING NATIVE CORONARY ARTERY OF NATIVE HEART, UNSPECIFIED WHETHER ANGINA PRESENT: ICD-10-CM

## 2024-11-25 DIAGNOSIS — Z82.49 FAMILY HISTORY OF ISCHEMIC HEART DISEASE: ICD-10-CM

## 2024-11-25 PROBLEM — E66.811 CLASS 1 OBESITY WITH BODY MASS INDEX (BMI) OF 30.0 TO 30.9 IN ADULT: Status: ACTIVE | Noted: 2024-11-25

## 2024-11-25 PROCEDURE — 3078F DIAST BP <80 MM HG: CPT | Performed by: INTERNAL MEDICINE

## 2024-11-25 PROCEDURE — 1123F ACP DISCUSS/DSCN MKR DOCD: CPT | Performed by: INTERNAL MEDICINE

## 2024-11-25 PROCEDURE — 3074F SYST BP LT 130 MM HG: CPT | Performed by: INTERNAL MEDICINE

## 2024-11-25 PROCEDURE — 1157F ADVNC CARE PLAN IN RCRD: CPT | Performed by: INTERNAL MEDICINE

## 2024-11-25 PROCEDURE — 1159F MED LIST DOCD IN RCRD: CPT | Performed by: INTERNAL MEDICINE

## 2024-11-25 PROCEDURE — 99214 OFFICE O/P EST MOD 30 MIN: CPT | Performed by: INTERNAL MEDICINE

## 2024-11-25 PROCEDURE — 1036F TOBACCO NON-USER: CPT | Performed by: INTERNAL MEDICINE

## 2024-11-25 NOTE — PATIENT INSTRUCTIONS
Follow up office visit in 1 year.  Continue same medications/treatment.  Patient educated on proper medication use.  Patient educated on risk factor modification.  Please bring any lab results from other providers / physicians to your next appointment.    Please bring all medicines, vitamins and herbal supplements with you when you come to the office.    Prescriptions will not be filled unless you are compliant with your follow up appointments or have a follow up  appointment scheduled as per instruction of your physician.  Refills should be requested at the time of  Your visit.    Kayla VALERA LPN, am scribing for and in the presence of Dr. Pedro Zelaya MD, FACC    
N/A

## 2024-11-25 NOTE — PROGRESS NOTES
CARDIOLOGY OFFICE VISIT      CHIEF COMPLAINT      HISTORY OF PRESENT ILLNESS  The patient states she has been doing very well.  He denies chest discomfort or symptoms of myocardial ischemia.  He has not used nitroglycerin.  He denies dyspnea exertion.  He denies palpitations and syncope.  He exercises regularly without any problems.  He denies any problem with his current medication.  I did go over his lipid profile with him from the middle of September this year.  It is good.  His LDL cholesterol is down 65.    Impression:  Coronary artery disease.  CABG, three-vessel, 2024 at [facility]  Hypertension  Hyperlipidemia  Former smoker  Obesity  Family history of CAD and myocardial infarction     Please excuse any errors in grammar or translation related to this dictation.  Voice recognition software was utilized to prepare this document.      Past Medical History  Past Medical History:   Diagnosis Date    Allergic     Arthritis     BPH (benign prostatic hyperplasia)     Cataract     Choroidal neovascularization     Coronary artery disease     COVID-19     VACCINATED    Degenerative myopia     Diverticulosis     Exudative age-related macular degeneration, left eye, stage unspecified 2018    Age-related macular degeneration, wet, left eye    GERD (gastroesophageal reflux disease)     Heart disease     History of squamous cell carcinoma of skin     Hyperlipidemia     Hypertension     Personal history of other endocrine, nutritional and metabolic disease 2018    History of high cholesterol    Visual impairment     Wears glasses        Social History  Social History     Tobacco Use    Smoking status: Former     Current packs/day: 0.00     Average packs/day: 1 pack/day for 36.0 years (36.0 ttl pk-yrs)     Types: Cigarettes     Start date: 1964     Quit date: 2000     Years since quittin.9    Smokeless tobacco: Never    Tobacco comments:     I quit smoking many many times over the years so i  "didnt smoke continually for 36 years notes above   Vaping Use    Vaping status: Never Used   Substance Use Topics    Alcohol use: Yes     Alcohol/week: 2.0 standard drinks of alcohol     Types: 2 Standard drinks or equivalent per week     Comment: Occasional and always socially    Drug use: Never       Family History     Family History   Problem Relation Name Age of Onset    Other (stroke syndrome) Mother Marcia     Stroke Mother Marcia     Lung cancer Father      Coronary artery disease Brother      No Known Problems Other      Heart disease Brother Joao     Macular degeneration Neg Hx          Allergies:  Allergies   Allergen Reactions    Penicillins Swelling     \"Whole body swelled up\"        Outpatient Medications:  Current Outpatient Medications   Medication Instructions    amLODIPine (NORVASC) 5 mg, oral, Daily    aspirin 81 mg, oral, Daily    atorvastatin (LIPITOR) 40 mg, oral, Daily    benazepriL-hydrochlorothiazide (Lotensin HCT) 20-25 mg tablet 1 tablet, oral, Daily    cholecalciferol (Vitamin D-3) 50 MCG (2000 UT) tablet 1 tablet, Daily    levothyroxine (SYNTHROID, LEVOXYL) 75 mcg, oral, Daily, Take on an empty stomach at the same time each day, either 30 to 60 minutes prior to breakfast    metoprolol tartrate (LOPRESSOR) 25 mg, oral, 2 times daily    mv-min/FA/vit K/lutein/zeaxant (PRESERVISION AREDS 2 PLUS MV ORAL) 1 tablet, Daily    pantoprazole (PROTONIX) 40 mg, oral, Daily before breakfast, Do not crush, chew, or split.          REVIEW OF SYSTEMS  Review of Systems   All other systems reviewed and are negative.        VITALS  Vitals:    11/25/24 1510   BP: 112/70   Pulse: 56       PHYSICAL EXAM  Constitutional:       Appearance: Healthy appearance. Not in distress.   Eyes:      Conjunctiva/sclera: Conjunctivae normal.      Pupils: Pupils are equal, round, and reactive to light.   Neck:      Vascular: No JVR. JVD normal.   Pulmonary:      Effort: Pulmonary effort is normal.      Breath sounds: " Normal breath sounds. No wheezing. No rhonchi. No rales.   Chest:      Chest wall: Not tender to palpatation.   Cardiovascular:      PMI at left midclavicular line. Normal rate. Regular rhythm. Normal S1. Normal S2.       Murmurs: There is no murmur.      No gallop.  No click. No rub.   Pulses:     Intact distal pulses.   Edema:     Peripheral edema absent.   Abdominal:      Tenderness: There is no abdominal tenderness.   Musculoskeletal: Normal range of motion.         General: No tenderness.      Cervical back: Normal range of motion. Skin:     General: Skin is warm and dry.   Neurological:      General: No focal deficit present.      Mental Status: Alert and oriented to person, place and time.           ASSESSMENT AND PLAN  Diagnoses and all orders for this visit:  Coronary artery disease involving native coronary artery of native heart, unspecified whether angina present  Benign hypertension  Mixed hyperlipidemia  Former cigarette smoker  Family history of ischemic heart disease  S/P coronary artery bypass graft x 3      [unfilled]

## 2024-12-16 ENCOUNTER — APPOINTMENT (OUTPATIENT)
Dept: OPHTHALMOLOGY | Facility: CLINIC | Age: 79
End: 2024-12-16
Payer: MEDICARE

## 2024-12-16 DIAGNOSIS — H35.3231 EXUDATIVE AGE-RELATED MACULAR DEGENERATION OF BOTH EYES WITH ACTIVE CHOROIDAL NEOVASCULARIZATION: Primary | ICD-10-CM

## 2024-12-16 PROCEDURE — 92134 CPTRZ OPH DX IMG PST SGM RTA: CPT | Mod: BILATERAL PROCEDURE | Performed by: OPHTHALMOLOGY

## 2024-12-16 PROCEDURE — 67028 INJECTION EYE DRUG: CPT | Mod: BILATERAL PROCEDURE | Performed by: OPHTHALMOLOGY

## 2024-12-16 ASSESSMENT — VISUAL ACUITY
OD_CC: 20/25
OS_CC: 20/50
CORRECTION_TYPE: GLASSES
OD_CC+: +2
METHOD: SNELLEN - LINEAR
OS_CC+: -2

## 2024-12-16 ASSESSMENT — ENCOUNTER SYMPTOMS
MUSCULOSKELETAL NEGATIVE: 0
HEMATOLOGIC/LYMPHATIC NEGATIVE: 0
RESPIRATORY NEGATIVE: 0
CONSTITUTIONAL NEGATIVE: 0
GASTROINTESTINAL NEGATIVE: 0
EYES NEGATIVE: 1
PSYCHIATRIC NEGATIVE: 0
CARDIOVASCULAR NEGATIVE: 0
ENDOCRINE NEGATIVE: 0
NEUROLOGICAL NEGATIVE: 0
ALLERGIC/IMMUNOLOGIC NEGATIVE: 0

## 2024-12-16 ASSESSMENT — TONOMETRY
OS_IOP_MMHG: 16
OD_IOP_MMHG: 16
IOP_METHOD: GOLDMANN APPLANATION

## 2024-12-16 NOTE — PROGRESS NOTES
Assessment/Plan   Diagnoses and all orders for this visit:  Exudative age-related macular degeneration of both eyes with active choroidal neovascularization  -     OCT, Retina - OU - Both Eyes  -     Intravitreal Injection, Pharmacologic Agent - OU - Both Eyes      DIAGNOSTIC PROCEDURE DONE    OCT DONE OD/OS            REASON FOR TEST: will help address and tailor  therapy by detecting subclinical CME SRF     Hi quality OCT  scans obtained  signal good    OCT OD - Normal Foveal Contour, subretinal fluid (SRF)  Edema, IS/OS Junction Normal  OCT OS - Normal Foveal Contour, subretinal fluid (SRF) No Edema, IS/OS Junction Normal    additional commnents:        Active exudative age-related macular degeneration (AMD)  OU    Myopic CNV both eyes, active fluid OU    Consider Eyela both eyes (OU)  Treatment options for CNV OU  discussed, including observation, anti-VEGF injections (including Avastin, Lucentis,   Eylea  Vabysmo and  Beovu ), and  laser. Recommend anti-VEGF injections. Eylea done OU today in a sterile manner with Betadine 5% for antisepsis.      FU 6w

## 2025-01-06 ENCOUNTER — APPOINTMENT (OUTPATIENT)
Dept: PRIMARY CARE | Facility: CLINIC | Age: 80
End: 2025-01-06
Payer: MEDICARE

## 2025-01-06 VITALS
BODY MASS INDEX: 29.89 KG/M2 | HEIGHT: 66 IN | HEART RATE: 50 BPM | SYSTOLIC BLOOD PRESSURE: 116 MMHG | WEIGHT: 186 LBS | DIASTOLIC BLOOD PRESSURE: 70 MMHG | TEMPERATURE: 98 F

## 2025-01-06 DIAGNOSIS — E78.2 MIXED HYPERLIPIDEMIA: ICD-10-CM

## 2025-01-06 DIAGNOSIS — I48.92 ATRIAL FLUTTER, UNSPECIFIED TYPE (MULTI): ICD-10-CM

## 2025-01-06 DIAGNOSIS — E55.9 VITAMIN D DEFICIENCY: ICD-10-CM

## 2025-01-06 DIAGNOSIS — I10 BENIGN HYPERTENSION: Primary | ICD-10-CM

## 2025-01-06 DIAGNOSIS — Z12.5 ENCOUNTER FOR SCREENING FOR MALIGNANT NEOPLASM OF PROSTATE: ICD-10-CM

## 2025-01-06 DIAGNOSIS — N40.1 BENIGN PROSTATIC HYPERPLASIA WITH URINARY FREQUENCY: ICD-10-CM

## 2025-01-06 DIAGNOSIS — Z00.00 WELLNESS EXAMINATION: ICD-10-CM

## 2025-01-06 DIAGNOSIS — R35.0 BENIGN PROSTATIC HYPERPLASIA WITH URINARY FREQUENCY: ICD-10-CM

## 2025-01-06 DIAGNOSIS — H35.3231 EXUDATIVE AGE-RELATED MACULAR DEGENERATION OF BOTH EYES WITH ACTIVE CHOROIDAL NEOVASCULARIZATION: ICD-10-CM

## 2025-01-06 DIAGNOSIS — Z95.1 S/P CORONARY ARTERY BYPASS GRAFT X 3: ICD-10-CM

## 2025-01-06 DIAGNOSIS — K21.9 GASTROESOPHAGEAL REFLUX DISEASE WITHOUT ESOPHAGITIS: ICD-10-CM

## 2025-01-06 DIAGNOSIS — E03.9 ACQUIRED HYPOTHYROIDISM: ICD-10-CM

## 2025-01-06 DIAGNOSIS — I74.4 EMBOLISM AND THROMBOSIS OF ARTERIES OF EXTREMITIES, UNSPECIFIED: ICD-10-CM

## 2025-01-06 DIAGNOSIS — Z87.891 FORMER CIGARETTE SMOKER: ICD-10-CM

## 2025-01-06 PROCEDURE — 1123F ACP DISCUSS/DSCN MKR DOCD: CPT | Performed by: FAMILY MEDICINE

## 2025-01-06 PROCEDURE — G2211 COMPLEX E/M VISIT ADD ON: HCPCS | Performed by: FAMILY MEDICINE

## 2025-01-06 PROCEDURE — 1036F TOBACCO NON-USER: CPT | Performed by: FAMILY MEDICINE

## 2025-01-06 PROCEDURE — 1159F MED LIST DOCD IN RCRD: CPT | Performed by: FAMILY MEDICINE

## 2025-01-06 PROCEDURE — 1157F ADVNC CARE PLAN IN RCRD: CPT | Performed by: FAMILY MEDICINE

## 2025-01-06 PROCEDURE — 99214 OFFICE O/P EST MOD 30 MIN: CPT | Performed by: FAMILY MEDICINE

## 2025-01-06 PROCEDURE — 3074F SYST BP LT 130 MM HG: CPT | Performed by: FAMILY MEDICINE

## 2025-01-06 PROCEDURE — 3078F DIAST BP <80 MM HG: CPT | Performed by: FAMILY MEDICINE

## 2025-01-06 RX ORDER — LEVOTHYROXINE SODIUM 75 UG/1
75 TABLET ORAL DAILY
Qty: 90 TABLET | Refills: 1 | Status: SHIPPED | OUTPATIENT
Start: 2025-01-06 | End: 2025-07-05

## 2025-01-06 RX ORDER — PANTOPRAZOLE SODIUM 40 MG/1
40 TABLET, DELAYED RELEASE ORAL
Qty: 90 TABLET | Refills: 3 | Status: SHIPPED | OUTPATIENT
Start: 2025-01-06

## 2025-01-06 NOTE — PROGRESS NOTES
Patient is here 6-month follow-up has been doing good.  Blood pressure stable no chest pain or palpitations.  Acid reflux also stable.  He has been working on some weight loss but has not been tracking his calories at all.    REVIEW OF SYSTEMS: 12 systems negative except for those mentioned in the HPI.    PHYSICAL EXAMINATION:   Constitutional: The patient is alert, in no acute  distress.  Eyes: Extraocular movements are intact.   ENT: external ear canals patent  Neck: no  JVD.  Heart: no JVD  Lungs: Normal respiration without stridor or nasal flaring   Psychiatric: Good judgment and insight. Normal affect and mood.  Skin: no rashes or lesions    Assessment:  per EMR    Plan:  Calculated out that the patient 1600 heide should be his maximum.  He will work on this.  Blood pressure stable.  Cholesterol thyroid thyroid will be rechecked as he had had some major fluctuations as far as that goes and we will recheck that along with CBC CMP.  Otherwise follow-up in 6 months for annual wellness    This dictation was created using Dragon dictation and may contain errors

## 2025-01-16 DIAGNOSIS — E78.2 MIXED HYPERLIPIDEMIA: ICD-10-CM

## 2025-01-17 RX ORDER — ATORVASTATIN CALCIUM 40 MG/1
40 TABLET, FILM COATED ORAL DAILY
Qty: 90 TABLET | Refills: 3 | Status: SHIPPED | OUTPATIENT
Start: 2025-01-17 | End: 2026-01-17

## 2025-01-17 NOTE — TELEPHONE ENCOUNTER
Received request for prescription refills for patient.   Patient follows with Dr. Lin    Request is for Lipitor  Is patient currently on medication yes    Last OV 11/25/2024  Next OV 11/26/2025    Pended for signing and sent to provider

## 2025-01-20 DIAGNOSIS — R03.0 ELEVATED BLOOD PRESSURE READING: ICD-10-CM

## 2025-01-20 RX ORDER — BENAZEPRIL HYDROCHLORIDE AND HYDROCHLOROTHIAZIDE 20; 25 MG/1; MG/1
1 TABLET ORAL DAILY
Qty: 90 TABLET | Refills: 0 | Status: SHIPPED | OUTPATIENT
Start: 2025-01-20 | End: 2025-04-20

## 2025-01-20 RX ORDER — AMLODIPINE BESYLATE 5 MG/1
5 TABLET ORAL DAILY
Qty: 90 TABLET | Refills: 0 | Status: SHIPPED | OUTPATIENT
Start: 2025-01-20 | End: 2025-04-20

## 2025-01-29 LAB
ALBUMIN SERPL-MCNC: 4.3 G/DL (ref 3.6–5.1)
ALP SERPL-CCNC: 58 U/L (ref 35–144)
ALT SERPL-CCNC: 18 U/L (ref 9–46)
ANION GAP SERPL CALCULATED.4IONS-SCNC: 9 MMOL/L (CALC) (ref 7–17)
AST SERPL-CCNC: 22 U/L (ref 10–35)
BASOPHILS # BLD AUTO: 29 CELLS/UL (ref 0–200)
BASOPHILS NFR BLD AUTO: 0.7 %
BILIRUB SERPL-MCNC: 1.2 MG/DL (ref 0.2–1.2)
BUN SERPL-MCNC: 16 MG/DL (ref 7–25)
CALCIUM SERPL-MCNC: 10 MG/DL (ref 8.6–10.3)
CHLORIDE SERPL-SCNC: 100 MMOL/L (ref 98–110)
CO2 SERPL-SCNC: 28 MMOL/L (ref 20–32)
CREAT SERPL-MCNC: 1.1 MG/DL (ref 0.7–1.22)
EGFRCR SERPLBLD CKD-EPI 2021: 68 ML/MIN/1.73M2
EOSINOPHIL # BLD AUTO: 130 CELLS/UL (ref 15–500)
EOSINOPHIL NFR BLD AUTO: 3.1 %
ERYTHROCYTE [DISTWIDTH] IN BLOOD BY AUTOMATED COUNT: 12.9 % (ref 11–15)
GLUCOSE SERPL-MCNC: 115 MG/DL (ref 65–99)
HCT VFR BLD AUTO: 46.4 % (ref 38.5–50)
HGB BLD-MCNC: 15.2 G/DL (ref 13.2–17.1)
LYMPHOCYTES # BLD AUTO: 1025 CELLS/UL (ref 850–3900)
LYMPHOCYTES NFR BLD AUTO: 24.4 %
MCH RBC QN AUTO: 30.8 PG (ref 27–33)
MCHC RBC AUTO-ENTMCNC: 32.8 G/DL (ref 32–36)
MCV RBC AUTO: 94.1 FL (ref 80–100)
MONOCYTES # BLD AUTO: 554 CELLS/UL (ref 200–950)
MONOCYTES NFR BLD AUTO: 13.2 %
NEUTROPHILS # BLD AUTO: 2461 CELLS/UL (ref 1500–7800)
NEUTROPHILS NFR BLD AUTO: 58.6 %
PLATELET # BLD AUTO: 160 THOUSAND/UL (ref 140–400)
PMV BLD REES-ECKER: 11.2 FL (ref 7.5–12.5)
POTASSIUM SERPL-SCNC: 4.2 MMOL/L (ref 3.5–5.3)
PROT SERPL-MCNC: 6.8 G/DL (ref 6.1–8.1)
PSA SERPL-MCNC: 1.58 NG/ML
RBC # BLD AUTO: 4.93 MILLION/UL (ref 4.2–5.8)
SODIUM SERPL-SCNC: 137 MMOL/L (ref 135–146)
TSH SERPL-ACNC: 1.43 MIU/L (ref 0.4–4.5)
WBC # BLD AUTO: 4.2 THOUSAND/UL (ref 3.8–10.8)

## 2025-02-05 ENCOUNTER — APPOINTMENT (OUTPATIENT)
Dept: OPHTHALMOLOGY | Facility: CLINIC | Age: 80
End: 2025-02-05
Payer: MEDICARE

## 2025-02-05 DIAGNOSIS — H35.3231 EXUDATIVE AGE-RELATED MACULAR DEGENERATION OF BOTH EYES WITH ACTIVE CHOROIDAL NEOVASCULARIZATION: Primary | ICD-10-CM

## 2025-02-05 ASSESSMENT — CONF VISUAL FIELD
OD_SUPERIOR_NASAL_RESTRICTION: 0
OD_SUPERIOR_TEMPORAL_RESTRICTION: 0
OD_INFERIOR_TEMPORAL_RESTRICTION: 0
OS_SUPERIOR_NASAL_RESTRICTION: 0
OS_SUPERIOR_TEMPORAL_RESTRICTION: 0
OS_INFERIOR_TEMPORAL_RESTRICTION: 0
OS_INFERIOR_NASAL_RESTRICTION: 0
OD_NORMAL: 1
OD_INFERIOR_NASAL_RESTRICTION: 0
OS_NORMAL: 1

## 2025-02-05 ASSESSMENT — CUP TO DISC RATIO
OS_RATIO: 0.5
OD_RATIO: 0.5

## 2025-02-05 ASSESSMENT — SLIT LAMP EXAM - LIDS
COMMENTS: NORMAL
COMMENTS: NORMAL

## 2025-02-05 ASSESSMENT — EXTERNAL EXAM - LEFT EYE: OS_EXAM: NORMAL

## 2025-02-05 ASSESSMENT — TONOMETRY
IOP_METHOD: GOLDMANN APPLANATION
OD_IOP_MMHG: 16
OS_IOP_MMHG: 15

## 2025-02-05 ASSESSMENT — VISUAL ACUITY
METHOD: SNELLEN - LINEAR
OD_CC: 20/25
OS_CC: 20/50
CORRECTION_TYPE: GLASSES

## 2025-02-05 ASSESSMENT — ENCOUNTER SYMPTOMS: EYES NEGATIVE: 1

## 2025-02-05 ASSESSMENT — EXTERNAL EXAM - RIGHT EYE: OD_EXAM: NORMAL

## 2025-02-05 NOTE — PROGRESS NOTES
Assessment/Plan   Diagnoses and all orders for this visit:  Exudative age-related macular degeneration of both eyes with active choroidal neovascularization  -     OCT, Retina - OU - Both Eyes      DIAGNOSTIC PROCEDURE DONE    OCT DONE OD/OS            REASON FOR TEST: will help address and tailor  therapy by detecting subclinical CME SRF     Hi quality OCT  scans obtained  signal good    OCT OD - Normal Foveal Contour, subretinal fluid (SRF)  Edema, IS/OS Junction Normal  OCT OS - Normal Foveal Contour, subretinal fluid (SRF) No Edema, IS/OS Junction Normal    additional commnents:        Active exudative age-related macular degeneration (AMD)  OU    Myopic CNV both eyes, active fluid OU    Consider Eyela both eyes (OU)  Treatment options for CNV OU  discussed, including observation, anti-VEGF injections (including Avastin, Lucentis,   Eylea  Vabysmo and  Beovu ), and  laser. Recommend anti-VEGF injections. Eylea done OU today in a sterile manner with Betadine 5% for antisepsis.      FU 6w

## 2025-03-17 ENCOUNTER — APPOINTMENT (OUTPATIENT)
Dept: OPHTHALMOLOGY | Facility: CLINIC | Age: 80
End: 2025-03-17
Payer: MEDICARE

## 2025-03-19 DIAGNOSIS — I25.10 CORONARY ARTERY DISEASE INVOLVING NATIVE CORONARY ARTERY OF NATIVE HEART, UNSPECIFIED WHETHER ANGINA PRESENT: ICD-10-CM

## 2025-03-19 DIAGNOSIS — I10 BENIGN HYPERTENSION: ICD-10-CM

## 2025-03-19 DIAGNOSIS — I48.0 PAROXYSMAL ATRIAL FIBRILLATION (MULTI): ICD-10-CM

## 2025-03-20 RX ORDER — METOPROLOL TARTRATE 25 MG/1
25 TABLET, FILM COATED ORAL 2 TIMES DAILY
Qty: 180 TABLET | Refills: 3 | Status: SHIPPED | OUTPATIENT
Start: 2025-03-20 | End: 2026-03-20

## 2025-03-20 NOTE — TELEPHONE ENCOUNTER
Received request for prescription refills for patient.   Patient follows with Dr. Zelaya    Request is for Lopressor  Is patient currently on medication yes    Last OV 11/25/2024  Next OV 11/26/2025    Pended for signing and sent to provider

## 2025-03-26 ENCOUNTER — APPOINTMENT (OUTPATIENT)
Dept: OPHTHALMOLOGY | Facility: CLINIC | Age: 80
End: 2025-03-26
Payer: MEDICARE

## 2025-03-26 DIAGNOSIS — H35.3231 EXUDATIVE AGE-RELATED MACULAR DEGENERATION OF BOTH EYES WITH ACTIVE CHOROIDAL NEOVASCULARIZATION: Primary | ICD-10-CM

## 2025-03-26 PROCEDURE — 67028 INJECTION EYE DRUG: CPT | Mod: BILATERAL PROCEDURE | Performed by: OPHTHALMOLOGY

## 2025-03-26 PROCEDURE — 92134 CPTRZ OPH DX IMG PST SGM RTA: CPT | Mod: BILATERAL PROCEDURE | Performed by: OPHTHALMOLOGY

## 2025-03-26 ASSESSMENT — TONOMETRY
IOP_METHOD: GOLDMANN APPLANATION
OS_IOP_MMHG: 14
OD_IOP_MMHG: 14

## 2025-03-26 ASSESSMENT — VISUAL ACUITY
OD_CC: 20/25
CORRECTION_TYPE: GLASSES
OS_CC: 20/50
METHOD: SNELLEN - LINEAR

## 2025-03-26 ASSESSMENT — CUP TO DISC RATIO
OS_RATIO: 0.5
OD_RATIO: 0.5

## 2025-03-26 ASSESSMENT — EXTERNAL EXAM - LEFT EYE: OS_EXAM: NORMAL

## 2025-03-26 ASSESSMENT — SLIT LAMP EXAM - LIDS
COMMENTS: NORMAL
COMMENTS: NORMAL

## 2025-03-26 ASSESSMENT — EXTERNAL EXAM - RIGHT EYE: OD_EXAM: NORMAL

## 2025-05-05 ENCOUNTER — APPOINTMENT (OUTPATIENT)
Dept: OPHTHALMOLOGY | Facility: CLINIC | Age: 80
End: 2025-05-05
Payer: MEDICARE

## 2025-05-05 DIAGNOSIS — H35.3231 EXUDATIVE AGE-RELATED MACULAR DEGENERATION OF BOTH EYES WITH ACTIVE CHOROIDAL NEOVASCULARIZATION: Primary | ICD-10-CM

## 2025-05-05 PROCEDURE — 92134 CPTRZ OPH DX IMG PST SGM RTA: CPT | Mod: BILATERAL PROCEDURE | Performed by: OPHTHALMOLOGY

## 2025-05-05 PROCEDURE — 67028 INJECTION EYE DRUG: CPT | Mod: BILATERAL PROCEDURE | Performed by: OPHTHALMOLOGY

## 2025-05-05 ASSESSMENT — SLIT LAMP EXAM - LIDS
COMMENTS: NORMAL
COMMENTS: NORMAL

## 2025-05-05 ASSESSMENT — VISUAL ACUITY
OS_CC: 20/50
METHOD: SNELLEN - LINEAR
OD_CC: 20/25

## 2025-05-05 ASSESSMENT — EXTERNAL EXAM - LEFT EYE: OS_EXAM: NORMAL

## 2025-05-05 ASSESSMENT — EXTERNAL EXAM - RIGHT EYE: OD_EXAM: NORMAL

## 2025-05-05 ASSESSMENT — TONOMETRY
IOP_METHOD: GOLDMANN APPLANATION
OS_IOP_MMHG: 16
OD_IOP_MMHG: 16

## 2025-05-05 ASSESSMENT — CUP TO DISC RATIO
OS_RATIO: 0.5
OD_RATIO: 0.5

## 2025-05-07 ENCOUNTER — APPOINTMENT (OUTPATIENT)
Dept: OPHTHALMOLOGY | Facility: CLINIC | Age: 80
End: 2025-05-07
Payer: MEDICARE

## 2025-05-16 ENCOUNTER — TELEPHONE (OUTPATIENT)
Dept: PRIMARY CARE | Facility: CLINIC | Age: 80
End: 2025-05-16
Payer: MEDICARE

## 2025-05-16 DIAGNOSIS — T75.3XXD SEA SICKNESS, SUBSEQUENT ENCOUNTER: Primary | ICD-10-CM

## 2025-05-16 RX ORDER — SCOPOLAMINE 1 MG/3D
1 PATCH, EXTENDED RELEASE TRANSDERMAL
Qty: 10 PATCH | Refills: 0 | Status: SHIPPED | OUTPATIENT
Start: 2025-05-16 | End: 2025-07-15

## 2025-06-11 ENCOUNTER — APPOINTMENT (OUTPATIENT)
Dept: OPHTHALMOLOGY | Facility: CLINIC | Age: 80
End: 2025-06-11
Payer: MEDICARE

## 2025-06-11 DIAGNOSIS — H35.3231 EXUDATIVE AGE-RELATED MACULAR DEGENERATION OF BOTH EYES WITH ACTIVE CHOROIDAL NEOVASCULARIZATION: Primary | ICD-10-CM

## 2025-06-11 PROCEDURE — 92134 CPTRZ OPH DX IMG PST SGM RTA: CPT | Mod: BILATERAL PROCEDURE | Performed by: OPHTHALMOLOGY

## 2025-06-11 PROCEDURE — 67028 INJECTION EYE DRUG: CPT | Mod: BILATERAL PROCEDURE | Performed by: OPHTHALMOLOGY

## 2025-06-11 ASSESSMENT — VISUAL ACUITY
OD_CC: 20/25-2
OS_CC: 20/40-2
METHOD: SNELLEN - LINEAR

## 2025-06-11 ASSESSMENT — SLIT LAMP EXAM - LIDS
COMMENTS: NORMAL
COMMENTS: NORMAL

## 2025-06-11 ASSESSMENT — ENCOUNTER SYMPTOMS
CONSTITUTIONAL NEGATIVE: 0
MUSCULOSKELETAL NEGATIVE: 0
EYES NEGATIVE: 1
GASTROINTESTINAL NEGATIVE: 0
ALLERGIC/IMMUNOLOGIC NEGATIVE: 0
HEMATOLOGIC/LYMPHATIC NEGATIVE: 0
ENDOCRINE NEGATIVE: 0
RESPIRATORY NEGATIVE: 0
PSYCHIATRIC NEGATIVE: 0
CARDIOVASCULAR NEGATIVE: 0
NEUROLOGICAL NEGATIVE: 0

## 2025-06-11 ASSESSMENT — TONOMETRY
IOP_METHOD: GOLDMANN APPLANATION
OS_IOP_MMHG: 16
OD_IOP_MMHG: 16

## 2025-06-11 ASSESSMENT — EXTERNAL EXAM - RIGHT EYE: OD_EXAM: NORMAL

## 2025-06-11 ASSESSMENT — CUP TO DISC RATIO
OD_RATIO: 0.5
OS_RATIO: 0.5

## 2025-06-11 ASSESSMENT — EXTERNAL EXAM - LEFT EYE: OS_EXAM: NORMAL

## 2025-06-11 NOTE — PROGRESS NOTES
Assessment/Plan   Diagnoses and all orders for this visit:  Exudative age-related macular degeneration of both eyes with active choroidal neovascularization  -     OCT, Retina - OU - Both Eyes      Last injection: Eylea 8mg OU ( 5/5/25)  DIAGNOSTIC PROCEDURE DONE  OCT DONE OD/OS  REASON FOR TEST: will help address and tailor  therapy by detecting subclinical CME SRF     Hi quality OCT  scans obtained  signal good    OCT OD - Normal Foveal Contour, subretinal fluid (SRF)  Edema almost resolved, IS/OS Junction Normal  OCT OS - Normal Foveal Contour, persistent subretinal fluid (SRF) No Edema, IS/OS Junction Normal    additional commnents: improvement        Active exudative age-related macular degeneration (AMD)  OU    Myopic CNV both eyes, active fluid OU    Consider  both eyes (OU)  Treatment options for CNV OU  discussed, including observation, anti-VEGF injections (including Avastin, Lucentis,   Eylea  Vabysmo and  Beovu ), and  laser. Recommend anti-VEGF injections. Vabysmo done OU today in a sterile manner with Betadine 5% for antisepsis.      FU 6w

## 2025-07-07 ENCOUNTER — APPOINTMENT (OUTPATIENT)
Dept: PRIMARY CARE | Facility: CLINIC | Age: 80
End: 2025-07-07
Payer: MEDICARE

## 2025-07-08 ENCOUNTER — APPOINTMENT (OUTPATIENT)
Dept: PRIMARY CARE | Facility: CLINIC | Age: 80
End: 2025-07-08
Payer: MEDICARE

## 2025-07-08 VITALS
DIASTOLIC BLOOD PRESSURE: 78 MMHG | WEIGHT: 152 LBS | HEART RATE: 53 BPM | TEMPERATURE: 97.3 F | BODY MASS INDEX: 24.43 KG/M2 | SYSTOLIC BLOOD PRESSURE: 116 MMHG | HEIGHT: 66 IN

## 2025-07-08 DIAGNOSIS — K21.9 GASTROESOPHAGEAL REFLUX DISEASE WITHOUT ESOPHAGITIS: ICD-10-CM

## 2025-07-08 DIAGNOSIS — R03.0 ELEVATED BLOOD PRESSURE READING: ICD-10-CM

## 2025-07-08 DIAGNOSIS — I10 BENIGN HYPERTENSION: ICD-10-CM

## 2025-07-08 DIAGNOSIS — Z12.5 ENCOUNTER FOR SCREENING FOR MALIGNANT NEOPLASM OF PROSTATE: ICD-10-CM

## 2025-07-08 DIAGNOSIS — Z87.891 FORMER CIGARETTE SMOKER: ICD-10-CM

## 2025-07-08 DIAGNOSIS — E78.2 MIXED HYPERLIPIDEMIA: ICD-10-CM

## 2025-07-08 DIAGNOSIS — E03.9 ACQUIRED HYPOTHYROIDISM: ICD-10-CM

## 2025-07-08 DIAGNOSIS — I25.10 CORONARY ARTERY DISEASE INVOLVING NATIVE CORONARY ARTERY OF NATIVE HEART, UNSPECIFIED WHETHER ANGINA PRESENT: ICD-10-CM

## 2025-07-08 DIAGNOSIS — E55.9 VITAMIN D DEFICIENCY: ICD-10-CM

## 2025-07-08 DIAGNOSIS — Z95.1 S/P CORONARY ARTERY BYPASS GRAFT X 3: ICD-10-CM

## 2025-07-08 DIAGNOSIS — I48.0 PAROXYSMAL ATRIAL FIBRILLATION (MULTI): ICD-10-CM

## 2025-07-08 DIAGNOSIS — Z00.00 MEDICARE ANNUAL WELLNESS VISIT, SUBSEQUENT: Primary | ICD-10-CM

## 2025-07-08 PROCEDURE — 99214 OFFICE O/P EST MOD 30 MIN: CPT | Performed by: FAMILY MEDICINE

## 2025-07-08 PROCEDURE — G0444 DEPRESSION SCREEN ANNUAL: HCPCS | Performed by: FAMILY MEDICINE

## 2025-07-08 PROCEDURE — 99497 ADVNCD CARE PLAN 30 MIN: CPT | Performed by: FAMILY MEDICINE

## 2025-07-08 PROCEDURE — 1159F MED LIST DOCD IN RCRD: CPT | Performed by: FAMILY MEDICINE

## 2025-07-08 PROCEDURE — 3078F DIAST BP <80 MM HG: CPT | Performed by: FAMILY MEDICINE

## 2025-07-08 PROCEDURE — 1036F TOBACCO NON-USER: CPT | Performed by: FAMILY MEDICINE

## 2025-07-08 PROCEDURE — 1158F ADVNC CARE PLAN TLK DOCD: CPT | Performed by: FAMILY MEDICINE

## 2025-07-08 PROCEDURE — 3074F SYST BP LT 130 MM HG: CPT | Performed by: FAMILY MEDICINE

## 2025-07-08 PROCEDURE — G0439 PPPS, SUBSEQ VISIT: HCPCS | Performed by: FAMILY MEDICINE

## 2025-07-08 PROCEDURE — G0446 INTENS BEHAVE THER CARDIO DX: HCPCS | Performed by: FAMILY MEDICINE

## 2025-07-08 RX ORDER — PANTOPRAZOLE SODIUM 40 MG/1
40 TABLET, DELAYED RELEASE ORAL
Qty: 90 TABLET | Refills: 3 | Status: SHIPPED | OUTPATIENT
Start: 2025-07-08

## 2025-07-08 RX ORDER — METOPROLOL TARTRATE 25 MG/1
25 TABLET, FILM COATED ORAL 2 TIMES DAILY
Qty: 180 TABLET | Refills: 3 | Status: SHIPPED | OUTPATIENT
Start: 2025-07-08 | End: 2026-07-08

## 2025-07-08 RX ORDER — BENAZEPRIL HYDROCHLORIDE AND HYDROCHLOROTHIAZIDE 20; 25 MG/1; MG/1
1 TABLET ORAL DAILY
Qty: 90 TABLET | Refills: 0 | Status: SHIPPED | OUTPATIENT
Start: 2025-07-08 | End: 2025-10-06

## 2025-07-08 RX ORDER — AMLODIPINE BESYLATE 5 MG/1
5 TABLET ORAL DAILY
Qty: 90 TABLET | Refills: 1 | Status: SHIPPED | OUTPATIENT
Start: 2025-07-08

## 2025-07-08 RX ORDER — LEVOTHYROXINE SODIUM 75 UG/1
75 TABLET ORAL DAILY
Qty: 90 TABLET | Refills: 1 | Status: SHIPPED | OUTPATIENT
Start: 2025-07-08 | End: 2026-01-04

## 2025-07-08 RX ORDER — ATORVASTATIN CALCIUM 40 MG/1
40 TABLET, FILM COATED ORAL DAILY
Qty: 90 TABLET | Refills: 3 | Status: SHIPPED | OUTPATIENT
Start: 2025-07-08 | End: 2026-07-08

## 2025-07-08 NOTE — PROGRESS NOTES
Advance Care Planning Note     Discussion Date: 07/08/25   Discussion Participants: patient    The patient wishes to discuss Advance Care Planning today and the following is a brief summary of our discussion.     Patient has capacity to make their own medical decisions: Yes  Health Care Agent/Surrogate Decision Maker documented in chart: Yes    Documents on file and valid:  Advance Directive/Living Will: Yes   Health Care Power of : Yes  Other: spouse    Communication of Medical Status/Prognosis:   tbs     Communication of Treatment Goals/Options:   tbs     Treatment Decisions  Goals of Care: survival is paramount regardless of prognosis, treatment outcome, or burden   tbs  Follow Up Plan  tbs  Team Members  tbs  Time Statement: Total face to face time spent on advance care planning was >15 minutes with 16 minutes spent in counseling, including the explanation.  Greater than 5 minutes PHQ-9.  Greater than 5 minutes cardiac reviewed as well as also prior CABG from last year from calcium score test.    Patient is doing excellent.  Patient is purposely lost 34 pounds using lose it to maintain and improve health.  Did have blood work done.  Refill thyroid medication acid reflux medication cholesterol medication blood pressure medication.    REVIEW OF SYSTEMS: 12 systems negative except for those mentioned in the HPI. Reviewed home risks with patient. Patient feels safe at home.    PHYSICAL EXAMINATION:   Constitutional: The patient is alert and oriented x3, in no acute  distress.  Eyes: Extraocular movements are intact. Pupils are equal and reactive to light  ENT: Bilateral TMs within normal limits. Nasal turbinates are within normal limits. Throat within normal limits.  Neck: Supple without lymphadenopathy or JVD.  Heart: Regular rate and rhythm without murmur, click, gallop, or rub.  Lungs: Clear to auscultation bilaterally. No rales, rhonchi, or  wheezing.  Abdomen: Soft, nontender, nondistended. Normoactive  bowel sounds.   No palpable masses. Normal percussion  Musculoskeletal: 5/5 motor, upper and lower extremities.  Neurologic: Cranial nerves II through XII fully intact. +2/4 DTRs  in ankle and knee.  Psychiatric: Good judgment and insight. Normal affect and mood. No cognitive defects noted.  Lymphatic: Negative as noted above.  Skin: no rashes or lesions    Assessment:  Per EMR    Plan:  Patient has done excellent overall weight is doing great blood pressure is great.  Otherwise patient will follow-up in 6 months.  Will wait on annual blood work  Discussed with the patient and reviewed annual wellness questionnaire form as well as cognitive deficits. Also discussed with the patient immunizations and risks for colonoscopy and other screening procedures    This dictation was created using Dragon dictation and may contain errors    Cedric Ashley, DO  7/8/2025 9:32 AM

## 2025-07-23 ENCOUNTER — APPOINTMENT (OUTPATIENT)
Dept: OPHTHALMOLOGY | Facility: CLINIC | Age: 80
End: 2025-07-23
Payer: MEDICARE

## 2025-07-23 DIAGNOSIS — H44.2A3 BOTH EYES AFFECTED BY DEGENERATIVE MYOPIA WITH CHOROIDAL NEOVASCULARIZATION: ICD-10-CM

## 2025-07-23 DIAGNOSIS — H35.3231 EXUDATIVE AGE-RELATED MACULAR DEGENERATION OF BOTH EYES WITH ACTIVE CHOROIDAL NEOVASCULARIZATION: Primary | ICD-10-CM

## 2025-07-23 DIAGNOSIS — H35.3221 EXUDATIVE AGE-RELATED MACULAR DEGENERATION OF LEFT EYE WITH ACTIVE CHOROIDAL NEOVASCULARIZATION: ICD-10-CM

## 2025-07-23 DIAGNOSIS — H35.3114 ADVANCED ATROPHIC NONEXUDATIVE AGE-RELATED MACULAR DEGENERATION OF RIGHT EYE WITH SUBFOVEAL INVOLVEMENT: ICD-10-CM

## 2025-07-23 PROCEDURE — 92134 CPTRZ OPH DX IMG PST SGM RTA: CPT | Performed by: OPHTHALMOLOGY

## 2025-07-23 PROCEDURE — 99213 OFFICE O/P EST LOW 20 MIN: CPT | Performed by: OPHTHALMOLOGY

## 2025-07-23 ASSESSMENT — CONF VISUAL FIELD
OD_SUPERIOR_TEMPORAL_RESTRICTION: 0
OD_INFERIOR_NASAL_RESTRICTION: 0
OS_INFERIOR_TEMPORAL_RESTRICTION: 0
OS_SUPERIOR_NASAL_RESTRICTION: 0
OS_INFERIOR_NASAL_RESTRICTION: 0
OD_NORMAL: 1
OS_NORMAL: 1
OD_SUPERIOR_NASAL_RESTRICTION: 0
METHOD: COUNTING FINGERS
OD_INFERIOR_TEMPORAL_RESTRICTION: 0
OS_SUPERIOR_TEMPORAL_RESTRICTION: 0

## 2025-07-23 ASSESSMENT — ENCOUNTER SYMPTOMS
ENDOCRINE NEGATIVE: 0
CONSTITUTIONAL NEGATIVE: 0
PSYCHIATRIC NEGATIVE: 0
RESPIRATORY NEGATIVE: 0
HEMATOLOGIC/LYMPHATIC NEGATIVE: 0
EYES NEGATIVE: 1
MUSCULOSKELETAL NEGATIVE: 0
GASTROINTESTINAL NEGATIVE: 0
CARDIOVASCULAR NEGATIVE: 0
ALLERGIC/IMMUNOLOGIC NEGATIVE: 0
NEUROLOGICAL NEGATIVE: 0

## 2025-07-23 ASSESSMENT — VISUAL ACUITY
OD_CC: 20/25
OD_CC+: -2
OS_CC+: -2
OS_CC: 20/50
CORRECTION_TYPE: GLASSES
METHOD: SNELLEN - LINEAR

## 2025-07-23 ASSESSMENT — TONOMETRY
OD_IOP_MMHG: 17
OS_IOP_MMHG: 17
IOP_METHOD: GOLDMANN APPLANATION

## 2025-07-24 ASSESSMENT — EXTERNAL EXAM - LEFT EYE: OS_EXAM: NORMAL

## 2025-07-24 ASSESSMENT — CUP TO DISC RATIO
OS_RATIO: 0.5
OD_RATIO: 0.5

## 2025-07-24 ASSESSMENT — SLIT LAMP EXAM - LIDS
COMMENTS: NORMAL
COMMENTS: NORMAL

## 2025-07-24 ASSESSMENT — EXTERNAL EXAM - RIGHT EYE: OD_EXAM: NORMAL

## 2025-07-24 NOTE — PROGRESS NOTES
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  -     OCT, Retina - OU - Both Eyes  Exudative age-related macular degeneration of left eye with active choroidal neovascularization  -     OCT, Retina - OU - Both Eyes  Advanced atrophic nonexudative age-related macular degeneration of right eye with subfoveal involvement  -     OCT, Retina - OU - Both Eyes      Last injection: Eylea 8mg OU ( 5/5/25)  DIAGNOSTIC PROCEDURE DONE  OCT DONE OD/OS  REASON FOR TEST: will help address and tailor  therapy by detecting subclinical CME SRF     Hi quality OCT  scans obtained  signal good    OCT OD - Normal Foveal Contour, subretinal fluid (SRF)  Edema almost resolved, IS/OS Junction Normal  OCT OS - Normal Foveal Contour, persistent subretinal fluid (SRF) No Edema, IS/OS Junction Normal    additional commnents: improvement no subretinal fluid (SRF) OU        Active exudative age-related macular degeneration (AMD)  OU    Myopic CNV both eyes, active fluid OU    Consider  both eyes (OU) Prn injections none today         FU 6w

## 2025-08-18 ENCOUNTER — APPOINTMENT (OUTPATIENT)
Dept: OPHTHALMOLOGY | Facility: CLINIC | Age: 80
End: 2025-08-18
Payer: MEDICARE

## 2025-08-18 DIAGNOSIS — H44.2A3 BOTH EYES AFFECTED BY DEGENERATIVE MYOPIA WITH CHOROIDAL NEOVASCULARIZATION: ICD-10-CM

## 2025-08-18 DIAGNOSIS — H35.3114 ADVANCED ATROPHIC NONEXUDATIVE AGE-RELATED MACULAR DEGENERATION OF RIGHT EYE WITH SUBFOVEAL INVOLVEMENT: ICD-10-CM

## 2025-08-18 DIAGNOSIS — H35.3210 EXUDATIVE AGE-RELATED MACULAR DEGENERATION OF RIGHT EYE, UNSPECIFIED STAGE: ICD-10-CM

## 2025-08-18 DIAGNOSIS — H44.23: ICD-10-CM

## 2025-08-18 DIAGNOSIS — H35.3231 EXUDATIVE AGE-RELATED MACULAR DEGENERATION OF BOTH EYES WITH ACTIVE CHOROIDAL NEOVASCULARIZATION: Primary | ICD-10-CM

## 2025-08-18 DIAGNOSIS — H44.23 DEGENERATIVE MYOPIA OF BOTH EYES, UNSPECIFIED WHETHER COMPLICATION PRESENT: ICD-10-CM

## 2025-08-18 DIAGNOSIS — H35.053: ICD-10-CM

## 2025-08-18 DIAGNOSIS — H35.3221 EXUDATIVE AGE-RELATED MACULAR DEGENERATION OF LEFT EYE WITH ACTIVE CHOROIDAL NEOVASCULARIZATION: ICD-10-CM

## 2025-08-18 PROCEDURE — 92134 CPTRZ OPH DX IMG PST SGM RTA: CPT | Performed by: OPHTHALMOLOGY

## 2025-08-18 PROCEDURE — 67028 INJECTION EYE DRUG: CPT | Mod: BILATERAL PROCEDURE | Performed by: OPHTHALMOLOGY

## 2025-08-18 ASSESSMENT — TONOMETRY
IOP_METHOD: GOLDMANN APPLANATION
OD_IOP_MMHG: 17
OS_IOP_MMHG: 17

## 2025-08-18 ASSESSMENT — CONF VISUAL FIELD
OS_INFERIOR_NASAL_RESTRICTION: 0
OD_NORMAL: 1
OD_SUPERIOR_TEMPORAL_RESTRICTION: 0
OS_SUPERIOR_TEMPORAL_RESTRICTION: 0
OD_SUPERIOR_NASAL_RESTRICTION: 0
OD_INFERIOR_TEMPORAL_RESTRICTION: 0
OS_NORMAL: 1
OS_INFERIOR_TEMPORAL_RESTRICTION: 0
OS_SUPERIOR_NASAL_RESTRICTION: 0
OD_INFERIOR_NASAL_RESTRICTION: 0

## 2025-08-18 ASSESSMENT — EXTERNAL EXAM - LEFT EYE: OS_EXAM: NORMAL

## 2025-08-18 ASSESSMENT — EXTERNAL EXAM - RIGHT EYE: OD_EXAM: NORMAL

## 2025-08-18 ASSESSMENT — ENCOUNTER SYMPTOMS
ALLERGIC/IMMUNOLOGIC NEGATIVE: 0
RESPIRATORY NEGATIVE: 0
HEMATOLOGIC/LYMPHATIC NEGATIVE: 0
CONSTITUTIONAL NEGATIVE: 0
CARDIOVASCULAR NEGATIVE: 0
EYES NEGATIVE: 1
ENDOCRINE NEGATIVE: 0
PSYCHIATRIC NEGATIVE: 0
GASTROINTESTINAL NEGATIVE: 0
MUSCULOSKELETAL NEGATIVE: 0
NEUROLOGICAL NEGATIVE: 0

## 2025-08-18 ASSESSMENT — CUP TO DISC RATIO
OD_RATIO: 0.5
OS_RATIO: 0.5

## 2025-08-18 ASSESSMENT — VISUAL ACUITY
OS_CC: 20/60-2
METHOD: SNELLEN - LINEAR
OD_CC: 20/20-2

## 2025-08-18 ASSESSMENT — SLIT LAMP EXAM - LIDS
COMMENTS: NORMAL
COMMENTS: NORMAL

## 2025-09-15 ENCOUNTER — APPOINTMENT (OUTPATIENT)
Dept: OPHTHALMOLOGY | Facility: CLINIC | Age: 80
End: 2025-09-15
Payer: MEDICARE

## 2025-11-26 ENCOUNTER — APPOINTMENT (OUTPATIENT)
Dept: CARDIOLOGY | Facility: CLINIC | Age: 80
End: 2025-11-26
Payer: MEDICARE

## 2026-01-08 ENCOUNTER — APPOINTMENT (OUTPATIENT)
Dept: PRIMARY CARE | Facility: CLINIC | Age: 81
End: 2026-01-08
Payer: MEDICARE

## (undated) DEVICE — SUTURE, PROLENE, 5-0, 24 IN, C1, DA, BLUE

## (undated) DEVICE — SPONGE, HEMOSTATIC, GELATIN, SURGIFOAM, 8 X 12.5 CM X 10 MM

## (undated) DEVICE — BONE WAX, 2.5G ABSORBABLE, OSTENE

## (undated) DEVICE — BLOWER MISTER KIT, CLEARVIEW, MALLEABLE SHAFT, W/TUBING, 16.5 CM

## (undated) DEVICE — CATHETER, INFINITI DIAGNOSTIC, 5 FR 100CM 3DRC, WILLIAMS RIGHT OR NO TORQUE

## (undated) DEVICE — SUTURE, PROLENE 8-0, TAPER POINT, BV175-6 BLUE 24 INCH

## (undated) DEVICE — RETRACTOR, SUTURE, HOLDING, INSERT, OCTOBASE, DISPOSABLE

## (undated) DEVICE — Device

## (undated) DEVICE — TUBING, INSUFFLATION, LAPAROSCOPIC, FILTER, 10 FT

## (undated) DEVICE — CATHETER, DIAGNOSTIC, 5FR,  PIG-145, 110CM, 6SH ANGLED

## (undated) DEVICE — COVER, BACK TABLE

## (undated) DEVICE — CLIP, LIGATING, HORIZON, WIDE SLOT, SMALL, TITANIUM

## (undated) DEVICE — CATHETER, DIAGNOSTIC, JUDKINS, LEFT, 5 FR-JL 4.0

## (undated) DEVICE — CLIP, LIGATING, HORIZON, MEDIUM, TITANIUM

## (undated) DEVICE — TAPE, PAPER, SURGICAL, MICROPORE, 3 IN X 10 YD, LF

## (undated) DEVICE — SOLUTION, INJECTION, USP, PLASMALYTE A, PH 7.4, TYPE 1, 1000 ML, BAG

## (undated) DEVICE — SUTURE, PROLENE, 6-0, 30 IN, C-1, CV-11, BLUE

## (undated) DEVICE — PROTECTOR, NERVE, ULNAR, PINK

## (undated) DEVICE — LEAD, PACING, MYOCARDIAL, BIPOLAR, TEMPORARY

## (undated) DEVICE — CLIP, SPRING, BULLDOG, FOGARTY, SOFT JAW, 6 MM, LF

## (undated) DEVICE — CANNULA, RETROGRADE, SELF INFLAT, 14FR X 18MM

## (undated) DEVICE — SHEATH, PINNACLE, W/.038 GW 10CM, 5FR INTRODUCER, 2.5 CM DIALATOR

## (undated) DEVICE — TUBING PACK, OXYGENATOR, ADULT

## (undated) DEVICE — CASSETTE, BLOOD, PLEGIC SET

## (undated) DEVICE — SUTURE, PROLENE 4-0, TAPER POINT, SH-1 BLUE 30 INCH

## (undated) DEVICE — ELECTRODE, MULTIFUNCTION, QUICK-COMBO, EDGE SYSTEM, 2 FT

## (undated) DEVICE — GOWN, SURGICAL, ROYAL SILK, XL, STERILE

## (undated) DEVICE — MANIFOLD, 4 PORT NEPTUNE STANDARD

## (undated) DEVICE — SYRINGE, 10 CC, LUER LOCK

## (undated) DEVICE — PACING CABLE, EXTENSION, 12 FT BEIGE, DISPOSABLE

## (undated) DEVICE — CATHETER, IV, ANGIOCATH, 16 G X 1.88 IN, FEP POLYMER

## (undated) DEVICE — STRAP, VELCRO, BODY, 4 X 60IN, NS

## (undated) DEVICE — PRE-CLEAN KIT, BEDSIDE, FIRST STEP

## (undated) DEVICE — NEEDLE, SAFETY, 18 G X 1.5 IN

## (undated) DEVICE — CLOSURE SYSTEM, VASCULAR, VASCADE, 5 F

## (undated) DEVICE — KNIFE, MICRO, 15 DEG BLADE AND TIP, DISP

## (undated) DEVICE — INSERT, CLAMP, FOGARTY, SOFTJAW, 86MM, LF

## (undated) DEVICE — SUTURE, STEEL, 7, 18 IN, CCS, SILVER

## (undated) DEVICE — DRAPE, SHEET, CARDIOVASCULAR, ANTIMICROBIAL, W/ANESTHESIA SCREEN, IOBAN 2, STERI DRAPE, 107 X 133 IN, DISPOSABLE, FABRIC, BLUE, STERILE

## (undated) DEVICE — BLANKET, WATER, MUL-T-PAD, 15 X 22 IN, DISPOSABLE

## (undated) DEVICE — HANDLE, LITE EZ, PLASTIC, DISP, LF

## (undated) DEVICE — CANNULA, VENOUS 2 STAGE 32/40

## (undated) DEVICE — APPLICATOR, CHLORAPREP, W/ORANGE TINT, 26ML

## (undated) DEVICE — DRAPE, FLUID WARMING

## (undated) DEVICE — DRAPE, SHEET, XL

## (undated) DEVICE — RESERVOIRE, ATR 120 COLLECTION

## (undated) DEVICE — PUNCH, AORTIC 4MM, BULLET TIP

## (undated) DEVICE — MICROCOAGULATION TEST, ACT+ TEST CUVETTE

## (undated) DEVICE — NEEDLE, ANGIOGRAPHY, W/SELDINGER SHEILD HUB, 18G

## (undated) DEVICE — TUBING, SUCTION, CONNECTING, NON-CONDUCTIVE, SURE GRIP CONNECTORS, 3/16 X 18 IN, PVC

## (undated) DEVICE — LABEL KIT, MEDICATION, OR EMC, STERILE

## (undated) DEVICE — FILTER, IV, BLOOD, MICROAGGREGATE, 40 MIC, RBC TRANSFUSION

## (undated) DEVICE — SYRINGE, ANGIOGRAPHIC, MULTIDOSE

## (undated) DEVICE — TOWEL PACK 10-PK

## (undated) DEVICE — SUTURE, ETHIBOND, XTRA, 30 IN, 0, CT-1, GREEN

## (undated) DEVICE — ATS SUCTION LINE

## (undated) DEVICE — PACING CABLE, EXTENSION, 12 FT BLUE, DISPOSABLE

## (undated) DEVICE — COVER, EQUIPMENT, SOLUTION, SLUSH, 112 X 168 CM, LF, STERILE

## (undated) DEVICE — SPONGE, LAP, XRAY DECT, 18IN X 18IN, W/MASTER DMT, STERILE

## (undated) DEVICE — GOWN, SURGICAL, ROYAL SILK, LG, STERILE

## (undated) DEVICE — STATLOCK, FOLEY SECURE, 3-WAY

## (undated) DEVICE — DEVICE, ENDOSCOPIC VESSEL HARVESTING, SAPHENA VENAPAX

## (undated) DEVICE — SOLUTION, INJECTION, USP, SODIUM CHLORIDE 0.9%, .9, NACL, 1000 ML, BAG

## (undated) DEVICE — ELECTRODE, ELECTROSURGICAL, BLADE, 4IN, UN-INSULATED

## (undated) DEVICE — SYRINGE, 20 CC, LUER LOCK

## (undated) DEVICE — SOLUTION, SODIUM CHLORIDE 0.9%, 3000ML, BAG

## (undated) DEVICE — BLADE, STERNUM, 32MM CUT EDGE

## (undated) DEVICE — SUTURE, VICRYL, 4-0, 18 IN, UNDYED BR PS-2

## (undated) DEVICE — CONNECTOR, STRAIGHT, 0.5 X 0.5 IN

## (undated) DEVICE — GEL, ECOVUE, ULTRASOUND, 20GRAM, STERILE

## (undated) DEVICE — TIP, SUCTION, YANKAUER, FLEXIBLE

## (undated) DEVICE — SHUNT, SENSOR

## (undated) DEVICE — TUBING, MANIFOLD, LOW PRESSURE

## (undated) DEVICE — SUTURE, VICRYL, 3-0, 27 IN, CT-1, UNDYED

## (undated) DEVICE — HANDLE, PH, FOR YANKAUER SUCTION DEVICE

## (undated) DEVICE — CANNULA, EOPA 20F W/O GUIDEWIRE

## (undated) DEVICE — BANDAGE, QUIKCLOT, INTERVENTIONAL HEMO, W/O SLIT

## (undated) DEVICE — TOWEL, OR, 17 X 27, 4 PK, WHITE, STERILE

## (undated) DEVICE — CLIP, LIGATING, HORIZON, LARGE, TITANIUM

## (undated) DEVICE — SET, AUTOTRANSFUSION, AT1

## (undated) DEVICE — OXYGENATOR FX 25, W/HR, ARTERIAL FILTER

## (undated) DEVICE — BAG, DECANTER